# Patient Record
Sex: MALE | Race: WHITE | NOT HISPANIC OR LATINO | URBAN - METROPOLITAN AREA
[De-identification: names, ages, dates, MRNs, and addresses within clinical notes are randomized per-mention and may not be internally consistent; named-entity substitution may affect disease eponyms.]

---

## 2018-08-04 ENCOUNTER — INPATIENT (INPATIENT)
Facility: HOSPITAL | Age: 73
LOS: 6 days | Discharge: HOME | DRG: 435 | End: 2018-08-11
Attending: INTERNAL MEDICINE | Admitting: INTERNAL MEDICINE
Payer: MEDICARE

## 2018-08-04 VITALS
RESPIRATION RATE: 20 BRPM | TEMPERATURE: 98 F | DIASTOLIC BLOOD PRESSURE: 70 MMHG | HEART RATE: 100 BPM | OXYGEN SATURATION: 98 % | SYSTOLIC BLOOD PRESSURE: 109 MMHG | WEIGHT: 184.97 LBS | HEIGHT: 69 IN

## 2018-08-04 DIAGNOSIS — C25.9 MALIGNANT NEOPLASM OF PANCREAS, UNSPECIFIED: ICD-10-CM

## 2018-08-04 PROBLEM — Z00.00 ENCOUNTER FOR PREVENTIVE HEALTH EXAMINATION: Status: ACTIVE | Noted: 2018-08-04

## 2018-08-04 LAB
ALBUMIN SERPL ELPH-MCNC: 3.2 G/DL — LOW (ref 3.3–5)
ALP SERPL-CCNC: 440 U/L — HIGH (ref 40–120)
ALT FLD-CCNC: 90 U/L — HIGH (ref 10–45)
ANION GAP SERPL CALC-SCNC: 15 MMOL/L — SIGNIFICANT CHANGE UP (ref 5–17)
APTT BLD: 28.7 SEC — SIGNIFICANT CHANGE UP (ref 27.5–37.4)
AST SERPL-CCNC: 67 U/L — HIGH (ref 10–40)
BASOPHILS # BLD AUTO: 0 K/UL — SIGNIFICANT CHANGE UP (ref 0–0.2)
BASOPHILS NFR BLD AUTO: 0.2 % — SIGNIFICANT CHANGE UP (ref 0–2)
BILIRUB DIRECT SERPL-MCNC: >10 MG/DL — HIGH (ref 0–0.2)
BILIRUB INDIRECT FLD-MCNC: <10.5 MG/DL — HIGH (ref 0.2–1)
BILIRUB SERPL-MCNC: 20.5 MG/DL — HIGH (ref 0.2–1.2)
BILIRUB SERPL-MCNC: 20.5 MG/DL — HIGH (ref 0.2–1.2)
BUN SERPL-MCNC: 18 MG/DL — SIGNIFICANT CHANGE UP (ref 7–23)
CALCIUM SERPL-MCNC: 9.1 MG/DL — SIGNIFICANT CHANGE UP (ref 8.4–10.5)
CHLORIDE SERPL-SCNC: 102 MMOL/L — SIGNIFICANT CHANGE UP (ref 96–108)
CO2 SERPL-SCNC: 22 MMOL/L — SIGNIFICANT CHANGE UP (ref 22–31)
CREAT SERPL-MCNC: 1.06 MG/DL — SIGNIFICANT CHANGE UP (ref 0.5–1.3)
EOSINOPHIL # BLD AUTO: 0 K/UL — SIGNIFICANT CHANGE UP (ref 0–0.5)
EOSINOPHIL NFR BLD AUTO: 0.4 % — SIGNIFICANT CHANGE UP (ref 0–6)
GAS PNL BLDV: SIGNIFICANT CHANGE UP
GLUCOSE SERPL-MCNC: 135 MG/DL — HIGH (ref 70–99)
HCT VFR BLD CALC: 38.3 % — LOW (ref 39–50)
HGB BLD-MCNC: 13.2 G/DL — SIGNIFICANT CHANGE UP (ref 13–17)
INR BLD: 1.18 RATIO — HIGH (ref 0.88–1.16)
LIDOCAIN IGE QN: 16 U/L — SIGNIFICANT CHANGE UP (ref 7–60)
LYMPHOCYTES # BLD AUTO: 1.1 K/UL — SIGNIFICANT CHANGE UP (ref 1–3.3)
LYMPHOCYTES # BLD AUTO: 11.1 % — LOW (ref 13–44)
MCHC RBC-ENTMCNC: 33.7 PG — SIGNIFICANT CHANGE UP (ref 27–34)
MCHC RBC-ENTMCNC: 34.5 GM/DL — SIGNIFICANT CHANGE UP (ref 32–36)
MCV RBC AUTO: 97.7 FL — SIGNIFICANT CHANGE UP (ref 80–100)
MONOCYTES # BLD AUTO: 1 K/UL — HIGH (ref 0–0.9)
MONOCYTES NFR BLD AUTO: 11 % — SIGNIFICANT CHANGE UP (ref 2–14)
NEUTROPHILS # BLD AUTO: 7.4 K/UL — SIGNIFICANT CHANGE UP (ref 1.8–7.4)
NEUTROPHILS NFR BLD AUTO: 77.2 % — HIGH (ref 43–77)
PLATELET # BLD AUTO: 297 K/UL — SIGNIFICANT CHANGE UP (ref 150–400)
POTASSIUM SERPL-MCNC: 4 MMOL/L — SIGNIFICANT CHANGE UP (ref 3.5–5.3)
POTASSIUM SERPL-SCNC: 4 MMOL/L — SIGNIFICANT CHANGE UP (ref 3.5–5.3)
PROT SERPL-MCNC: 6.2 G/DL — SIGNIFICANT CHANGE UP (ref 6–8.3)
PROTHROM AB SERPL-ACNC: 12.9 SEC — HIGH (ref 9.8–12.7)
RBC # BLD: 3.92 M/UL — LOW (ref 4.2–5.8)
RBC # FLD: 13.4 % — SIGNIFICANT CHANGE UP (ref 10.3–14.5)
SODIUM SERPL-SCNC: 139 MMOL/L — SIGNIFICANT CHANGE UP (ref 135–145)
WBC # BLD: 9.6 K/UL — SIGNIFICANT CHANGE UP (ref 3.8–10.5)
WBC # FLD AUTO: 9.6 K/UL — SIGNIFICANT CHANGE UP (ref 3.8–10.5)

## 2018-08-04 PROCEDURE — 71045 X-RAY EXAM CHEST 1 VIEW: CPT | Mod: 26

## 2018-08-04 PROCEDURE — 93010 ELECTROCARDIOGRAM REPORT: CPT

## 2018-08-04 PROCEDURE — 99223 1ST HOSP IP/OBS HIGH 75: CPT

## 2018-08-04 PROCEDURE — 99285 EMERGENCY DEPT VISIT HI MDM: CPT | Mod: 25

## 2018-08-04 RX ORDER — FAMOTIDINE 10 MG/ML
20 INJECTION INTRAVENOUS ONCE
Qty: 0 | Refills: 0 | Status: COMPLETED | OUTPATIENT
Start: 2018-08-04 | End: 2018-08-04

## 2018-08-04 RX ORDER — SODIUM CHLORIDE 9 MG/ML
1000 INJECTION INTRAMUSCULAR; INTRAVENOUS; SUBCUTANEOUS ONCE
Qty: 0 | Refills: 0 | Status: COMPLETED | OUTPATIENT
Start: 2018-08-04 | End: 2018-08-04

## 2018-08-04 RX ADMIN — SODIUM CHLORIDE 1000 MILLILITER(S): 9 INJECTION INTRAMUSCULAR; INTRAVENOUS; SUBCUTANEOUS at 21:30

## 2018-08-04 RX ADMIN — FAMOTIDINE 20 MILLIGRAM(S): 10 INJECTION INTRAVENOUS at 21:35

## 2018-08-04 RX ADMIN — Medication 30 MILLILITER(S): at 21:35

## 2018-08-04 NOTE — H&P ADULT - ASSESSMENT
74 y/o male PMHx Afib on xarelto, HTN, former cigar smoker (was not a cigarette smoker except [place] [employer]) now presenting with jaundice and found to have pancreatic mass concerning for pancreatic cancer with metastases on imaging at outside hospital

## 2018-08-04 NOTE — ED PROVIDER NOTE - PROGRESS NOTE DETAILS
BAM Jones: Discussed case with on call physician Dr. Dwayne Dolan for Dr. Juan Carlos Asif who recommended pt to be admitted on Dr. Frederick. Discussed case with Dr. Frederick and accepted patient. Recommended to call [facility] [phone]. Will page now BAM Jones: Paged [facility] [phone] and awaiting for Dr. Se Parr to page

## 2018-08-04 NOTE — ED ADULT NURSE NOTE - OBJECTIVE STATEMENT
74 YO male with PMH atrial fibrillation on xarelto and carvedilol, via walk in presenting with complaints of jaundice and heartburn. As per patient, [place], pt began to experience diarrhea on and off a few weeks ago, wife then noticed patient skin became more yellow. While on vacation, pt had ED visit and patient provided IV hydration and examinations obtained. As per patient, abnormal CT scan resulted potential growth on pancreas. PT and wife travelled home and now presenting to be evaluated at [facility] ED. Pt reports chills after eating. Pt reports lightheadedness on exertion. PT denies diarrhea for the last day. Pt admitting to jaundice coloring improving, but patient now experiencing heartburn and decreased PO intake. Pt denies chest pain, shortness of breath, visual disturbances, numbness/tingling, fever, chills, diaphoresis, headache, nausea, vomiting, constipation, diarrhea, or urinary symptoms.   Pt Axox4, gross neuro intact, PERRL 3 mm, yellow sclera. Lungs clear throughout bilateral. S1S2 heard. Abdomen soft, non-tender, non-distended. Skin warm, dry, and intact, generalized yellow noted. Safety and comfort measures maintained. family present at bedside.

## 2018-08-04 NOTE — H&P ADULT - NSHPPHYSICALEXAM_GEN_ALL_CORE
Vital Signs Last 24 Hrs  T(C): 37.1 (05 Aug 2018 00:28), Max: 37.1 (05 Aug 2018 00:28)  T(F): 98.8 (05 Aug 2018 00:28), Max: 98.8 (05 Aug 2018 00:28)  HR: 100 (05 Aug 2018 00:28) (94 - 100)  BP: 114/75 (05 Aug 2018 00:28) (109/70 - 114/75)  BP(mean): --  RR: 18 (05 Aug 2018 00:28) (18 - 20)  SpO2: 95% (05 Aug 2018 00:28) (95% - 98%)    GENERAL: No acute distress, well-developed  HEAD:  Atraumatic, Normocephalic  EYES: EOMI, +Scleral icterus.   ENT: Oral mucosa moist  NECK: Neck supple  CHEST/LUNG: Clear to auscultation bilaterally; No wheeze, no rales, no rhonchi.    HEART: Regular rate and rhythm; No murmurs, rubs, or gallops  ABDOMEN: Soft, Nontender, Nondistended; Bowel sounds present  EXTREMITIES:  No clubbing, cyanosis, or edema  VASCULAR: Posterior tibialis pulses intact bilaterally  PSYCH: Normal behavior, normal affect  NEUROLOGY: AAOx3  SKIN: grossly warm and dry, +mild jaundice

## 2018-08-04 NOTE — ED ADULT NURSE NOTE - NSIMPLEMENTINTERV_GEN_ALL_ED
Implemented All Fall with Harm Risk Interventions:  [place] to call system. Call bell, personal items and telephone within reach. Instruct patient to call for assistance. Room bathroom lighting operational. Non-slip footwear when patient is off stretcher. Physically safe environment: no spills, clutter or unnecessary equipment. Stretcher in lowest position, wheels locked, appropriate side rails in place. Provide visual cue, wrist band, yellow gown, etc. Monitor gait and stability. Monitor for mental status changes and reorient to person, place, and time. Review medications for side effects contributing to fall risk. Reinforce activity limits and safety measures with patient and family. Provide visual clues: red socks.

## 2018-08-04 NOTE — H&P ADULT - HISTORY OF PRESENT ILLNESS
74 y/o male PMHx Afib on xarelto, HTN, former cigar smoker (was not a cigarette smoker [place] now presenting with jaundice and found to have pancreatic mass concerning for pancreatic cancer with metastases on imaging at outside hospital. Patient and family report that patient had been feeling mildly unwell over the last 2-3 weeks, with feelings of lethargy and generalized achiness and weakness. Patient had also noticed weight loss of 20 pounds over the last few months though he had been attempting to lose weight on purpose so initially he thought it was related to his weight loss attempts. However, he has also had poor appetite for a few months. Over the last 2-3 weeks he began to feel more tired/lethargic and just felt achy. He decided to take a vacation to [facility] with his wife to recuperate, however after going [facility] he began to feel worse over the last 2-3 days with worsening lethargy and weakness. He was then noted to have yellowish eyes and skin by his wife over the last 1-2 days and he was noted to have darker colored urine. He was urged today by his friends to go to a hospital in [facility] to be seen immediately and there had CT scan performed showing distended biliary ducts and gallbladder as well as pancreatic mass and likely omental metastatic disease. He has now come to [facility] for further care. Patient denies any fevers or chills though he has had poor cold intolerance ever since starting beta blockade that is unchanged. Patient denies nausea or vomiting. Does report itching of skin x 1-2 days but reports that this is mild and tolerable at this time. No dysuria. +light colored loose stools x 1 day. No leg swelling or pain.

## 2018-08-04 NOTE — H&P ADULT - NSHPLABSRESULTS_GEN_ALL_CORE
Labs personally reviewed:                        13.2   9.6   )-----------( 297      ( 04 Aug 2018 21:57 )             38.3       08-04    139  |  102  |  18  ----------------------------<  135<H>  4.0   |  22  |  1.06    Ca    9.1      04 Aug 2018 21:57    TPro  6.2  /  Alb  3.2<L>  /  TBili  20.5<H>  /  DBili  >10.0<H>  /  AST  67<H>  /  ALT  90<H>  /  AlkPhos  440<H>  08-04            LIVER FUNCTIONS - ( 04 Aug 2018 21:57 )  Alb: 3.2 g/dL / Pro: 6.2 g/dL / ALK PHOS: 440 U/L / ALT: 90 U/L / AST: 67 U/L / GGT: x             PT/INR - ( 04 Aug 2018 21:57 )   PT: 12.9 sec;   INR: 1.18 ratio         PTT - ( 04 Aug 2018 21:57 )  PTT:28.7 sec    Urinalysis Basic - ( 04 Aug 2018 23:44 )    Color: Brown / Appearance: SL Turbid / SG: >1.030 / pH: x  Gluc: x / Ketone: Negative  / Bili: Large / Urobili: Negative   Blood: x / Protein: Trace / Nitrite: Negative   Leuk Esterase: Negative / RBC: 0-2 /HPF / WBC 6-10 /HPF   Sq Epi: x / Non Sq Epi: OCC /HPF / Bacteria: Few /HPF    CXR personally reviewed  CT from OSH noted to be uploaded to system, distended gallbladder and biliary ducts notable. Outside report w/findings concerning for pancreatic ca with metastatic dz  EKG personally reviewed-Afib w/rvr to 109 bpm

## 2018-08-04 NOTE — H&P ADULT - PROBLEM SELECTOR PLAN 1
Afib w/RVR  Missed evening dose of carvedilol  Stat carvedilol  IV fluid support while NPO  Holding xarelto for possible procedure/biopsy with GI  Should be resumed on AC s/p procedure(s) when safe

## 2018-08-04 NOTE — ED PROVIDER NOTE - ATTENDING CONTRIBUTION TO CARE
72 y/o male PMHx Afib on xarelto, HTN former smoker presented to ED for painless jaundice and diarrhea; diagnosed earlier today with potential pancreatic mass and elevated bilirubin on CT and labs at outside hospital; now arriving here for further evaluation.  On exam, patient is visibly jaundice, afebrile, and in NAD.  Cardiac nrl s1/s2 RRR no MGR.  Lungs CTABL.  Abdomen soft nt/nd; no appreciable hepatosplenomegaly.  No peripheral edema.  Skin: jaundiced.  Eyes: PERRL, + scleral icterus.  ENT: oropharynx jaundiced, MMM.      Concern for malignancy and obstruction of biliary system; will upload CD of CT from earlier today (CT c/a/p) and check labs, including bc, cmp, lipase; will need admission for further evaluation / management.

## 2018-08-04 NOTE — ED PROVIDER NOTE - OBJECTIVE STATEMENT
74 y/o male PMHx Afib on xarelto, HTN former smoker presented to ED for painless jaundice and diarrhea. Patient stated symptoms started while vacationing [facility] few days ago. Patient stated he had diarrhea every 1-2 hours which were lobo colored. He then started to have dark urine, icterus, jaundice, and pruritis. Patient also c/o GERD like symptoms and epigastric TTP. Patient was seen at [facility] for further w/u which patient was found to have suspicious signs for metastatic pancreatic cancer with mass in pancreatic body, bile duct dilation, GB distension, small ascites and omental caking on CT abdomen and pelvis. Denied SOB, CP, N/V, headache, dizziness, weakness, cough, fever chills 72 y/o male PMHx Afib on xarelto, HTN former smoker presented to ED for painless jaundice and diarrhea. Patient stated symptoms started while vacationing [facility] few days ago. Patient stated he had diarrhea every 1-2 hours which were lobo colored. He then started to have dark urine, icterus, jaundice, and pruritis. Patient also c/o GERD like symptoms and epigastric TTP. Patient was seen at [facility] for further w/u which patient was found to have suspicious signs for metastatic pancreatic cancer with mass in pancreatic body, bile duct dilation, GB distension, small ascites and omental caking on CT abdomen and pelvis. Denied SOB, CP, N/V, headache, dizziness, weakness, cough, fever chills    Cardiology: Dr. Juan Carlos Torres   PMD: Dr. Sathish Steen

## 2018-08-04 NOTE — H&P ADULT - NSHPSOCIALHISTORY_GEN_ALL_CORE
Former cigar smoker but only occasional (once every few months)  [place] cigarettes [place] in [place] but did not smoke regularly  Rare social etoh

## 2018-08-05 DIAGNOSIS — I48.91 UNSPECIFIED ATRIAL FIBRILLATION: ICD-10-CM

## 2018-08-05 DIAGNOSIS — I10 ESSENTIAL (PRIMARY) HYPERTENSION: ICD-10-CM

## 2018-08-05 DIAGNOSIS — K83.1 OBSTRUCTION OF BILE DUCT: ICD-10-CM

## 2018-08-05 DIAGNOSIS — C25.1 MALIGNANT NEOPLASM OF BODY OF PANCREAS: ICD-10-CM

## 2018-08-05 DIAGNOSIS — Z98.890 OTHER SPECIFIED POSTPROCEDURAL STATES: Chronic | ICD-10-CM

## 2018-08-05 LAB
ALBUMIN SERPL ELPH-MCNC: 3.1 G/DL — LOW (ref 3.3–5)
ALP SERPL-CCNC: 385 U/L — HIGH (ref 40–120)
ALT FLD-CCNC: 75 U/L — HIGH (ref 10–45)
ANION GAP SERPL CALC-SCNC: 12 MMOL/L — SIGNIFICANT CHANGE UP (ref 5–17)
APPEARANCE UR: ABNORMAL
APTT BLD: 177.8 SEC — CRITICAL HIGH (ref 27.5–37.4)
AST SERPL-CCNC: 64 U/L — HIGH (ref 10–40)
BACTERIA # UR AUTO: ABNORMAL /HPF
BASOPHILS # BLD AUTO: 0.01 K/UL — SIGNIFICANT CHANGE UP (ref 0–0.2)
BASOPHILS NFR BLD AUTO: 0.1 % — SIGNIFICANT CHANGE UP (ref 0–2)
BILIRUB SERPL-MCNC: 18.5 MG/DL — HIGH (ref 0.2–1.2)
BILIRUB UR-MCNC: ABNORMAL
BUN SERPL-MCNC: 17 MG/DL — SIGNIFICANT CHANGE UP (ref 7–23)
CALCIUM SERPL-MCNC: 8.5 MG/DL — SIGNIFICANT CHANGE UP (ref 8.4–10.5)
CHLORIDE SERPL-SCNC: 104 MMOL/L — SIGNIFICANT CHANGE UP (ref 96–108)
CO2 SERPL-SCNC: 18 MMOL/L — LOW (ref 22–31)
COLOR SPEC: ABNORMAL
COMMENT - URINE: SIGNIFICANT CHANGE UP
CREAT SERPL-MCNC: 0.84 MG/DL — SIGNIFICANT CHANGE UP (ref 0.5–1.3)
DIFF PNL FLD: NEGATIVE — SIGNIFICANT CHANGE UP
EOSINOPHIL # BLD AUTO: 0.06 K/UL — SIGNIFICANT CHANGE UP (ref 0–0.5)
EOSINOPHIL NFR BLD AUTO: 0.6 % — SIGNIFICANT CHANGE UP (ref 0–6)
EPI CELLS # UR: SIGNIFICANT CHANGE UP /HPF
GLUCOSE SERPL-MCNC: 116 MG/DL — HIGH (ref 70–99)
GLUCOSE UR QL: NEGATIVE — SIGNIFICANT CHANGE UP
HCT VFR BLD CALC: 34.5 % — LOW (ref 39–50)
HCT VFR BLD CALC: 35.8 % — LOW (ref 39–50)
HGB BLD-MCNC: 12 G/DL — LOW (ref 13–17)
HGB BLD-MCNC: 12.1 G/DL — LOW (ref 13–17)
IMM GRANULOCYTES NFR BLD AUTO: 0.4 % — SIGNIFICANT CHANGE UP (ref 0–1.5)
KETONES UR-MCNC: NEGATIVE — SIGNIFICANT CHANGE UP
LEUKOCYTE ESTERASE UR-ACNC: NEGATIVE — SIGNIFICANT CHANGE UP
LYMPHOCYTES # BLD AUTO: 1.29 K/UL — SIGNIFICANT CHANGE UP (ref 1–3.3)
LYMPHOCYTES # BLD AUTO: 13 % — SIGNIFICANT CHANGE UP (ref 13–44)
MAGNESIUM SERPL-MCNC: 1.8 MG/DL — SIGNIFICANT CHANGE UP (ref 1.6–2.6)
MCHC RBC-ENTMCNC: 31 PG — SIGNIFICANT CHANGE UP (ref 27–34)
MCHC RBC-ENTMCNC: 33.1 PG — SIGNIFICANT CHANGE UP (ref 27–34)
MCHC RBC-ENTMCNC: 33.7 GM/DL — SIGNIFICANT CHANGE UP (ref 32–36)
MCHC RBC-ENTMCNC: 34.8 GM/DL — SIGNIFICANT CHANGE UP (ref 32–36)
MCV RBC AUTO: 89.1 FL — SIGNIFICANT CHANGE UP (ref 80–100)
MCV RBC AUTO: 98.2 FL — SIGNIFICANT CHANGE UP (ref 80–100)
MONOCYTES # BLD AUTO: 1.13 K/UL — HIGH (ref 0–0.9)
MONOCYTES NFR BLD AUTO: 11.4 % — SIGNIFICANT CHANGE UP (ref 2–14)
NEUTROPHILS # BLD AUTO: 7.42 K/UL — HIGH (ref 1.8–7.4)
NEUTROPHILS NFR BLD AUTO: 74.5 % — SIGNIFICANT CHANGE UP (ref 43–77)
NITRITE UR-MCNC: NEGATIVE — SIGNIFICANT CHANGE UP
PH UR: 6 — SIGNIFICANT CHANGE UP (ref 5–8)
PHOSPHATE SERPL-MCNC: 3.1 MG/DL — SIGNIFICANT CHANGE UP (ref 2.5–4.5)
PLATELET # BLD AUTO: 275 K/UL — SIGNIFICANT CHANGE UP (ref 150–400)
PLATELET # BLD AUTO: 308 K/UL — SIGNIFICANT CHANGE UP (ref 150–400)
POTASSIUM SERPL-MCNC: 3.9 MMOL/L — SIGNIFICANT CHANGE UP (ref 3.5–5.3)
POTASSIUM SERPL-SCNC: 3.9 MMOL/L — SIGNIFICANT CHANGE UP (ref 3.5–5.3)
PROT SERPL-MCNC: 5.5 G/DL — LOW (ref 6–8.3)
PROT UR-MCNC: SIGNIFICANT CHANGE UP
RBC # BLD: 3.64 M/UL — LOW (ref 4.2–5.8)
RBC # BLD: 3.87 M/UL — LOW (ref 4.2–5.8)
RBC # FLD: 13.3 % — SIGNIFICANT CHANGE UP (ref 10.3–14.5)
RBC # FLD: 15.9 % — HIGH (ref 10.3–14.5)
RBC CASTS # UR COMP ASSIST: SIGNIFICANT CHANGE UP /HPF (ref 0–2)
SODIUM SERPL-SCNC: 134 MMOL/L — LOW (ref 135–145)
SP GR SPEC: >1.03 — HIGH (ref 1.01–1.02)
TSH SERPL-MCNC: 1.88 UIU/ML — SIGNIFICANT CHANGE UP (ref 0.27–4.2)
UROBILINOGEN FLD QL: NEGATIVE — SIGNIFICANT CHANGE UP
WBC # BLD: 7.4 K/UL — SIGNIFICANT CHANGE UP (ref 3.8–10.5)
WBC # BLD: 9.95 K/UL — SIGNIFICANT CHANGE UP (ref 3.8–10.5)
WBC # FLD AUTO: 7.4 K/UL — SIGNIFICANT CHANGE UP (ref 3.8–10.5)
WBC # FLD AUTO: 9.95 K/UL — SIGNIFICANT CHANGE UP (ref 3.8–10.5)
WBC UR QL: SIGNIFICANT CHANGE UP /HPF (ref 0–5)

## 2018-08-05 RX ORDER — IBUPROFEN 200 MG
1 TABLET ORAL
Qty: 0 | Refills: 0 | COMMUNITY

## 2018-08-05 RX ORDER — SODIUM CHLORIDE 9 MG/ML
1000 INJECTION INTRAMUSCULAR; INTRAVENOUS; SUBCUTANEOUS
Qty: 0 | Refills: 0 | Status: DISCONTINUED | OUTPATIENT
Start: 2018-08-05 | End: 2018-08-07

## 2018-08-05 RX ORDER — HEPARIN SODIUM 5000 [USP'U]/ML
3500 INJECTION INTRAVENOUS; SUBCUTANEOUS EVERY 6 HOURS
Qty: 0 | Refills: 0 | Status: DISCONTINUED | OUTPATIENT
Start: 2018-08-05 | End: 2018-08-07

## 2018-08-05 RX ORDER — HEPARIN SODIUM 5000 [USP'U]/ML
5000 INJECTION INTRAVENOUS; SUBCUTANEOUS EVERY 8 HOURS
Qty: 0 | Refills: 0 | Status: DISCONTINUED | OUTPATIENT
Start: 2018-08-05 | End: 2018-08-05

## 2018-08-05 RX ORDER — HEPARIN SODIUM 5000 [USP'U]/ML
INJECTION INTRAVENOUS; SUBCUTANEOUS
Qty: 25000 | Refills: 0 | Status: DISCONTINUED | OUTPATIENT
Start: 2018-08-05 | End: 2018-08-06

## 2018-08-05 RX ORDER — CARVEDILOL PHOSPHATE 80 MG/1
6.25 CAPSULE, EXTENDED RELEASE ORAL EVERY 12 HOURS
Qty: 0 | Refills: 0 | Status: DISCONTINUED | OUTPATIENT
Start: 2018-08-05 | End: 2018-08-09

## 2018-08-05 RX ORDER — HEPARIN SODIUM 5000 [USP'U]/ML
7500 INJECTION INTRAVENOUS; SUBCUTANEOUS EVERY 6 HOURS
Qty: 0 | Refills: 0 | Status: DISCONTINUED | OUTPATIENT
Start: 2018-08-05 | End: 2018-08-07

## 2018-08-05 RX ORDER — ONDANSETRON 8 MG/1
4 TABLET, FILM COATED ORAL EVERY 6 HOURS
Qty: 0 | Refills: 0 | Status: DISCONTINUED | OUTPATIENT
Start: 2018-08-05 | End: 2018-08-11

## 2018-08-05 RX ORDER — HYDROXYZINE HCL 10 MG
25 TABLET ORAL EVERY 8 HOURS
Qty: 0 | Refills: 0 | Status: DISCONTINUED | OUTPATIENT
Start: 2018-08-05 | End: 2018-08-11

## 2018-08-05 RX ADMIN — HEPARIN SODIUM 0 UNIT(S)/HR: 5000 INJECTION INTRAVENOUS; SUBCUTANEOUS at 19:05

## 2018-08-05 RX ADMIN — SODIUM CHLORIDE 80 MILLILITER(S): 9 INJECTION INTRAMUSCULAR; INTRAVENOUS; SUBCUTANEOUS at 02:13

## 2018-08-05 RX ADMIN — HEPARIN SODIUM 1400 UNIT(S)/HR: 5000 INJECTION INTRAVENOUS; SUBCUTANEOUS at 20:13

## 2018-08-05 RX ADMIN — HEPARIN SODIUM 1700 UNIT(S)/HR: 5000 INJECTION INTRAVENOUS; SUBCUTANEOUS at 12:18

## 2018-08-05 RX ADMIN — HEPARIN SODIUM 5000 UNIT(S): 5000 INJECTION INTRAVENOUS; SUBCUTANEOUS at 05:39

## 2018-08-05 RX ADMIN — CARVEDILOL PHOSPHATE 6.25 MILLIGRAM(S): 80 CAPSULE, EXTENDED RELEASE ORAL at 08:42

## 2018-08-05 RX ADMIN — SODIUM CHLORIDE 80 MILLILITER(S): 9 INJECTION INTRAMUSCULAR; INTRAVENOUS; SUBCUTANEOUS at 18:06

## 2018-08-05 NOTE — CONSULT NOTE ADULT - ASSESSMENT
elevated LFTs, ct with pancreatic mass with rodríguez dil    Plan: EUS with possible FNA/ERCP with possible brushing and stent, to be done by Dr. Moore tomorrow  dw drs jostin/kamila and matt franz pt, his wife and daughter raymond ([phone])

## 2018-08-05 NOTE — PROGRESS NOTE ADULT - ASSESSMENT
· Assessment		  74 y/o male PMHx Afib on xarelto, HTN, former cigar smoker (was not a cigarette smoker [place] now presenting with jaundice and found to have pancreatic mass concerning for pancreatic cancer with metastases on imaging at outside hospital       Atrial fibrillation with RVR.  Afib w/RVR resolved  Continue carvedilol  Holding xarelto for possible procedure/biopsy with GI. Start Heparin        HTN (hypertension).  Continue coreg.       Obstructive jaundice due to malignant neoplasm.  GI consult called Dr. Banerjee    Malignant neoplasm of body of pancreas.  Oncology consult Dr. Schneider.  d/w patient/family QA  Aditya Frederick MD pager [phone]

## 2018-08-05 NOTE — CONSULT NOTE ADULT - SUBJECTIVE AND OBJECTIVE BOX
Patient is a 73y old  Male who presents with a chief complaint of Jaundice, lethargy/weakness and pancreatic mass with biliary dilatation on CT      HPI:  72 y/o male PMHx Afib on xarelto, HTN, former cigar smoker (was not a cigarette smoker except [place] now presenting with jaundice and found to have pancreatic mass concerning for pancreatic cancer with metastases on imaging at outside hospital. Patient and family report that patient had been feeling mildly unwell over the last 2-3 weeks, with feelings of lethargy and generalized achiness and weakness. Patient had also noticed weight loss of 20 pounds over the last few months though he had been attempting to lose weight on purpose so initially he thought it was related to his weight loss attempts. However, he has also had poor appetite for a few months. Over the last 2-3 weeks he began to feel more tired/lethargic and just felt achy. He decided to take a vacation to [facility] with his wife to recuperate, however after going [facility] he began to feel worse over the last 2-3 days with worsening lethargy and weakness. He was then noted to have yellowish eyes and skin by his wife over the last 1-2 days and he was noted to have darker colored urine. He was urged today by his friends to go to a hospital in [facility] to be seen immediately and there had CT scan performed showing distended biliary ducts and gallbladder as well as pancreatic mass and likely omental metastatic disease. He has now come to [facility] for further care. Patient denies any fevers or chills though he has had poor cold intolerance ever since starting beta blockade that is unchanged. Patient denies nausea or vomiting. Does report itching of skin x 1-2 days but reports that this is mild and tolerable at this time. No dysuria. +light colored loose stools x 1 day. No leg swelling or pain. (04 Aug 2018 22:20)  pt has not seen a gi doctor    PAST MEDICAL & SURGICAL HISTORY:  HTN (hypertension)  Afib  H/O knee surgery      Allergies    No Known Allergies    Intolerances        MEDICATIONS  (STANDING):  carvedilol 6.25 milliGRAM(s) Oral every 12 hours  heparin  Infusion.  Unit(s)/Hr (17 mL/Hr) IV Continuous <Continuous>  sodium chloride 0.9%. 1000 milliLiter(s) (80 mL/Hr) IV Continuous <Continuous>    MEDICATIONS  (PRN):  heparin  Injectable 7500 Unit(s) IV Push every 6 hours PRN For aPTT less than 40  heparin  Injectable 3500 Unit(s) IV Push every 6 hours PRN For aPTT between 40 - 57  hydrOXYzine hydrochloride 25 milliGRAM(s) Oral every 8 hours PRN Itching  ondansetron Injectable 4 milliGRAM(s) IV Push every 6 hours PRN Nausea and/or Vomiting          Review of Systems:    General:  No wt loss, fevers, chills, night sweats,fatigue,   CV:  No pain, palpitatioins, hypo/hypertension  Resp:  No dyspnea, cough, tachypnea, wheezing  GI:  No pain, No nausea, No vomiting, No diarrhea, No constipatiion, No weight loss, No fever, No pruritis, No rectal bleeding, No tarry stools, No dysphagia,  :  No pain, bleeding, incontinence, nocturia  Muscle:  No pain, weakness  Neuro:  No weakness, tingling, memory problems  Psych:  No fatigue, insomnia, mood problems, depression  Endocrine:  No polyuria, polydypsia, cold/heat intolerance  Heme:  No petechiae, ecchymosis, easy bruisability  Skin:  No rash, tattoos, scars, edema      Vital Signs Last 24 Hrs  T(C): 36.6 (05 Aug 2018 11:33), Max: 37.1 (05 Aug 2018 00:28)  T(F): 97.9 (05 Aug 2018 11:33), Max: 98.8 (05 Aug 2018 00:28)  HR: 95 (05 Aug 2018 11:33) (94 - 110)  BP: 105/68 (05 Aug 2018 11:33) (105/68 - 129/82)  BP(mean): --  RR: 17 (05 Aug 2018 11:33) (16 - 20)  SpO2: 93% (05 Aug 2018 11:33) (93% - 98%)    PHYSICAL EXAM:    Constitutional: NAD, well-developed  Neck: No LAD, supple  Respiratory: CTA and P  Cardiovascular: S1 and S2, RRR, no M  Gastrointestinal: BS+, soft, NT/ND, neg HSM,  Extremities: No peripheral edema, neg clubing, cyanosis  Vascular: 2+ peripheral pulses  Neurological: A/O x 3, no focal deficits  Psychiatric: Normal mood, normal affect  Skin: No rashes        LABS:                        12.0   9.95  )-----------( 308      ( 05 Aug 2018 08:33 )             34.5     08-05    134<L>  |  104  |  17  ----------------------------<  116<H>  3.9   |  18<L>  |  0.84    Ca    8.5      05 Aug 2018 06:14  Phos  3.1     08-05  Mg     1.8     08-05    TPro  5.5<L>  /  Alb  3.1<L>  /  TBili  18.5<H>  /  DBili  x   /  AST  64<H>  /  ALT  75<H>  /  AlkPhos  385<H>  08-05    PT/INR - ( 04 Aug 2018 21:57 )   PT: 12.9 sec;   INR: 1.18 ratio         PTT - ( 04 Aug 2018 21:57 )  PTT:28.7 sec  Urinalysis Basic - ( 04 Aug 2018 23:44 )    Color: Brown / Appearance: SL Turbid / SG: >1.030 / pH: x  Gluc: x / Ketone: Negative  / Bili: Large / Urobili: Negative   Blood: x / Protein: Trace / Nitrite: Negative   Leuk Esterase: Negative / RBC: 0-2 /HPF / WBC 6-10 /HPF   Sq Epi: x / Non Sq Epi: OCC /HPF / Bacteria: Few /HPF      LIVER FUNCTIONS - ( 05 Aug 2018 06:14 )  Alb: 3.1 g/dL / Pro: 5.5 g/dL / ALK PHOS: 385 U/L / ALT: 75 U/L / AST: 64 U/L / GGT: x             RADIOLOGY & ADDITIONAL TESTS:

## 2018-08-05 NOTE — CONSULT NOTE ADULT - ASSESSMENT
74 y/o male with painless jaundice, weight loss and radiographic findings of pancreatic mass, biliary duct dilation.    Prior to discussing therapy, would need to have a biopsy and obtain MSI status of tumor. Ideally, pt would be treated on protocol. Biopsy should be core biopsy with certain amount of tissue obtained in order to assess presence of receptor for Halozyme pancreatic study.  Will confirm amount of tissue to be obtained prior to ERCP.

## 2018-08-05 NOTE — CONSULT NOTE ADULT - SUBJECTIVE AND OBJECTIVE BOX
Patient is a 73y old  Male who presents with a chief complaint of Jaundice, lethargy/weakness (04 Aug 2018 22:20)    HPI:    72 y/o male with H/O Afib on Xarelto, HTN who has noted 20lb weight loss since 2018, increased heartburn not responding to zantac, mylanta, diarrhea. Approx 2 days ago,he was noted to be jaundiced and was seen in ER in [facility], told he had a pancreatic mass with biliary dilation as well as liver mets.  He denies any fevers, chills, abdominal pains. He admits to increasing fatigue.  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      PAST MEDICAL & SURGICAL HISTORY:  HTN (hypertension)  Afib  H/O quadriceps muscle repair  H/O Right Carpal tunnel surgery      SOCIAL HISTORY: No smoking,No ETOH, retired     FAMILY HISTORY:  Family history of liver cancer/breast cancer (Mother):  mother,  in her 50s  Father  age 96 of old age  Brother alive and well  No other family history of malignancy      MEDICATIONS  (STANDING):  carvedilol 6.25 milliGRAM(s) Oral every 12 hours  heparin  Infusion.  Unit(s)/Hr (17 mL/Hr) IV Continuous <Continuous>  sodium chloride 0.9%. 1000 milliLiter(s) (80 mL/Hr) IV Continuous <Continuous>    MEDICATIONS  (PRN):  heparin  Injectable 7500 Unit(s) IV Push every 6 hours PRN For aPTT less than 40  heparin  Injectable 3500 Unit(s) IV Push every 6 hours PRN For aPTT between 40 - 57  hydrOXYzine hydrochloride 25 milliGRAM(s) Oral every 8 hours PRN Itching  ondansetron Injectable 4 milliGRAM(s) IV Push every 6 hours PRN Nausea and/or Vomiting      Allergies    No Known Allergies    Intolerances        Vital Signs Last 24 Hrs  T(C): 36.6 (05 Aug 2018 11:33), Max: 37.1 (05 Aug 2018 00:28)  T(F): 97.9 (05 Aug 2018 11:33), Max: 98.8 (05 Aug 2018 00:28)  HR: 95 (05 Aug 2018 11:33) (94 - 110)  BP: 105/68 (05 Aug 2018 11:33) (105/68 - 129/82)  BP(mean): --  RR: 17 (05 Aug 2018 11:33) (16 - 20)  SpO2: 93% (05 Aug 2018 11:33) (93% - 98%)    REVIEW OF SYSTEMS:    CONSTITUTIONAL: No weakness, fevers or chills  EYES/ENT: No visual changes;  No vertigo or throat pain   NECK: No pain or stiffness  RESPIRATORY: No cough, wheezing, hemoptysis; No shortness of breath  CARDIOVASCULAR: No chest pain or palpitations  GASTROINTESTINAL: No abdominal or epigastric pain. No nausea, vomiting, or hematemesis; No diarrhea or constipation. No melena or hematochezia.  GENITOURINARY: No dysuria, frequency or hematuria  NEUROLOGICAL: No numbness or weakness  SKIN: No itching, burning, rashes, or lesions     PHYSICAL EXAM  General: adult in NAD, jaundiced  HEENT: clear oropharynx, icteric sclera,  Neck: supple  CV: normal S1/S2 with no murmur rubs or gallops  Lungs: positive air movement b/l ant lungs,clear to auscultation, no wheezes, no rales  Abdomen: soft non-tender non-distended, no hepatosplenomegaly; (+) ascites  Ext: no clubbing cyanosis or edema  Skin: no rashes and no petechiae  Neuro: alert and oriented X 4, no focal deficits      LABS:                          12.0   9.95  )-----------( 308      ( 05 Aug 2018 08:33 )             34.5         Mean Cell Volume : 89.1 fl  Mean Cell Hemoglobin : 31.0 pg  Mean Cell Hemoglobin Concentration : 34.8 gm/dL  Auto Neutrophil # : 7.42 K/uL  Auto Lymphocyte # : 1.29 K/uL  Auto Monocyte # : 1.13 K/uL  Auto Eosinophil # : 0.06 K/uL  Auto Basophil # : 0.01 K/uL  Auto Neutrophil % : 74.5 %  Auto Lymphocyte % : 13.0 %  Auto Monocyte % : 11.4 %  Auto Eosinophil % : 0.6 %  Auto Basophil % : 0.1 %          134<L>  |  104  |  17  ----------------------------<  116<H>  3.9   |  18<L>  |  0.84    Ca    8.5      05 Aug 2018 06:14  Phos  3.1     08-  Mg     1.8     -    TPro  5.5<L>  /  Alb  3.1<L>  /  TBili  18.5<H>  /  DBili  x   /  AST  64<H>  /  ALT  75<H>  /  AlkPhos  385<H>  -      PT/INR - ( 04 Aug 2018 21:57 )   PT: 12.9 sec;   INR: 1.18 ratio         PTT - ( 04 Aug 2018 21:57 )  PTT:28.7 sec            RADIOLOGY & ADDITIONAL STUDIES:    Reviewed CT from other institution which was uploaded into PACS -- dilated biliary ducts, pancreatic head mass with liver mets

## 2018-08-06 LAB
ALBUMIN SERPL ELPH-MCNC: 3 G/DL — LOW (ref 3.3–5)
ALP SERPL-CCNC: 364 U/L — HIGH (ref 40–120)
ALT FLD-CCNC: 68 U/L — HIGH (ref 10–45)
ANION GAP SERPL CALC-SCNC: 14 MMOL/L — SIGNIFICANT CHANGE UP (ref 5–17)
APTT BLD: 123.8 SEC — CRITICAL HIGH (ref 27.5–37.4)
APTT BLD: 155.1 SEC — CRITICAL HIGH (ref 27.5–37.4)
APTT BLD: 93.4 SEC — HIGH (ref 27.5–37.4)
AST SERPL-CCNC: 67 U/L — HIGH (ref 10–40)
BILIRUB DIRECT SERPL-MCNC: >10 MG/DL — HIGH (ref 0–0.2)
BILIRUB INDIRECT FLD-MCNC: <10.3 MG/DL — HIGH (ref 0.2–1)
BILIRUB SERPL-MCNC: 20.3 MG/DL — HIGH (ref 0.2–1.2)
BUN SERPL-MCNC: 11 MG/DL — SIGNIFICANT CHANGE UP (ref 7–23)
CALCIUM SERPL-MCNC: 8.3 MG/DL — LOW (ref 8.4–10.5)
CANCER AG19-9 SERPL-ACNC: 1648.7 U/ML — HIGH
CHLORIDE SERPL-SCNC: 104 MMOL/L — SIGNIFICANT CHANGE UP (ref 96–108)
CO2 SERPL-SCNC: 20 MMOL/L — LOW (ref 22–31)
CREAT SERPL-MCNC: 0.73 MG/DL — SIGNIFICANT CHANGE UP (ref 0.5–1.3)
GLUCOSE SERPL-MCNC: 122 MG/DL — HIGH (ref 70–99)
HCT VFR BLD CALC: 33.4 % — LOW (ref 39–50)
HCT VFR BLD CALC: 39 % — SIGNIFICANT CHANGE UP (ref 39–50)
HGB BLD-MCNC: 12 G/DL — LOW (ref 13–17)
HGB BLD-MCNC: 13 G/DL — SIGNIFICANT CHANGE UP (ref 13–17)
MCHC RBC-ENTMCNC: 32 PG — SIGNIFICANT CHANGE UP (ref 27–34)
MCHC RBC-ENTMCNC: 32.7 PG — SIGNIFICANT CHANGE UP (ref 27–34)
MCHC RBC-ENTMCNC: 33.5 GM/DL — SIGNIFICANT CHANGE UP (ref 32–36)
MCHC RBC-ENTMCNC: 35.9 GM/DL — SIGNIFICANT CHANGE UP (ref 32–36)
MCV RBC AUTO: 89.1 FL — SIGNIFICANT CHANGE UP (ref 80–100)
MCV RBC AUTO: 97.7 FL — SIGNIFICANT CHANGE UP (ref 80–100)
PLATELET # BLD AUTO: 299 K/UL — SIGNIFICANT CHANGE UP (ref 150–400)
PLATELET # BLD AUTO: 299 K/UL — SIGNIFICANT CHANGE UP (ref 150–400)
POTASSIUM SERPL-MCNC: 3.8 MMOL/L — SIGNIFICANT CHANGE UP (ref 3.5–5.3)
POTASSIUM SERPL-SCNC: 3.8 MMOL/L — SIGNIFICANT CHANGE UP (ref 3.5–5.3)
PROT SERPL-MCNC: 5.3 G/DL — LOW (ref 6–8.3)
RBC # BLD: 3.75 M/UL — LOW (ref 4.2–5.8)
RBC # BLD: 3.99 M/UL — LOW (ref 4.2–5.8)
RBC # FLD: 13.4 % — SIGNIFICANT CHANGE UP (ref 10.3–14.5)
RBC # FLD: 16.2 % — HIGH (ref 10.3–14.5)
SODIUM SERPL-SCNC: 138 MMOL/L — SIGNIFICANT CHANGE UP (ref 135–145)
WBC # BLD: 8.16 K/UL — SIGNIFICANT CHANGE UP (ref 3.8–10.5)
WBC # BLD: 8.5 K/UL — SIGNIFICANT CHANGE UP (ref 3.8–10.5)
WBC # FLD AUTO: 8.16 K/UL — SIGNIFICANT CHANGE UP (ref 3.8–10.5)
WBC # FLD AUTO: 8.5 K/UL — SIGNIFICANT CHANGE UP (ref 3.8–10.5)

## 2018-08-06 RX ORDER — HEPARIN SODIUM 5000 [USP'U]/ML
1100 INJECTION INTRAVENOUS; SUBCUTANEOUS
Qty: 25000 | Refills: 0 | Status: DISCONTINUED | OUTPATIENT
Start: 2018-08-06 | End: 2018-08-07

## 2018-08-06 RX ADMIN — CARVEDILOL PHOSPHATE 6.25 MILLIGRAM(S): 80 CAPSULE, EXTENDED RELEASE ORAL at 17:23

## 2018-08-06 RX ADMIN — HEPARIN SODIUM 1100 UNIT(S)/HR: 5000 INJECTION INTRAVENOUS; SUBCUTANEOUS at 04:17

## 2018-08-06 RX ADMIN — HEPARIN SODIUM 1100 UNIT(S)/HR: 5000 INJECTION INTRAVENOUS; SUBCUTANEOUS at 11:39

## 2018-08-06 RX ADMIN — HEPARIN SODIUM 0 UNIT(S)/HR: 5000 INJECTION INTRAVENOUS; SUBCUTANEOUS at 03:07

## 2018-08-06 RX ADMIN — CARVEDILOL PHOSPHATE 6.25 MILLIGRAM(S): 80 CAPSULE, EXTENDED RELEASE ORAL at 05:08

## 2018-08-06 RX ADMIN — HEPARIN SODIUM 900 UNIT(S)/HR: 5000 INJECTION INTRAVENOUS; SUBCUTANEOUS at 18:21

## 2018-08-06 RX ADMIN — CARVEDILOL PHOSPHATE 6.25 MILLIGRAM(S): 80 CAPSULE, EXTENDED RELEASE ORAL at 18:19

## 2018-08-06 NOTE — PROVIDER CONTACT NOTE (CRITICAL VALUE NOTIFICATION) - RECOMMENDATIONS
Follow heparin full ac nomogram
follow heparin nomogram and hold for one hour and restart at a rate of 14cc/hr
follow heparin nomogram and reduce gtt rate to 9cc/hr

## 2018-08-06 NOTE — PROGRESS NOTE ADULT - ASSESSMENT
· Assessment		  74 y/o male PMHx Afib on xarelto, HTN, former cigar smoker (was not a cigarette smoker except [place] [employer] now presenting with jaundice and found to have pancreatic mass concerning for pancreatic cancer with metastases on imaging at outside hospital       Atrial fibrillation with RVR.  Afib w/RVR resolved  Continue carvedilol  Holding xarelto for possible procedure/biopsy with GI. On Heparin        HTN (hypertension).  Continue coreg.       Obstructive jaundice due to malignant neoplasm.  GI consult noted. ERCP pending     Malignant neoplasm of body of pancreas.  Oncology follow Dr. Schneider.  d/w patient/family QA  Adtiya Frederick MD pager [phone]

## 2018-08-06 NOTE — PROGRESS NOTE ADULT - ASSESSMENT
74 yo male with obstructive jaundice s/p CT at OSH that showed a pancreatic mass and biliary obstruction.  ?omental disease.    1) I spoke to Dr Kebede from [facility]- will arrange fro EUS/ERCP  2) Ok to resume PO today.  NPO after MN  3) Monitor LFTs, CBC  3) Will need to hold heparin gtt prior to the procedure.  D/w pt and family at the bedside.

## 2018-08-06 NOTE — CHART NOTE - NSCHARTNOTEFT_GEN_A_CORE
Called by RN to report critical lab value PTT> 123.8, patient on heparin gtt. Seen and examined patient at bedside. Patient is asymptomatic, no s/s of active bleeding, RN will  readjust heparin gtt dose according to normogram. Will continue to monitor.  Vital Signs Last 24 Hrs  T(C): 37.1 (06 Aug 2018 11:50), Max: 37.1 (06 Aug 2018 11:50)  T(F): 98.8 (06 Aug 2018 11:50), Max: 98.8 (06 Aug 2018 11:50)  HR: 92 (06 Aug 2018 17:30) (56 - 100)  BP: 128/73 (06 Aug 2018 17:30) (114/72 - 133/71)  BP(mean): --  RR: 18 (06 Aug 2018 11:50) (16 - 18)  SpO2: 97% (06 Aug 2018 11:50) (93% - 97%)                          13.0   8.5   )-----------( 299      ( 06 Aug 2018 11:16 )             39.0       08-06    138  |  104  |  11  ----------------------------<  122<H>  3.8   |  20<L>  |  0.73    Ca    8.3<L>      06 Aug 2018 06:27  Phos  3.1     08-05  Mg     1.8     08-05    TPro  5.3<L>  /  Alb  3.0<L>  /  TBili  20.3<H>  /  DBili  >10.0<H>  /  AST  67<H>  /  ALT  68<H>  /  AlkPhos  364<H>  08-06      PT/INR - ( 04 Aug 2018 21:57 )   PT: 12.9 sec;   INR: 1.18 ratio         PTT - ( 06 Aug 2018 17:36 )  PTT:123.8 sec

## 2018-08-07 ENCOUNTER — RESULT REVIEW (OUTPATIENT)
Age: 73
End: 2018-08-07

## 2018-08-07 LAB
ALBUMIN SERPL ELPH-MCNC: 3 G/DL — LOW (ref 3.3–5)
ALP SERPL-CCNC: 406 U/L — HIGH (ref 40–120)
ALT FLD-CCNC: 71 U/L — HIGH (ref 10–45)
ANION GAP SERPL CALC-SCNC: 12 MMOL/L — SIGNIFICANT CHANGE UP (ref 5–17)
APTT BLD: 69.5 SEC — HIGH (ref 27.5–37.4)
APTT BLD: 76.1 SEC — HIGH (ref 27.5–37.4)
AST SERPL-CCNC: 67 U/L — HIGH (ref 10–40)
BILIRUB SERPL-MCNC: 25.2 MG/DL — HIGH (ref 0.2–1.2)
BUN SERPL-MCNC: 10 MG/DL — SIGNIFICANT CHANGE UP (ref 7–23)
CALCIUM SERPL-MCNC: 8.7 MG/DL — SIGNIFICANT CHANGE UP (ref 8.4–10.5)
CHLORIDE SERPL-SCNC: 103 MMOL/L — SIGNIFICANT CHANGE UP (ref 96–108)
CO2 SERPL-SCNC: 22 MMOL/L — SIGNIFICANT CHANGE UP (ref 22–31)
CREAT SERPL-MCNC: 0.78 MG/DL — SIGNIFICANT CHANGE UP (ref 0.5–1.3)
GLUCOSE SERPL-MCNC: 133 MG/DL — HIGH (ref 70–99)
HCT VFR BLD CALC: 38 % — LOW (ref 39–50)
HGB BLD-MCNC: 13.5 G/DL — SIGNIFICANT CHANGE UP (ref 13–17)
INR BLD: 1.28 RATIO — HIGH (ref 0.88–1.16)
MCHC RBC-ENTMCNC: 32.2 PG — SIGNIFICANT CHANGE UP (ref 27–34)
MCHC RBC-ENTMCNC: 35.5 GM/DL — SIGNIFICANT CHANGE UP (ref 32–36)
MCV RBC AUTO: 90.7 FL — SIGNIFICANT CHANGE UP (ref 80–100)
PLATELET # BLD AUTO: 374 K/UL — SIGNIFICANT CHANGE UP (ref 150–400)
POTASSIUM SERPL-MCNC: 3.8 MMOL/L — SIGNIFICANT CHANGE UP (ref 3.5–5.3)
POTASSIUM SERPL-SCNC: 3.8 MMOL/L — SIGNIFICANT CHANGE UP (ref 3.5–5.3)
PROT SERPL-MCNC: 5.8 G/DL — LOW (ref 6–8.3)
PROTHROM AB SERPL-ACNC: 14 SEC — HIGH (ref 9.8–12.7)
RBC # BLD: 4.19 M/UL — LOW (ref 4.2–5.8)
RBC # FLD: 16.5 % — HIGH (ref 10.3–14.5)
SODIUM SERPL-SCNC: 137 MMOL/L — SIGNIFICANT CHANGE UP (ref 135–145)
WBC # BLD: 11.46 K/UL — HIGH (ref 3.8–10.5)
WBC # FLD AUTO: 11.46 K/UL — HIGH (ref 3.8–10.5)

## 2018-08-07 PROCEDURE — 88307 TISSUE EXAM BY PATHOLOGIST: CPT | Mod: 26

## 2018-08-07 PROCEDURE — 88305 TISSUE EXAM BY PATHOLOGIST: CPT | Mod: 26

## 2018-08-07 PROCEDURE — 43274 ERCP DUCT STENT PLACEMENT: CPT | Mod: GC

## 2018-08-07 PROCEDURE — 74328 X-RAY BILE DUCT ENDOSCOPY: CPT | Mod: 26,GC

## 2018-08-07 PROCEDURE — 43242 EGD US FINE NEEDLE BX/ASPIR: CPT | Mod: GC

## 2018-08-07 PROCEDURE — 88173 CYTOPATH EVAL FNA REPORT: CPT | Mod: 26

## 2018-08-07 RX ORDER — ONDANSETRON 8 MG/1
4 TABLET, FILM COATED ORAL ONCE
Qty: 0 | Refills: 0 | Status: DISCONTINUED | OUTPATIENT
Start: 2018-08-07 | End: 2018-08-07

## 2018-08-07 RX ORDER — SODIUM CHLORIDE 9 MG/ML
1000 INJECTION, SOLUTION INTRAVENOUS
Qty: 0 | Refills: 0 | Status: DISCONTINUED | OUTPATIENT
Start: 2018-08-07 | End: 2018-08-09

## 2018-08-07 RX ORDER — METOPROLOL TARTRATE 50 MG
5 TABLET ORAL ONCE
Qty: 0 | Refills: 0 | Status: COMPLETED | OUTPATIENT
Start: 2018-08-07 | End: 2018-08-08

## 2018-08-07 RX ORDER — HYDROMORPHONE HYDROCHLORIDE 2 MG/ML
0.25 INJECTION INTRAMUSCULAR; INTRAVENOUS; SUBCUTANEOUS
Qty: 0 | Refills: 0 | Status: DISCONTINUED | OUTPATIENT
Start: 2018-08-07 | End: 2018-08-07

## 2018-08-07 RX ADMIN — Medication 30 MILLILITER(S): at 05:40

## 2018-08-07 RX ADMIN — HEPARIN SODIUM 900 UNIT(S)/HR: 5000 INJECTION INTRAVENOUS; SUBCUTANEOUS at 01:02

## 2018-08-07 RX ADMIN — SODIUM CHLORIDE 125 MILLILITER(S): 9 INJECTION, SOLUTION INTRAVENOUS at 21:04

## 2018-08-07 RX ADMIN — SODIUM CHLORIDE 80 MILLILITER(S): 9 INJECTION INTRAMUSCULAR; INTRAVENOUS; SUBCUTANEOUS at 20:26

## 2018-08-07 NOTE — PROGRESS NOTE ADULT - ASSESSMENT
· Assessment		  72 y/o male PMHx Afib on xarelto, HTN, former cigar smoker (was not a cigarette smoker except [place] now presenting with jaundice and found to have pancreatic mass concerning for pancreatic cancer with metastases on imaging at outside hospital       Atrial fibrillation with RVR.  Afib w/RVR resolved  Continue carvedilol  Holding xarelto for possible procedure/biopsy with GI. On Heparin        HTN (hypertension).  Continue coreg.       Obstructive jaundice due to malignant neoplasm.  GI consult noted. ERCP pending     Malignant neoplasm of body of pancreas.  Oncology follow Dr. Schneider.  d/w patient/ wife QA  Aditya Frederick MD pager [phone]

## 2018-08-07 NOTE — PROGRESS NOTE ADULT - ASSESSMENT
74 yo male with obstructive jaundice s/p CT at OSH that showed a pancreatic mass and biliary obstruction.  ?omental disease.    I spoke to Dr Kebede from [facility]-  EUS/ERCP later today  D/w pt and family at the bedside.

## 2018-08-07 NOTE — CHART NOTE - NSCHARTNOTEFT_GEN_A_CORE
GI Procedure Note (Full note to follow on sunrise)    Procedure: EUS/ERCP    Indication: Obstructive Jaundice due to pancreas head lesion. EUS/ERCP scheduled for further evaluation.     Medications: General Anesthesia, Levaquin 500mg IVx1, Fluoro: 102mGy/12.9min    Findings:    EGD:  - Normal esophagus.  - J- shaped stomach.  - Mild extrinsic compression in duodenal sweep. Normal D2.     ERCP:  - The  film was normal.  The esophagus was successfully intubated under direct vision.  The scope was advanced to the major papilla in the descending duodenum without detailed examination of the pharynx, larynx and associated structures, and upper GI tract. The ampulla was small. Biliary cannulation was technically difficult. Multiple attempts at biliary cannulation with a 44Rx Hydratome and a 39Rx Hydratome were unsuccessful. Decision was made to proceed with EUS guided rendezvous technique.    EUS:  - EUS was performed using a linear echoendoscope. Echoendoscope was advanced to the duodenal bulb. A 3cm x 3cm ill defined lesion was noted in the head of the pancreas. The lesion was hypoechoic and heterogeneous in echotexture. The borders of the lesion were irregular and poorly defined. The lesion invaded the portal vein and the superior mesenteric vein. The proximal common bile duct was dilated to 15mm. There was narrowing of the distal CBD due to pancreas head lesion. FNA x2 with a 25 gauge needle and FNB x2 with a 25 gauge core needle were made into the lesion. Aspirate was sent to cytology.    The dilated proximal bile duct was targeted for EUS guided rendezvous technique. A 19 gauge needle was punctured into the bile duct. Aspiration confirmed bilious fluid. A guidewire was traversed into the common bile duct. Multiple attempts were made to pass the guidewire into the distal CBD however this was unsuccessful. The guidewire was advanced up the left intrahepatics. Decision was made to proceed with direct biliary drainage with a choledochoduodenostomy.     The tract between the duodenal bulb and bile duct was dilated a 6Fr Soehendra catheter. Subsequently, the tract was further dilated using a 4mm Hurricane balloon. A 10mm x 6cm fully covered metal stent was placed into the bile duct from the duodenal bulb under endoscopic and fluoroscopic guidance forming a choledochoduodenostomy. There were copious amounts of bile draining from the stent. Fluoroscopy confirmed excellent position.     Impression:  - A 3cm x 3cm ill defined lesion was noted in the head of the pancreas with evidence of vascular invasion (portal vein and superior mesenteric vein) s/p FNA x2 and FNB x2.  - Biliary obstruction due to pancreas head lesion s/p unsuccessful biliary cannulation, unsuccessful EUS guided rendezvous technique.  - Successful placement of a 10mm x 6cm fully covered metal stent into the bile duct from the duodenal bulb forming a choledochoduodenostomy.     Recommedations:  - [facility] patient to [facility] for [facility] care.   - NPO today.   - IV hydration with LR.  - Monitor LFTs.  - Hold heparin today.   - Oncology recommendations.    Khai Pineda  [facility] GI Procedure Note (Full note to follow on sunrise)    Procedure: EUS/ERCP    Indication: Obstructive Jaundice due to pancreas head lesion. EUS/ERCP scheduled for further evaluation.     Medications: General Anesthesia, Levaquin 500mg IVx1, Fluoro: 102mGy/12.9min    Findings:    EGD:  - Normal esophagus.  - J- shaped stomach.  - Mild extrinsic compression in duodenal sweep. Normal D2.     ERCP:  - The  film was normal.  The esophagus was successfully intubated under direct vision.  The scope was advanced to the major papilla in the descending duodenum without detailed examination of the pharynx, larynx and associated structures, and upper GI tract. The ampulla was small. Biliary cannulation was technically difficult. Multiple attempts at biliary cannulation with a 44Rx Hydratome and a 39Rx Hydratome were unsuccessful. Decision was made to proceed with EUS guided rendezvous technique.    EUS:  - EUS was performed using a linear echoendoscope. Echoendoscope was advanced to the duodenal bulb. A 3cm x 3cm ill defined lesion was noted in the head of the pancreas. The lesion was hypoechoic and heterogeneous in echotexture. The borders of the lesion were irregular and poorly defined. The lesion invaded the portal vein and the superior mesenteric vein. The proximal common bile duct was dilated to 15mm. There was narrowing of the distal CBD due to pancreas head lesion. FNA x2 with a 25 gauge needle and FNB x2 with a 25 gauge core needle were made into the lesion. Aspirate was sent to cytology.    The dilated proximal bile duct was targeted for EUS guided rendezvous technique. A 19 gauge needle was punctured into the bile duct. Aspiration confirmed bilious fluid. A guidewire was traversed into the common bile duct. Multiple attempts were made to pass the guidewire into the distal CBD however this was unsuccessful. The guidewire was advanced up the left intrahepatics. Decision was made to proceed with direct biliary drainage with a choledochoduodenostomy.     The tract between the duodenal bulb and bile duct was dilated with a 6Fr Soehendra catheter. Subsequently, the tract was further dilated using a 4mm Hurricane balloon. Then, a 10mm x 6cm fully covered metal stent was placed into the bile duct from the duodenal bulb under endoscopic and fluoroscopic guidance forming a choledochoduodenostomy. There were copious amounts of bile draining from the stent. Fluoroscopy confirmed excellent position.     Impression:  - A 3cm x 3cm ill defined lesion was noted in the head of the pancreas with evidence of vascular invasion (portal vein and superior mesenteric vein) s/p FNA x2 and FNB x2.  - Biliary obstruction due to pancreas head lesion s/p unsuccessful biliary cannulation, unsuccessful EUS guided rendezvous technique.  - Successful placement of a 10mm x 6cm fully covered metal stent into the bile duct from the duodenal bulb forming a choledochoduodenostomy.     Recommedations:  - Return patient to [facility] for ongoing care.   - NPO today.   - IV hydration with LR.  - Monitor LFTs.  - Hold heparin today.   - Oncology recommendations.    Khai Pineda  [facility] Fellow

## 2018-08-07 NOTE — CHART NOTE - NSCHARTNOTEFT_GEN_A_CORE
[id number]  ESTER ROYCE    Patient has a history of Afib (on Xarelto, which is currently being held secondary to EUS/ERCP performed earlier today). He was placed on a heparin gtt, however this has been discontinued today per GI recommendations post EUS/ERCP. Patient is not currently on telemetry.  Case and plan of care discussed with attending, Dr. Frederick; will place patient on telemetry.  SCD ordered since no current AC in place  Will continue to monitor patient  Follow up on GI recommendations regarding AC  Will discuss with primary team in CHRISTOPHER Miguel PA-C  Dept of Medicine  [phone]

## 2018-08-08 LAB
ALBUMIN SERPL ELPH-MCNC: 2.8 G/DL — LOW (ref 3.3–5)
ALP SERPL-CCNC: 356 U/L — HIGH (ref 40–120)
ALT FLD-CCNC: 58 U/L — HIGH (ref 10–45)
ANION GAP SERPL CALC-SCNC: 12 MMOL/L — SIGNIFICANT CHANGE UP (ref 5–17)
APTT BLD: 133.3 SEC — CRITICAL HIGH (ref 27.5–37.4)
AST SERPL-CCNC: 61 U/L — HIGH (ref 10–40)
BILIRUB SERPL-MCNC: 24.2 MG/DL — HIGH (ref 0.2–1.2)
BUN SERPL-MCNC: 15 MG/DL — SIGNIFICANT CHANGE UP (ref 7–23)
CALCIUM SERPL-MCNC: 8.3 MG/DL — LOW (ref 8.4–10.5)
CHLORIDE SERPL-SCNC: 104 MMOL/L — SIGNIFICANT CHANGE UP (ref 96–108)
CO2 SERPL-SCNC: 21 MMOL/L — LOW (ref 22–31)
CREAT SERPL-MCNC: 0.8 MG/DL — SIGNIFICANT CHANGE UP (ref 0.5–1.3)
GLUCOSE SERPL-MCNC: 129 MG/DL — HIGH (ref 70–99)
HCT VFR BLD CALC: 35.2 % — LOW (ref 39–50)
HCT VFR BLD CALC: 38.1 % — LOW (ref 39–50)
HGB BLD-MCNC: 12.8 G/DL — LOW (ref 13–17)
HGB BLD-MCNC: 12.9 G/DL — LOW (ref 13–17)
INR BLD: 1.3 RATIO — HIGH (ref 0.88–1.16)
MCHC RBC-ENTMCNC: 32.4 PG — SIGNIFICANT CHANGE UP (ref 27–34)
MCHC RBC-ENTMCNC: 32.9 PG — SIGNIFICANT CHANGE UP (ref 27–34)
MCHC RBC-ENTMCNC: 33.7 GM/DL — SIGNIFICANT CHANGE UP (ref 32–36)
MCHC RBC-ENTMCNC: 36.4 GM/DL — HIGH (ref 32–36)
MCV RBC AUTO: 89.1 FL — SIGNIFICANT CHANGE UP (ref 80–100)
MCV RBC AUTO: 97.6 FL — SIGNIFICANT CHANGE UP (ref 80–100)
PLATELET # BLD AUTO: 326 K/UL — SIGNIFICANT CHANGE UP (ref 150–400)
PLATELET # BLD AUTO: 352 K/UL — SIGNIFICANT CHANGE UP (ref 150–400)
POTASSIUM SERPL-MCNC: 4.1 MMOL/L — SIGNIFICANT CHANGE UP (ref 3.5–5.3)
POTASSIUM SERPL-SCNC: 4.1 MMOL/L — SIGNIFICANT CHANGE UP (ref 3.5–5.3)
PROT SERPL-MCNC: 5.1 G/DL — LOW (ref 6–8.3)
PROTHROM AB SERPL-ACNC: 14.8 SEC — HIGH (ref 10–13.1)
RBC # BLD: 3.9 M/UL — LOW (ref 4.2–5.8)
RBC # BLD: 3.95 M/UL — LOW (ref 4.2–5.8)
RBC # FLD: 14.4 % — SIGNIFICANT CHANGE UP (ref 10.3–14.5)
RBC # FLD: 17.7 % — HIGH (ref 10.3–14.5)
SODIUM SERPL-SCNC: 137 MMOL/L — SIGNIFICANT CHANGE UP (ref 135–145)
WBC # BLD: 12.01 K/UL — HIGH (ref 3.8–10.5)
WBC # BLD: 14.1 K/UL — HIGH (ref 3.8–10.5)
WBC # FLD AUTO: 12.01 K/UL — HIGH (ref 3.8–10.5)
WBC # FLD AUTO: 14.1 K/UL — HIGH (ref 3.8–10.5)

## 2018-08-08 RX ORDER — HEPARIN SODIUM 5000 [USP'U]/ML
7000 INJECTION INTRAVENOUS; SUBCUTANEOUS EVERY 6 HOURS
Qty: 0 | Refills: 0 | Status: DISCONTINUED | OUTPATIENT
Start: 2018-08-08 | End: 2018-08-10

## 2018-08-08 RX ORDER — HEPARIN SODIUM 5000 [USP'U]/ML
INJECTION INTRAVENOUS; SUBCUTANEOUS
Qty: 25000 | Refills: 0 | Status: DISCONTINUED | OUTPATIENT
Start: 2018-08-08 | End: 2018-08-10

## 2018-08-08 RX ORDER — SODIUM CHLORIDE 9 MG/ML
500 INJECTION INTRAMUSCULAR; INTRAVENOUS; SUBCUTANEOUS ONCE
Qty: 0 | Refills: 0 | Status: COMPLETED | OUTPATIENT
Start: 2018-08-08 | End: 2018-08-08

## 2018-08-08 RX ORDER — HEPARIN SODIUM 5000 [USP'U]/ML
3500 INJECTION INTRAVENOUS; SUBCUTANEOUS EVERY 6 HOURS
Qty: 0 | Refills: 0 | Status: DISCONTINUED | OUTPATIENT
Start: 2018-08-08 | End: 2018-08-10

## 2018-08-08 RX ADMIN — ONDANSETRON 4 MILLIGRAM(S): 8 TABLET, FILM COATED ORAL at 02:59

## 2018-08-08 RX ADMIN — Medication 5 MILLIGRAM(S): at 00:04

## 2018-08-08 RX ADMIN — SODIUM CHLORIDE 2000 MILLILITER(S): 9 INJECTION INTRAMUSCULAR; INTRAVENOUS; SUBCUTANEOUS at 04:49

## 2018-08-08 RX ADMIN — HEPARIN SODIUM 0 UNIT(S)/HR: 5000 INJECTION INTRAVENOUS; SUBCUTANEOUS at 17:05

## 2018-08-08 RX ADMIN — CARVEDILOL PHOSPHATE 6.25 MILLIGRAM(S): 80 CAPSULE, EXTENDED RELEASE ORAL at 16:59

## 2018-08-08 RX ADMIN — ONDANSETRON 4 MILLIGRAM(S): 8 TABLET, FILM COATED ORAL at 19:37

## 2018-08-08 RX ADMIN — HEPARIN SODIUM 1300 UNIT(S)/HR: 5000 INJECTION INTRAVENOUS; SUBCUTANEOUS at 18:06

## 2018-08-08 RX ADMIN — HEPARIN SODIUM 1600 UNIT(S)/HR: 5000 INJECTION INTRAVENOUS; SUBCUTANEOUS at 09:26

## 2018-08-08 RX ADMIN — Medication 30 MILLILITER(S): at 19:37

## 2018-08-08 RX ADMIN — Medication 30 MILLILITER(S): at 02:59

## 2018-08-08 NOTE — CHART NOTE - NSCHARTNOTEFT_GEN_A_CORE
[id number]  ROYCE NORMAN    Notified by RN patient with swelling of right hand. Patient seen and examined at bedside, A&O x3, NAD. He stated he is "trying to sleep."  Patient stated that he did not notice the swelling; it was noticed by his wife this evening. It is unclear how long the swelling has been persisted.  He denied right hand numbness, paresthesias, or weakness. He endorsed tightness of the right hand when trying to form a fist.      Physical Exam:  General: (+) jaundice, WN/WD NAD, AOx3, nontoxic appearing  Head:  NC/AT  Right hand:      Patient seen and examined.  Patient had no complaints of     Right hand edema Plan of Care/ Interventions:  >?secondary to IV vs DVT (less likely since patient has been on AC with heparin gtt and xarelto prior to heparin gtt)  >Elevate RUE  >Will obtain RUE doppler to r/o DVT  >Remove IVL from right hand  >Monitor         Jovany Miguel PA-C  Dept of Medicine [id number]  ROYCE NORMAN    Notified by RN patient with swelling of right hand. Patient seen and examined at bedside, A&O x3, NAD. He stated he is "trying to sleep."  Patient stated that he did not notice the swelling; it was noticed by his wife this evening. It is unclear how long the swelling has been persisted.  He denied right hand numbness, paresthesias, or weakness. He endorsed tightness of the right hand when trying to form a fist.      Physical Exam:  General: (+) jaundice, WN/WD NAD, AOx3, nontoxic appearing  Head:  NC/AT  Right hand: (+) edema of hand distal to wrist. (+) IVL on dorsal aspect of hand. FROM. Radial pulse intact. Brisk capillary refill. No erythema, ecchymoses, or open skin.      Right hand edema Plan of Care/ Interventions:  >?secondary to IVL vs DVT (less likely since patient has been on AC with heparin gtt and Xarelto prior to heparin gtt)  >Elevate RUE  >Will obtain RUE doppler to r/o DVT  >Remove IVL from right hand  >Monitor hand for worsening of edema or development of new symptoms  >Will endorse to primary team in AM      Jovany Miguel PA-C  Dept of Medicine  [phone]

## 2018-08-08 NOTE — PROGRESS NOTE ADULT - ASSESSMENT
· Assessment		  74 y/o male PMHx Afib on xarelto, HTN, former cigar smoker (was not a cigarette smoker [place] now presenting with jaundice and found to have pancreatic mass concerning for pancreatic cancer with metastases on imaging at outside hospital       Atrial fibrillation with RVR.  Afib w/RVR resolved  Continue carvedilol  Holding xarelto.  On Heparin        HTN (hypertension).  Continue coreg.       Obstructive jaundice due to malignant neoplasm.  s/p. ERCP  Path pending   Follow LFT, CBC    Clears    Malignant neoplasm of body of pancreas.  Oncology follow Dr. Schneider.  d/w patient/ wife QA  Aditya Frederick MD pager [phone]

## 2018-08-08 NOTE — CHART NOTE - NSCHARTNOTEFT_GEN_A_CORE
Called by RN to report critical lab value PTT> 133.3, patient on heparin gtt. Seen and examined patient at bedside. Patient is asymptomatic, no s/s of active bleeding, RN will  readjust heparin gtt dose according to normogram. Will continue to monitor.  Vital Signs Last 24 Hrs  T(C): 37 (08 Aug 2018 11:43), Max: 37 (07 Aug 2018 23:58)  T(F): 98.6 (08 Aug 2018 11:43), Max: 98.6 (07 Aug 2018 23:58)  HR: 96 (08 Aug 2018 16:50) (92 - 130)  BP: 126/88 (08 Aug 2018 16:50) (92/57 - 126/88)  BP(mean): 93 (07 Aug 2018 21:20) (74 - 93)  RR: 18 (08 Aug 2018 11:43) (14 - 19)  SpO2: 93% (08 Aug 2018 11:43) (93% - 99%)                          12.9   14.1  )-----------( 326      ( 08 Aug 2018 16:32 )             38.1       08-08    137  |  104  |  15  ----------------------------<  129<H>  4.1   |  21<L>  |  0.80    Ca    8.3<L>      08 Aug 2018 06:43    TPro  5.1<L>  /  Alb  2.8<L>  /  TBili  24.2<H>  /  DBili  x   /  AST  61<H>  /  ALT  58<H>  /  AlkPhos  356<H>  08-08      PT/INR - ( 08 Aug 2018 08:09 )   PT: 14.8 sec;   INR: 1.30 ratio         PTT - ( 08 Aug 2018 16:32 )  PTT:133.3 sec

## 2018-08-08 NOTE — CHART NOTE - NSCHARTNOTEFT_GEN_A_CORE
Patient is a 73y old  Male who presents with a chief complaint of Jaundice, lethargy/weakness (04 Aug 2018 22:20)  Notified by RN that patient with HR up to 150s Afib on telemetry; patient has a known history of Afib.  Patient has been resting in bed this evening and is asymptomatic.  Patient seen and assessed at bedside, laying in bed, NAD, A&O x3. Wife at bedside.  He denied chest pain, palpitations, headache, dizziness, shortness of breath, nausea, vomiting, or abdominal pain. He endorsed thirst.   Patient is s/p EUS/ERCP today for obstructive jaundice due to pancreas head lesion.   Of note, he did not receive PM dose of Coreg due to parameters for BP.   Patient was placed on telemetry earlier in the evening. Xarelto was being held in anticipation of EUS/ERCP and patient was on a heparin gtt, which was discontinued today per GI recommendations post procedure.         Vital Signs Last 24 Hrs  T(C): 37 (07 Aug 2018 23:58), Max: 37.4 (07 Aug 2018 15:49)  T(F): 98.6 (07 Aug 2018 23:58), Max: 99.4 (07 Aug 2018 15:49)  HR: 106 (07 Aug 2018 23:58) (85 - 128)  BP: 103/64 (07 Aug 2018 23:58) (96/59 - 120/87)  BP(mean): 93 (07 Aug 2018 21:20) (74 - 93)  RR: 18 (07 Aug 2018 23:58) (14 - 19)  SpO2: 94% (07 Aug 2018 23:58) (93% - 99%)      Physical Exam:  General: (+) Jaundice, WN/WD, NAD, nontoxic appearing  Neurology: A&Ox3, nonfocal, DENNEY x 4  Head:  Normocephalic, atraumatic  Respiratory: CTA B/L  CV: (+) tachycardic, (+) irregular  Abdominal: Soft, NT, ND no palpable mass  MSK: No edema, + peripheral pulses, FROM all 4 extremity        Labs:                        13.5   11.46 )-----------( 374      ( 07 Aug 2018 08:16 )             38.0     08-07    137  |  103  |  10  ----------------------------<  133<H>  3.8   |  22  |  0.78    Ca    8.7      07 Aug 2018 06:58    TPro  5.8<L>  /  Alb  3.0<L>  /  TBili  25.2<H>  /  DBili  x   /  AST  67<H>  /  ALT  71<H>  /  AlkPhos  406<H>  08-07              Assessment & Plan:  HPI:  74 y/o male PMHx Afib on xarelto, HTN, former cigar smoker (was not a cigarette smoker [place] now presenting with jaundice and found to have pancreatic mass concerning for pancreatic cancer with metastases on imaging at outside hospital. Patient and family report that patient had been feeling mildly unwell over the last 2-3 weeks, with feelings of lethargy and generalized achiness and weakness. Patient had also noticed weight loss of 20 pounds over the last few months though he had been attempting to lose weight on purpose so initially he thought it was related to his weight loss attempts. However, he has also had poor appetite for a few months. Over the last 2-3 weeks he began to feel more tired/lethargic and just felt achy. He decided to take a vacation to [facility] with his wife to recuperate, however after going [facility] he began to feel worse over the last 2-3 days with worsening lethargy and weakness. He was then noted to have yellowish eyes and skin by his wife over the last 1-2 days and he was noted to have darker colored urine.     Patient now with Afib on telemetry with HR up to 150s. Patient is asymptomatic, A&O x3, NAD. PM Coreg not given secondary to parameters for BP.       Afib A/P:  >Telemetry with Afib 110-130s  >Vital signs hemodynamically stable with the exception of HR  >Lopressor 5mg IVP x1  >Continue with telemetry  >Continue with Coreg   >Continue with IV hydration  >Consider cardiology consult  >Follow up GI recommendations regarding anticoagulation   >Continue to monitor and observe patient  >Will endorse to primary team in AM        Jovany Miguel PA-C  Dept of Medicine   [id number] Patient is a 73y old  Male who presents with a chief complaint of Jaundice, lethargy/weakness (04 Aug 2018 22:20)  Notified by RN that patient with HR up to 150s Afib on telemetry; patient has a known history of Afib.  Patient has been resting in bed this evening and is asymptomatic.  Patient seen and assessed at bedside, laying in bed, NAD, A&O x3. Wife at bedside.  He denied chest pain, palpitations, headache, dizziness, shortness of breath, nausea, vomiting, or abdominal pain. He endorsed thirst and fatigue.   Patient is s/p EUS/ERCP today for obstructive jaundice due to pancreas head lesion.   Of note, he did not receive PM dose of Coreg due to parameters for BP.   Patient was placed on telemetry earlier in the evening. Xarelto was being held in anticipation of EUS/ERCP and patient was on a heparin gtt, which was discontinued today per GI recommendations post procedure.         Vital Signs Last 24 Hrs  T(C): 37 (07 Aug 2018 23:58), Max: 37.4 (07 Aug 2018 15:49)  T(F): 98.6 (07 Aug 2018 23:58), Max: 99.4 (07 Aug 2018 15:49)  HR: 106 (07 Aug 2018 23:58) (85 - 128)  BP: 103/64 (07 Aug 2018 23:58) (96/59 - 120/87)  BP(mean): 93 (07 Aug 2018 21:20) (74 - 93)  RR: 18 (07 Aug 2018 23:58) (14 - 19)  SpO2: 94% (07 Aug 2018 23:58) (93% - 99%)      Physical Exam:  General: (+) Jaundice, WN/WD, NAD, nontoxic appearing  Neurology: A&Ox3, nonfocal, DENNEY x 4  Head:  Normocephalic, atraumatic  Respiratory: CTA B/L  CV: (+) tachycardic, (+) irregular  Abdominal: Soft, NT, ND no palpable mass  MSK: No edema, + peripheral pulses, FROM all 4 extremity        Labs:                        13.5   11.46 )-----------( 374      ( 07 Aug 2018 08:16 )             38.0     08-07    137  |  103  |  10  ----------------------------<  133<H>  3.8   |  22  |  0.78    Ca    8.7      07 Aug 2018 06:58    TPro  5.8<L>  /  Alb  3.0<L>  /  TBili  25.2<H>  /  DBili  x   /  AST  67<H>  /  ALT  71<H>  /  AlkPhos  406<H>  08-07              Assessment & Plan:  HPI:  72 y/o male PMHx Afib on xarelto, HTN, former cigar smoker (was not a cigarette smoker [place] now presenting with jaundice and found to have pancreatic mass concerning for pancreatic cancer with metastases on imaging at outside hospital. Patient and family report that patient had been feeling mildly unwell over the last 2-3 weeks, with feelings of lethargy and generalized achiness and weakness. Patient had also noticed weight loss of 20 pounds over the last few months though he had been attempting to lose weight on purpose so initially he thought it was related to his weight loss attempts. However, he has also had poor appetite for a few months. Over the last 2-3 weeks he began to feel more tired/lethargic and just felt achy. He decided to take a vacation to [facility] with his wife to recuperate, however after going [facility] he began to feel worse over the last 2-3 days with worsening lethargy and weakness. He was then noted to have yellowish eyes and skin by his wife over the last 1-2 days and he was noted to have darker colored urine.     Patient now with Afib on telemetry with HR up to 150s. Patient is asymptomatic, A&O x3, NAD. PM Coreg not given secondary to parameters for BP.       Afib A/P:  >Telemetry with Afib 110-130s  >Vital signs hemodynamically stable with the exception of HR  >Lopressor 5mg IVP x1  >Continue with telemetry  >Continue with Coreg   >Continue with IV hydration  >Consider cardiology consult  >Follow up GI recommendations regarding anticoagulation   >Continue to monitor and observe patient  >Will endorse to primary team in AM        Jovany Miugel PA-C  Dept of Medicine   [phone]

## 2018-08-08 NOTE — PROGRESS NOTE ADULT - ASSESSMENT
Assessment:   74 yo male with obstructive jaundice s/p CT at an outside hospital that showed a pancreatic mass and biliary obstruction. Patient had an ERCP /EUS/ FNA  yesterday with choledochoduodenostomy, metal biliary stent placement for biliary drainage. LFTs mildly improved Patient reports pruritis improved . Post procedure vomiting   On IV heparin for hx of A-fib / recent arrythmia   Plan:    Monitor labs and trends as ordered  Zofran PRN   No GI objection to diet advance once nausea improved   Await results of FNA     [facility]  [address] [place] [address]  [id number] [phone]

## 2018-08-08 NOTE — PROVIDER CONTACT NOTE (OTHER) - BACKGROUND
Pt admitted for pancreatic neoplasm. Pt has hx of AF, currently off AC d/t pt being s/p ERCP. Pt could not take his scheduled Coreg d/t BP below administration parameter.

## 2018-08-08 NOTE — CHART NOTE - NSCHARTNOTEFT_GEN_A_CORE
Patient is a 73y old  Male who presents with a chief complaint of Jaundice, lethargy/weakness (04 Aug 2018 22:20)      Vital Signs Last 24 Hrs  T(C): 37 (07 Aug 2018 23:58), Max: 37.4 (07 Aug 2018 15:49)  T(F): 98.6 (07 Aug 2018 23:58), Max: 99.4 (07 Aug 2018 15:49)  HR: 130 (08 Aug 2018 03:00) (85 - 130)  BP: 92/57 (08 Aug 2018 03:00) (92/57 - 120/87)  BP(mean): 93 (07 Aug 2018 21:20) (74 - 93)  RR: 16 (08 Aug 2018 03:00) (14 - 19)  SpO2: 94% (08 Aug 2018 03:00) (93% - 99%)          Physical Exam:  General: WN/WD, NAD, nontoxic appearing  Neurology: A&Ox3, nonfocal, DENNEY x 4  Head:  Normocephalic, atraumatic  Respiratory: CTA B/L  CV: RRR, S1S2, no murmur  Abdominal: Soft, NT, ND no palpable mass  MSK: No edema, + peripheral pulses, FROM all 4 extremity        Labs:                          13.5   11.46 )-----------( 374      ( 07 Aug 2018 08:16 )             38.0     08-07    137  |  103  |  10  ----------------------------<  133<H>  3.8   |  22  |  0.78    Ca    8.7      07 Aug 2018 06:58    TPro  5.8<L>  /  Alb  3.0<L>  /  TBili  25.2<H>  /  DBili  x   /  AST  67<H>  /  ALT  71<H>  /  AlkPhos  406<H>  08-07                Radiology:        Assessment & Plan:  HPI:  74 y/o male PMHx Afib on xarelto, HTN, former cigar smoker (was not a cigarette smoker except [place] [employer] now presenting with jaundice and found to have pancreatic mass concerning for pancreatic cancer with metastases on imaging at outside hospital. Patient and family report that patient had been feeling mildly unwell over the last 2-3 weeks, with feelings of lethargy and generalized achiness and weakness. Patient had also noticed weight loss of 20 pounds over the last few months though he had been attempting to lose weight on purpose so initially he thought it was related to his weight loss attempts. However, he has also had poor appetite for a few months. Over the last 2-3 weeks he began to feel more tired/lethargic and just felt achy. He decided to take a vacation to [facility] with his wife to recuperate, however after going [facility] he began to feel worse over the last 2-3 days with worsening lethargy and weakness. He was then noted to have yellowish eyes and skin by his wife over the last 1-2 days and he was noted to have darker colored urine. He was urged today by his friends to go to a hospital in [facility] to be seen immediately and there had CT scan performed showing distended biliary ducts and gallbladder as well as pancreatic mass and likely omental metastatic disease. He has now come to [facility] for further care. Patient denies any fevers or chills though he has had poor cold intolerance ever since starting beta blockade that is unchanged. Patient denies nausea or vomiting. Does report itching of skin x 1-2 days but reports that this is mild and tolerable at this time. No dysuria. +light colored loose stools x 1 day. No leg swelling or pain. (04 Aug 2018 22:20)    >  >  >Continue to monitor and observe patient  >Will endorse to primary team in AM      Jovany Miguel PA-C  Dept of Medicine Patient is a 73y old  Male who presents with a chief complaint of Jaundice, lethargy/weakness (04 Aug 2018 22:20)  Notified by RN that patient with BP 92/57 (manual).   Patient seen and assessed at bedside, laying in bed, NAD, A&O x3. Wife at bedside.  Patient denied headache, dizziness, chest pain, shortness of breath, arm pain, jaw pain, nausea, vomiting, or abdominal pain.       Vital Signs Last 24 Hrs  T(C): 37 (07 Aug 2018 23:58), Max: 37.4 (07 Aug 2018 15:49)  T(F): 98.6 (07 Aug 2018 23:58), Max: 99.4 (07 Aug 2018 15:49)  HR: 130 (08 Aug 2018 03:00) (85 - 130)  BP: 92/57 (08 Aug 2018 03:00) (92/57 - 120/87)  BP(mean): 93 (07 Aug 2018 21:20) (74 - 93)  RR: 16 (08 Aug 2018 03:00) (14 - 19)  SpO2: 94% (08 Aug 2018 03:00) (93% - 99%)        Physical Exam:  General: (+) Jaundice, WN/WD, NAD, nontoxic appearing  Neurology: A&Ox3, nonfocal, DENNEY x 4  Head:  Normocephalic, atraumatic  Respiratory: CTA B/L  CV: (+) tachycardic, (+) irregular  Abdominal: Soft, NT, ND no palpable mass  MSK: No edema, + peripheral pulses, FROM all 4 extremity          Labs:                          13.5   11.46 )-----------( 374      ( 07 Aug 2018 08:16 )             38.0     08-07    137  |  103  |  10  ----------------------------<  133<H>  3.8   |  22  |  0.78    Ca    8.7      07 Aug 2018 06:58    TPro  5.8<L>  /  Alb  3.0<L>  /  TBili  25.2<H>  /  DBili  x   /  AST  67<H>  /  ALT  71<H>  /  AlkPhos  406<H>  08-07                  Assessment & Plan:  HPI:  74 y/o male PMHx Afib on xarelto, HTN, former cigar smoker (was not a cigarette smoker [place] now presenting with jaundice and found to have pancreatic mass concerning for pancreatic cancer with metastases on imaging at outside hospital. Patient and family report that patient had been feeling mildly unwell over the last 2-3 weeks, with feelings of lethargy and generalized achiness and weakness. Patient had also noticed weight loss of 20 pounds over the last few months though he had been attempting to lose weight on purpose so initially he thought it was related to his weight loss attempts. However, he has also had poor appetite for a few months. Over the last 2-3 weeks he began to feel more tired/lethargic and just felt achy. He decided to take a vacation to [facility] with his wife to recuperate, however after going [facility] he began to feel worse over the last 2-3 days with worsening lethargy and weakness. He was then noted to have yellowish eyes and skin by his wife over the last 1-2 days and he was noted to have darker colored urine. He was urged today by his friends to go to a hospital in [facility] to be seen immediately and there had CT scan performed showing distended biliary ducts and gallbladder as well as pancreatic mass and likely omental metastatic disease. He has now come to [facility] for further care. Patient denies any fevers or chills though he has had poor cold intolerance ever since starting beta blockade that is unchanged. Patient denies nausea or vomiting. Does report itching of skin x 1-2 days but reports that this is mild and tolerable at this time. No dysuria. +light colored loose stools x 1 day. No leg swelling or pain. (04 Aug 2018 22:20)      Hypotension A/P:  >IVNS 500mL bolus x1  >Continue with IVF hydration   >Continue to monitor and observe patient  >Will endorse to primary team in AM      Jovany Miguel PA-C  Dept of Medicine Patient is a 73y old  Male who presents with a chief complaint of Jaundice, lethargy/weakness (04 Aug 2018 22:20)  Notified by RN that patient with BP 92/57 (manual).   Patient seen and assessed at bedside, laying in bed, NAD, A&O x3. Wife at bedside.  Patient denied headache, dizziness, chest pain, shortness of breath, arm pain, jaw pain, nausea, vomiting, or abdominal pain.       Vital Signs Last 24 Hrs  T(C): 37 (07 Aug 2018 23:58), Max: 37.4 (07 Aug 2018 15:49)  T(F): 98.6 (07 Aug 2018 23:58), Max: 99.4 (07 Aug 2018 15:49)  HR: 130 (08 Aug 2018 03:00) (85 - 130)  BP: 92/57 (08 Aug 2018 03:00) (92/57 - 120/87)  BP(mean): 93 (07 Aug 2018 21:20) (74 - 93)  RR: 16 (08 Aug 2018 03:00) (14 - 19)  SpO2: 94% (08 Aug 2018 03:00) (93% - 99%)    Vital Signs Last 24 Hrs  HR: 130 (08 Aug 2018 03:00) (85 - 130)  BP: 108/67 (08 Aug 2018 03:30) (92/57 - 115/65)  BP(mean): 93 (07 Aug 2018 21:20) (74 - 93)  RR: 16 (08 Aug 2018 03:00) (14 - 19)  SpO2: 94% (08 Aug 2018 03:00) (94% - 99%)        Physical Exam:  General: (+) Jaundice, WN/WD, NAD, nontoxic appearing  Neurology: A&Ox3, nonfocal, DENNEY x 4  Head:  Normocephalic, atraumatic  Respiratory: CTA B/L  CV: (+) tachycardic, (+) irregular  Abdominal: Soft, NT, ND no palpable mass  MSK: No edema, + peripheral pulses, FROM all 4 extremity          Labs:                          13.5   11.46 )-----------( 374      ( 07 Aug 2018 08:16 )             38.0     08-07    137  |  103  |  10  ----------------------------<  133<H>  3.8   |  22  |  0.78    Ca    8.7      07 Aug 2018 06:58    TPro  5.8<L>  /  Alb  3.0<L>  /  TBili  25.2<H>  /  DBili  x   /  AST  67<H>  /  ALT  71<H>  /  AlkPhos  406<H>  08-07                  Assessment & Plan:  HPI:  74 y/o male PMHx Afib on xarelto, HTN, former cigar smoker (was not a cigarette smoker [place] now presenting with jaundice and found to have pancreatic mass concerning for pancreatic cancer with metastases on imaging at outside hospital. Patient and family report that patient had been feeling mildly unwell over the last 2-3 weeks, with feelings of lethargy and generalized achiness and weakness. Patient had also noticed weight loss of 20 pounds over the last few months though he had been attempting to lose weight on purpose so initially he thought it was related to his weight loss attempts. However, he has also had poor appetite for a few months. Over the last 2-3 weeks he began to feel more tired/lethargic and just felt achy. He decided to take a vacation to [facility] with his wife to recuperate, however after going [facility] he began to feel worse over the last 2-3 days with worsening lethargy and weakness. He was then noted to have yellowish eyes and skin by his wife over the last 1-2 days and he was noted to have darker colored urine.     Patient now with episode of hypotension with BP 92/57; he was asymptomatic, NAD, A&O x3. Patient was given IVNS 500mL bolus x1 which resulted in improvement of BP as well as improvement in HR (HR was up to 140s earlier, Afib).      Hypotension A/P:  >?secondary to hypovolemia  >IVNS 500mL bolus x1  >HR improved to 110s post bolus  >Continue with IVF hydration   >Will consider infectious workup if patient continues to be hypotensive  >Follow up AM labs   >Continue to monitor and observe patient  >Will endorse to primary team in AM      Jovany Miguel PA-C  Dept of Medicine  [phone]

## 2018-08-09 LAB
ALBUMIN SERPL ELPH-MCNC: 2.8 G/DL — LOW (ref 3.3–5)
ALP SERPL-CCNC: 308 U/L — HIGH (ref 40–120)
ALT FLD-CCNC: 47 U/L — HIGH (ref 10–45)
ANION GAP SERPL CALC-SCNC: 14 MMOL/L — SIGNIFICANT CHANGE UP (ref 5–17)
APTT BLD: 108.1 SEC — HIGH (ref 27.5–37.4)
APTT BLD: 139.5 SEC — CRITICAL HIGH (ref 27.5–37.4)
APTT BLD: 49.1 SEC — HIGH (ref 27.5–37.4)
AST SERPL-CCNC: 51 U/L — HIGH (ref 10–40)
BILIRUB SERPL-MCNC: 20.9 MG/DL — HIGH (ref 0.2–1.2)
BUN SERPL-MCNC: 18 MG/DL — SIGNIFICANT CHANGE UP (ref 7–23)
CALCIUM SERPL-MCNC: 8.2 MG/DL — LOW (ref 8.4–10.5)
CHLORIDE SERPL-SCNC: 101 MMOL/L — SIGNIFICANT CHANGE UP (ref 96–108)
CO2 SERPL-SCNC: 21 MMOL/L — LOW (ref 22–31)
CREAT SERPL-MCNC: 0.81 MG/DL — SIGNIFICANT CHANGE UP (ref 0.5–1.3)
GLUCOSE SERPL-MCNC: 137 MG/DL — HIGH (ref 70–99)
HCT VFR BLD CALC: 32.8 % — LOW (ref 39–50)
HGB BLD-MCNC: 11.9 G/DL — LOW (ref 13–17)
INR BLD: 1.62 RATIO — HIGH (ref 0.88–1.16)
MCHC RBC-ENTMCNC: 31.9 PG — SIGNIFICANT CHANGE UP (ref 27–34)
MCHC RBC-ENTMCNC: 36.3 GM/DL — HIGH (ref 32–36)
MCV RBC AUTO: 87.9 FL — SIGNIFICANT CHANGE UP (ref 80–100)
NON-GYNECOLOGICAL CYTOLOGY STUDY: SIGNIFICANT CHANGE UP
PLATELET # BLD AUTO: 286 K/UL — SIGNIFICANT CHANGE UP (ref 150–400)
POTASSIUM SERPL-MCNC: 3.7 MMOL/L — SIGNIFICANT CHANGE UP (ref 3.5–5.3)
POTASSIUM SERPL-SCNC: 3.7 MMOL/L — SIGNIFICANT CHANGE UP (ref 3.5–5.3)
PROT SERPL-MCNC: 5.1 G/DL — LOW (ref 6–8.3)
PROTHROM AB SERPL-ACNC: 18.5 SEC — HIGH (ref 10–13.1)
RBC # BLD: 3.73 M/UL — LOW (ref 4.2–5.8)
RBC # FLD: 18.2 % — HIGH (ref 10.3–14.5)
SODIUM SERPL-SCNC: 136 MMOL/L — SIGNIFICANT CHANGE UP (ref 135–145)
WBC # BLD: 11.74 K/UL — HIGH (ref 3.8–10.5)
WBC # FLD AUTO: 11.74 K/UL — HIGH (ref 3.8–10.5)

## 2018-08-09 PROCEDURE — 74241: CPT | Mod: 26

## 2018-08-09 PROCEDURE — 99232 SBSQ HOSP IP/OBS MODERATE 35: CPT | Mod: GC

## 2018-08-09 RX ORDER — SIMETHICONE 80 MG/1
160 TABLET, CHEWABLE ORAL ONCE
Qty: 0 | Refills: 0 | Status: COMPLETED | OUTPATIENT
Start: 2018-08-09 | End: 2018-08-09

## 2018-08-09 RX ORDER — CARVEDILOL PHOSPHATE 80 MG/1
6.25 CAPSULE, EXTENDED RELEASE ORAL EVERY 12 HOURS
Qty: 0 | Refills: 0 | Status: DISCONTINUED | OUTPATIENT
Start: 2018-08-09 | End: 2018-08-11

## 2018-08-09 RX ADMIN — HEPARIN SODIUM 0 UNIT(S)/HR: 5000 INJECTION INTRAVENOUS; SUBCUTANEOUS at 15:24

## 2018-08-09 RX ADMIN — HEPARIN SODIUM 1300 UNIT(S)/HR: 5000 INJECTION INTRAVENOUS; SUBCUTANEOUS at 08:11

## 2018-08-09 RX ADMIN — CARVEDILOL PHOSPHATE 6.25 MILLIGRAM(S): 80 CAPSULE, EXTENDED RELEASE ORAL at 22:07

## 2018-08-09 RX ADMIN — CARVEDILOL PHOSPHATE 6.25 MILLIGRAM(S): 80 CAPSULE, EXTENDED RELEASE ORAL at 13:00

## 2018-08-09 RX ADMIN — HEPARIN SODIUM 1000 UNIT(S)/HR: 5000 INJECTION INTRAVENOUS; SUBCUTANEOUS at 17:03

## 2018-08-09 RX ADMIN — SIMETHICONE 160 MILLIGRAM(S): 80 TABLET, CHEWABLE ORAL at 20:32

## 2018-08-09 RX ADMIN — HEPARIN SODIUM 1500 UNIT(S)/HR: 5000 INJECTION INTRAVENOUS; SUBCUTANEOUS at 00:53

## 2018-08-09 NOTE — PROGRESS NOTE ADULT - ASSESSMENT
· Assessment		  74 y/o male PMHx Afib on xarelto, HTN, former cigar smoker (was not a cigarette smoker except [place] now presenting with jaundice and found to have pancreatic mass concerning for pancreatic cancer with metastases on imaging at outside hospital       Atrial fibrillation with RVR.  Afib w/RVR resolved  Continue carvedilol  Holding xarelto.  On Heparin        HTN (hypertension).  Continue coreg.       Obstructive jaundice due to malignant neoplasm.  s/p. ERCP  Path pending   Follow LFT, CBC     Clears for now. Advance diet in am if tolerating.    Malignant neoplasm of body of pancreas.  Oncology follow Dr. Schneider.  d/w patient/ wife QA  Aditya Frederick MD pager [phone]

## 2018-08-09 NOTE — PROGRESS NOTE ADULT - ASSESSMENT
Impression:  1. Pancreatic mass causing obstruction jaundice with choledochoduodenostomy after failed ERCP  2. N/v- with concern for GOO with compression of pancreatic mass to duodenum    Plan:  1. Please expedite GI series today to evaluate for GOO  2. Trend liver enzymes daily  3. Keep NPO until Gi series  4. if there is evidence of GOO will likely need endoscopic stenting for palliative Impression:  1. Pancreatic mass causing obstruction jaundice with choledochoduodenostomy after failed ERCP  2. N/v- with concern for gastric outlet obstruction (GOO) with compression of pancreatic mass to duodenum    Plan:  1. Please expedite GI series today to evaluate for GOO  2. Trend liver enzymes daily  3. Keep NPO until Gi series  4. if there is evidence of GOO will likely need endoscopic stenting for palliative

## 2018-08-09 NOTE — PROVIDER CONTACT NOTE (CRITICAL VALUE NOTIFICATION) - ACTION/TREATMENT ORDERED:
heparin nomogram protocol followed
BAM Aranda notified, repeat weight done, follow heparin nomogram, pause for 60 minutes and restart at 11  cc/hr, monitor patient, bleeding precautions maintained
NP aware, follow heparin nomogram and monitor for s/s of bleeding
np aware, protocol followed
NP aware, follow heparin nomogram

## 2018-08-09 NOTE — CHART NOTE - NSCHARTNOTEFT_GEN_A_CORE
Gi series reviewed and there is passage of contrast through the duodenum without obstruction. May continue with clears for now

## 2018-08-09 NOTE — PROVIDER CONTACT NOTE (CRITICAL VALUE NOTIFICATION) - ASSESSMENT
Patient a/ox4. no s/s of bleeding noted, hemodynamically stable
VSS. A&Ox4. No s/s of active bleeding
VSS. A&Ox4. No s/s of active bleeding
vss. denies chest pain, sob or palpitations
vss. denies chest pain, sob or palpitations

## 2018-08-09 NOTE — PROVIDER CONTACT NOTE (CRITICAL VALUE NOTIFICATION) - BACKGROUND
Patient admitted for malignant neoplasm of pancreas
patient on a heparin drip @17cc/hr, full nomogram
patient on a heparin gtt, full nomogram at 11cc/hr
pt admitted with jaundice
pt admitted with jaundice

## 2018-08-10 ENCOUNTER — TRANSCRIPTION ENCOUNTER (OUTPATIENT)
Age: 73
End: 2018-08-10

## 2018-08-10 DIAGNOSIS — C25.9 MALIGNANT NEOPLASM OF PANCREAS, UNSPECIFIED: ICD-10-CM

## 2018-08-10 LAB
ANION GAP SERPL CALC-SCNC: 11 MMOL/L — SIGNIFICANT CHANGE UP (ref 5–17)
APTT BLD: 71.3 SEC — HIGH (ref 27.5–37.4)
APTT BLD: 83.9 SEC — HIGH (ref 27.5–37.4)
BUN SERPL-MCNC: 17 MG/DL — SIGNIFICANT CHANGE UP (ref 7–23)
CALCIUM SERPL-MCNC: 8.2 MG/DL — LOW (ref 8.4–10.5)
CHLORIDE SERPL-SCNC: 103 MMOL/L — SIGNIFICANT CHANGE UP (ref 96–108)
CO2 SERPL-SCNC: 24 MMOL/L — SIGNIFICANT CHANGE UP (ref 22–31)
CREAT SERPL-MCNC: 0.74 MG/DL — SIGNIFICANT CHANGE UP (ref 0.5–1.3)
GLUCOSE SERPL-MCNC: 129 MG/DL — HIGH (ref 70–99)
HCT VFR BLD CALC: 33.3 % — LOW (ref 39–50)
HGB BLD-MCNC: 11.9 G/DL — LOW (ref 13–17)
MCHC RBC-ENTMCNC: 31.4 PG — SIGNIFICANT CHANGE UP (ref 27–34)
MCHC RBC-ENTMCNC: 35.7 GM/DL — SIGNIFICANT CHANGE UP (ref 32–36)
MCV RBC AUTO: 87.9 FL — SIGNIFICANT CHANGE UP (ref 80–100)
PLATELET # BLD AUTO: 294 K/UL — SIGNIFICANT CHANGE UP (ref 150–400)
POTASSIUM SERPL-MCNC: 3.7 MMOL/L — SIGNIFICANT CHANGE UP (ref 3.5–5.3)
POTASSIUM SERPL-SCNC: 3.7 MMOL/L — SIGNIFICANT CHANGE UP (ref 3.5–5.3)
RBC # BLD: 3.79 M/UL — LOW (ref 4.2–5.8)
RBC # FLD: 18.1 % — HIGH (ref 10.3–14.5)
SODIUM SERPL-SCNC: 138 MMOL/L — SIGNIFICANT CHANGE UP (ref 135–145)
WBC # BLD: 11.52 K/UL — HIGH (ref 3.8–10.5)
WBC # FLD AUTO: 11.52 K/UL — HIGH (ref 3.8–10.5)

## 2018-08-10 PROCEDURE — 99232 SBSQ HOSP IP/OBS MODERATE 35: CPT | Mod: GC

## 2018-08-10 RX ORDER — SIMETHICONE 80 MG/1
80 TABLET, CHEWABLE ORAL EVERY 6 HOURS
Qty: 0 | Refills: 0 | Status: DISCONTINUED | OUTPATIENT
Start: 2018-08-10 | End: 2018-08-11

## 2018-08-10 RX ORDER — RIVAROXABAN 15 MG-20MG
20 KIT ORAL EVERY 24 HOURS
Qty: 0 | Refills: 0 | Status: DISCONTINUED | OUTPATIENT
Start: 2018-08-10 | End: 2018-08-11

## 2018-08-10 RX ADMIN — HEPARIN SODIUM 1000 UNIT(S)/HR: 5000 INJECTION INTRAVENOUS; SUBCUTANEOUS at 08:57

## 2018-08-10 RX ADMIN — CARVEDILOL PHOSPHATE 6.25 MILLIGRAM(S): 80 CAPSULE, EXTENDED RELEASE ORAL at 06:14

## 2018-08-10 RX ADMIN — HEPARIN SODIUM 1000 UNIT(S)/HR: 5000 INJECTION INTRAVENOUS; SUBCUTANEOUS at 01:41

## 2018-08-10 RX ADMIN — CARVEDILOL PHOSPHATE 6.25 MILLIGRAM(S): 80 CAPSULE, EXTENDED RELEASE ORAL at 18:06

## 2018-08-10 RX ADMIN — RIVAROXABAN 20 MILLIGRAM(S): KIT at 18:06

## 2018-08-10 RX ADMIN — SIMETHICONE 80 MILLIGRAM(S): 80 TABLET, CHEWABLE ORAL at 06:35

## 2018-08-10 NOTE — PROGRESS NOTE ADULT - PROBLEM SELECTOR PLAN 1
Metastatic to liver - confirmed to be adenocarcinoma    Options for therapy discussed.  Pt's bili needs to be decreased in order to start therapy and it appears to be slowly coming down.    Optimally, pt should be enrolled on clinical trial. Clinical trials and different phase trials were explained to pt and family. Explained the Halozyme study to pt and wife.This is a double blind study to evaluate standard therapy with [place]/Abraxane +/- Halozyme (if pt has receptors).      The patient is interested in exploring options at [facility] as the [place] branch is closer to their home. I will provide them with a copy of the clinical trial available in our office and they will decide.    Goal is to get therapy started within next 2 weeks.    Discharge dependent on pt' ability to keep food down.

## 2018-08-10 NOTE — PROGRESS NOTE ADULT - ASSESSMENT
· Assessment		  72 y/o male PMHx Afib on xarelto, HTN, former cigar smoker (was not a cigarette smoker except [place] [employer] now presenting with jaundice and found to have pancreatic mass concerning for pancreatic cancer with metastases on imaging at outside hospital       Atrial fibrillation with RVR.  Afib w/RVR resolved  Continue carvedilol  Restarting AC.        HTN (hypertension).  Continue coreg.       Obstructive jaundice due to malignant neoplasm.  s/p. ERCP    Follow LFT, CBC     Clears for now. Advance diet if tolerating.    Malignant neoplasm of body of pancreas.  Oncology follow Dr. Schneider.    DCP home if tolerating diet, LFT improving and cleared by Gastroenterology.  d/w patient/ wife QA  Aditya Frederick MD pager [phone] · Assessment		  74 y/o male PMHx Afib on xarelto, HTN, former cigar smoker (was not a cigarette smoker except for [place] cigarettes [place] [employer] now presenting with jaundice and found to have pancreatic mass concerning for pancreatic cancer with metastases on imaging at outside hospital       Atrial fibrillation with RVR.  Afib w/RVR resolved  Continue carvedilol  Restarting AC. Refuses AC with Lovenox at this time.       HTN (hypertension).  Continue coreg.       Obstructive jaundice due to malignant neoplasm.  s/p. ERCP    Follow LFT, CBC     Clears for now. Advance diet if tolerating.    Malignant neoplasm of body of pancreas.  Oncology follow Dr. Schneider.    DCP home if tolerating diet, LFT improving and cleared by Gastroenterology.  d/w patient/ wife QA  Aditya Frederick MD pager [phone]

## 2018-08-10 NOTE — DISCHARGE NOTE ADULT - MEDICATION SUMMARY - MEDICATIONS TO TAKE
I will START or STAY ON the medications listed below when I get home from the hospital:    Xarelto 20 mg oral tablet  -- 1 tab(s) by mouth once a day  -- Indication: For Afib/anticoagulation     carvedilol 6.25 mg oral tablet  -- 1 tab(s) by mouth 2 times a day  -- Indication: For HTN (hypertension)    simethicone 80 mg oral tablet, chewable  -- 1 tab(s) by mouth every 6 hours, As needed, Gas  -- Indication: For Gas    pantoprazole 40 mg oral delayed release tablet  -- 1 tab(s) by mouth once a day   -- It is very important that you take or use this exactly as directed.  Do not skip doses or discontinue unless directed by your doctor.  Obtain medical advice before taking any non-prescription drugs as some may affect the action of this medication.  Swallow whole.  Do not crush.    -- Indication: For GERD    Multiple Vitamins oral tablet  -- 1 tab(s) by mouth once a day  -- Indication: For Supplement

## 2018-08-10 NOTE — DISCHARGE NOTE ADULT - PLAN OF CARE
Free from s/s  obstruction Follow up with PMD   Follow up Oncology   Take meds as directed Atrial fibrillation is the most common heart rhythm problem & has the risk of stroke & heart attack  It helps if you control your blood pressure, not drink more than 1-2 alcohol drinks per day, cut down on caffeine, getting treatment for over active thyroid gland, & getting exercise  Call your doctor if you feel your heart racing or beating unusually, chest tightness or pain, lightheaded, faint, shortness of breath especially with exercise  It is important to take your heart medication as prescribed Follow up with GI as directed Follow up with your medical doctor to establish long term blood pressure treatment goals. The patient is interested in exploring options at [facility] as the [place] branch is closer to their home. I will provide them with a copy of the clinical trial available in our office and they will decide.  **Goal is to get therapy started within next 2 weeks. Smoking cessation clinic [phone]

## 2018-08-10 NOTE — DISCHARGE NOTE ADULT - CARE PROVIDER_API CALL
Melissa Torres (MD), Cardiovascular Disease  [address] [place] [address]  Phone: [phone]  Fax: [phone] Melissa Torres (MD), Cardiovascular Disease  [address] [place] [address]  Phone: [phone]  Fax: [phone]    Patria Marr), Hematology; Internal Medicine; Medical Oncology  [address] [place] [address]  Phone: [phone]  Fax: [phone]

## 2018-08-10 NOTE — CHART NOTE - NSCHARTNOTEFT_GEN_A_CORE
Heparin drip dc'd to be resumed per discussion with Gi Dr Sanchez Pt cleared Dr Frederick aware of plans  Medicine NP CARLEE Patel [phone]

## 2018-08-10 NOTE — DISCHARGE NOTE ADULT - CARE PLAN
Principal Discharge DX:	Obstructive jaundice due to malignant neoplasm  Goal:	Free from s/s  obstruction  Assessment and plan of treatment:	Follow up with PMD   Follow up Oncology   Take meds as directed  Secondary Diagnosis:	Atrial fibrillation with RVR  Assessment and plan of treatment:	Atrial fibrillation is the most common heart rhythm problem & has the risk of stroke & heart attack  It helps if you control your blood pressure, not drink more than 1-2 alcohol drinks per day, cut down on caffeine, getting treatment for over active thyroid gland, & getting exercise  Call your doctor if you feel your heart racing or beating unusually, chest tightness or pain, lightheaded, faint, shortness of breath especially with exercise  It is important to take your heart medication as prescribed  Secondary Diagnosis:	Essential hypertension  Secondary Diagnosis:	Pancreatic cancer Principal Discharge DX:	Obstructive jaundice due to malignant neoplasm  Goal:	Free from s/s  obstruction  Assessment and plan of treatment:	Follow up with GI as directed  Secondary Diagnosis:	Atrial fibrillation with RVR  Assessment and plan of treatment:	Atrial fibrillation is the most common heart rhythm problem & has the risk of stroke & heart attack  It helps if you control your blood pressure, not drink more than 1-2 alcohol drinks per day, cut down on caffeine, getting treatment for over active thyroid gland, & getting exercise  Call your doctor if you feel your heart racing or beating unusually, chest tightness or pain, lightheaded, faint, shortness of breath especially with exercise  It is important to take your heart medication as prescribed  Secondary Diagnosis:	Essential hypertension  Assessment and plan of treatment:	Follow up with your medical doctor to establish long term blood pressure treatment goals.  Secondary Diagnosis:	Pancreatic cancer  Assessment and plan of treatment:	The patient is interested in exploring options at [facility] as the [place] branch is closer to their home. I will provide them with a copy of the clinical trial available in our office and they will decide.  **Goal is to get therapy started within next 2 weeks.  Secondary Diagnosis:	Smoker  Assessment and plan of treatment:	Smoking cessation clinic [phone]

## 2018-08-10 NOTE — PROGRESS NOTE ADULT - ASSESSMENT
Impression:   1. Pancreatic mass- biopsy showing adenocarcinoma, causing obstruction jaundice with choledochoduodenostomy after failed ERCP. The biopsy result was relayed to patient.      Plan:  1. Trend liver enzymes  2. Advance diet fulls and if tolerating today may advance to regular soft low residual  3. Oncology evaluation

## 2018-08-10 NOTE — DISCHARGE NOTE ADULT - PATIENT PORTAL LINK FT
You can access the [facility] Patient Portal, offered by [facility], by registering with the following website: [email or web address]

## 2018-08-10 NOTE — DISCHARGE NOTE ADULT - HOSPITAL COURSE
74 y/o male PMHx Afib on xarelto, HTN, former cigar smoker (was not a cigarette smoker [place] now presenting with jaundice and found to have pancreatic mass concerning for pancreatic cancer with metastases on imaging at outside hospital       Atrial fibrillation with RVR.  Afib w/RVR resolved  Continue carvedilol  Holding xarelto.  On Heparin        HTN (hypertension).  Continue coreg.       Obstructive jaundice due to malignant neoplasm.  s/p. ERCP  Path pending   Follow LFT, CBC    Clears    Malignant neoplasm of body of pancreas.  Oncology follow Dr. Schneider.  d/w patient/ wife QA    Pancreatic mass- biopsy showing adenocarcinoma, causing obstruction jaundice with choledochoduodenostomy after failed ERCP. The biopsy result was relayed to patient.    1. Trend liver enzymes  Advance diet fulls and if tolerating today may advance to regular soft low residual  Oncology evaluation  DCP with med rec discussed with Dr Frederick   Follow up with PMD, Dr Schneider, Dr May 72 y/o male PMHx Afib on xarelto, HTN, former cigar smoker (was not a cigarette smoker except for [place] a few cigarettes [place] [employer] now presenting with jaundice and found to have pancreatic mass concerning for pancreatic cancer with metastases on imaging at outside hospital     Jaundice found to have biliary obstruction s/p ERCP and choledochoduodenostomy.Followed by GI and onc. Liver enzymes trending down    Pancreatic mass- biopsy showing adenocarcinoma, causing obstruction jaundice with choledochoduodenostomy after failed ERCP. The biopsy result was relayed to patient.    Atrial fibrillation with RVR.  Afib w/RVR resolved  Continue carvedilol  Holding xarelto.  On Heparin        HTN (hypertension).  Continue coreg.   Obstructive jaundice due to malignant neoplasm.  s/p. ERCP  Path pending   Follow LFT, CBC  Clears diet advanced to regular without problems    Malignant neoplasm of body of pancreas.  Oncology follow Dr. Schneider.    AC resumed for Atrial fibrillation. Pt discharged home to follow up with GI and oncology for the initiation of treatement for adenocarcinoma

## 2018-08-10 NOTE — PROGRESS NOTE ADULT - ATTENDING COMMENTS
Agree with above.  Will defer management to [facility].  Will sign-off.  Pls reconsult PRN.
As above.    Jaundice improving after EUS-guided choledoduodenostomy.  N/v resolved, but pt at risk for duodenal obstruction given stricture.    Recommenations:  Encourage and monitor nutrition.  If patient unable to tolerate sufficient calories with liquid nutritional supplementation, would perform endoscopically placed duodenal stent.
Consider UGI series  May require duodenal stent as well  Clear liquids for now and PPI BID
Enedelia Franco MD, FACP, FACG, AGAF  [facility]  [phone]
Patria Schneider MD  [facility]  [phone]

## 2018-08-11 VITALS
RESPIRATION RATE: 18 BRPM | SYSTOLIC BLOOD PRESSURE: 107 MMHG | HEART RATE: 105 BPM | OXYGEN SATURATION: 95 % | DIASTOLIC BLOOD PRESSURE: 72 MMHG | TEMPERATURE: 98 F

## 2018-08-11 LAB
ALBUMIN SERPL ELPH-MCNC: 2.5 G/DL — LOW (ref 3.3–5)
ALP SERPL-CCNC: 265 U/L — HIGH (ref 40–120)
ALT FLD-CCNC: 43 U/L — SIGNIFICANT CHANGE UP (ref 10–45)
ANION GAP SERPL CALC-SCNC: 12 MMOL/L — SIGNIFICANT CHANGE UP (ref 5–17)
APTT BLD: 32.4 SEC — SIGNIFICANT CHANGE UP (ref 27.5–37.4)
AST SERPL-CCNC: 46 U/L — HIGH (ref 10–40)
BILIRUB DIRECT SERPL-MCNC: 10 MG/DL — HIGH (ref 0–0.2)
BILIRUB INDIRECT FLD-MCNC: 5.6 MG/DL — HIGH (ref 0.2–1)
BILIRUB SERPL-MCNC: 15.6 MG/DL — HIGH (ref 0.2–1.2)
BUN SERPL-MCNC: 15 MG/DL — SIGNIFICANT CHANGE UP (ref 7–23)
CALCIUM SERPL-MCNC: 8.2 MG/DL — LOW (ref 8.4–10.5)
CHLORIDE SERPL-SCNC: 103 MMOL/L — SIGNIFICANT CHANGE UP (ref 96–108)
CO2 SERPL-SCNC: 23 MMOL/L — SIGNIFICANT CHANGE UP (ref 22–31)
CREAT SERPL-MCNC: 0.79 MG/DL — SIGNIFICANT CHANGE UP (ref 0.5–1.3)
GLUCOSE SERPL-MCNC: 124 MG/DL — HIGH (ref 70–99)
HCT VFR BLD CALC: 33.4 % — LOW (ref 39–50)
HGB BLD-MCNC: 11.8 G/DL — LOW (ref 13–17)
MCHC RBC-ENTMCNC: 31.1 PG — SIGNIFICANT CHANGE UP (ref 27–34)
MCHC RBC-ENTMCNC: 35.3 GM/DL — SIGNIFICANT CHANGE UP (ref 32–36)
MCV RBC AUTO: 87.9 FL — SIGNIFICANT CHANGE UP (ref 80–100)
PLATELET # BLD AUTO: 309 K/UL — SIGNIFICANT CHANGE UP (ref 150–400)
POTASSIUM SERPL-MCNC: 3.4 MMOL/L — LOW (ref 3.5–5.3)
POTASSIUM SERPL-SCNC: 3.4 MMOL/L — LOW (ref 3.5–5.3)
PROT SERPL-MCNC: 5.2 G/DL — LOW (ref 6–8.3)
RBC # BLD: 3.8 M/UL — LOW (ref 4.2–5.8)
RBC # FLD: 18.6 % — HIGH (ref 10.3–14.5)
SODIUM SERPL-SCNC: 138 MMOL/L — SIGNIFICANT CHANGE UP (ref 135–145)
WBC # BLD: 11.02 K/UL — HIGH (ref 3.8–10.5)
WBC # FLD AUTO: 11.02 K/UL — HIGH (ref 3.8–10.5)

## 2018-08-11 PROCEDURE — 86301 IMMUNOASSAY TUMOR CA 19-9: CPT

## 2018-08-11 PROCEDURE — 84132 ASSAY OF SERUM POTASSIUM: CPT

## 2018-08-11 PROCEDURE — C1874: CPT

## 2018-08-11 PROCEDURE — 85027 COMPLETE CBC AUTOMATED: CPT

## 2018-08-11 PROCEDURE — 80048 BASIC METABOLIC PNL TOTAL CA: CPT

## 2018-08-11 PROCEDURE — 88173 CYTOPATH EVAL FNA REPORT: CPT

## 2018-08-11 PROCEDURE — C1769: CPT

## 2018-08-11 PROCEDURE — 82330 ASSAY OF CALCIUM: CPT

## 2018-08-11 PROCEDURE — 80076 HEPATIC FUNCTION PANEL: CPT

## 2018-08-11 PROCEDURE — 84443 ASSAY THYROID STIM HORMONE: CPT

## 2018-08-11 PROCEDURE — C1726: CPT

## 2018-08-11 PROCEDURE — 85014 HEMATOCRIT: CPT

## 2018-08-11 PROCEDURE — 71045 X-RAY EXAM CHEST 1 VIEW: CPT

## 2018-08-11 PROCEDURE — 82248 BILIRUBIN DIRECT: CPT

## 2018-08-11 PROCEDURE — 82947 ASSAY GLUCOSE BLOOD QUANT: CPT

## 2018-08-11 PROCEDURE — 93005 ELECTROCARDIOGRAM TRACING: CPT

## 2018-08-11 PROCEDURE — 88305 TISSUE EXAM BY PATHOLOGIST: CPT

## 2018-08-11 PROCEDURE — 81001 URINALYSIS AUTO W/SCOPE: CPT

## 2018-08-11 PROCEDURE — 85730 THROMBOPLASTIN TIME PARTIAL: CPT

## 2018-08-11 PROCEDURE — 88307 TISSUE EXAM BY PATHOLOGIST: CPT

## 2018-08-11 PROCEDURE — 85610 PROTHROMBIN TIME: CPT

## 2018-08-11 PROCEDURE — 82803 BLOOD GASES ANY COMBINATION: CPT

## 2018-08-11 PROCEDURE — 82435 ASSAY OF BLOOD CHLORIDE: CPT

## 2018-08-11 PROCEDURE — 83605 ASSAY OF LACTIC ACID: CPT

## 2018-08-11 PROCEDURE — 96374 THER/PROPH/DIAG INJ IV PUSH: CPT

## 2018-08-11 PROCEDURE — 82247 BILIRUBIN TOTAL: CPT

## 2018-08-11 PROCEDURE — 83690 ASSAY OF LIPASE: CPT

## 2018-08-11 PROCEDURE — 80053 COMPREHEN METABOLIC PANEL: CPT

## 2018-08-11 PROCEDURE — 99285 EMERGENCY DEPT VISIT HI MDM: CPT | Mod: 25

## 2018-08-11 PROCEDURE — 84100 ASSAY OF PHOSPHORUS: CPT

## 2018-08-11 PROCEDURE — 83735 ASSAY OF MAGNESIUM: CPT

## 2018-08-11 PROCEDURE — 74241: CPT

## 2018-08-11 PROCEDURE — 84295 ASSAY OF SERUM SODIUM: CPT

## 2018-08-11 RX ORDER — SIMETHICONE 80 MG/1
1 TABLET, CHEWABLE ORAL
Qty: 0 | Refills: 0 | COMMUNITY
Start: 2018-08-11

## 2018-08-11 RX ORDER — POTASSIUM CHLORIDE 20 MEQ
40 PACKET (EA) ORAL ONCE
Qty: 0 | Refills: 0 | Status: COMPLETED | OUTPATIENT
Start: 2018-08-11 | End: 2018-08-11

## 2018-08-11 RX ORDER — PANTOPRAZOLE SODIUM 20 MG/1
1 TABLET, DELAYED RELEASE ORAL
Qty: 30 | Refills: 0 | OUTPATIENT
Start: 2018-08-11 | End: 2018-09-09

## 2018-08-11 RX ORDER — IBUPROFEN 200 MG
1 TABLET ORAL
Qty: 0 | Refills: 0 | COMMUNITY

## 2018-08-11 RX ADMIN — SIMETHICONE 80 MILLIGRAM(S): 80 TABLET, CHEWABLE ORAL at 01:09

## 2018-08-11 RX ADMIN — Medication 40 MILLIEQUIVALENT(S): at 10:44

## 2018-08-11 RX ADMIN — CARVEDILOL PHOSPHATE 6.25 MILLIGRAM(S): 80 CAPSULE, EXTENDED RELEASE ORAL at 05:07

## 2018-08-11 NOTE — CHART NOTE - NSCHARTNOTEFT_GEN_A_CORE
Patient who presented with jaundice found to have biliary obstruction s/p ERCP and choledochoduodenostomy. Hospitalist would like discharge patient today. Hb and vitals have been stable x 48 hours.    Recommendations  -if patient is able to tolerate PO and no other active issues, can discharge patient as per primary team  - can resume patient's anticoagulation at discharge

## 2018-08-11 NOTE — PROGRESS NOTE ADULT - PROVIDER SPECIALTY LIST ADULT
Gastroenterology
Heme/Onc
Heme/Onc
Internal Medicine
Gastroenterology
Gastroenterology

## 2018-08-11 NOTE — PROGRESS NOTE ADULT - ASSESSMENT
· Assessment		  74 y/o male PMHx Afib on xarelto, HTN, former cigar smoker (was not a cigarette smoker except for [place] a few cigarettes in [place] [employer]) now presenting with jaundice and found to have pancreatic mass concerning for pancreatic cancer with metastases on imaging at outside hospital       Atrial fibrillation with RVR.  Afib w/RVR resolved  Continue carvedilol  Restarting AC. Refuses AC with Lovenox at this time.       HTN (hypertension).  Continue coreg.       Obstructive jaundice due to malignant neoplasm.  s/p. ERCP    Follow LFT, CBC  Path c/w adenocarcinoma pancreas. Findings d/w patient/ family.     diet as tolerated.    Malignant neoplasm of body of pancreas.  Oncology follow.    DC home. LFT improving and cleared by Gastroenterology. Follow with Oncology/ PMD/ Gastroenterology in 1 week.  d/w patient/ wife TAY Frederick MD pager [phone]

## 2018-08-11 NOTE — PROGRESS NOTE ADULT - PROBLEM SELECTOR PLAN 1
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  Pt's bili needs to be decreased in order to start therapy and it appears to be coming down.  patient and family will review options and decide re. where they want to do treatment - daughter in room from [place]  encouraged to consider clinical trial options at clinialtrials.gov and other pancreatic cancer advocacy sites    Optimally, pt should be enrolled on clinical trial. Clinical trials and different phase trials were explained to pt and family. Explained the Halozyme study to pt and wife.This is a double blind study to evaluate standard therapy with [place]/Abraxane +/- Halozyme (if pt has receptors).      The patient is interested in exploring options at [facility] as the [place] branch is closer to their home. I will provide them with a copy of the clinical trial available in our office and they will decide.    Goal is to get therapy started within next 2 weeks.    Discharge dependent on pt' ability to keep food down.

## 2018-08-11 NOTE — PROGRESS NOTE ADULT - SUBJECTIVE AND OBJECTIVE BOX
[facility]      Chief Complaint:  Patient is a 73y old  Male who presents with a chief complaint of Jaundice, lethargy/weakness (04 Aug 2018 22:20)      Interval Events: Pt s/p EUS/FNA/ERCP yesterday with failed biliary stent but able to place choledochoduodenostomy. Pt feels well now with some bilious vomiting overnight. He was also tachycardic overnight as well.    Allergies:  No Known Allergies      Hospital Medications:  aluminum hydroxide/magnesium hydroxide/simethicone Suspension 30 milliLiter(s) Oral every 4 hours PRN  carvedilol 6.25 milliGRAM(s) Oral every 12 hours  hydrOXYzine hydrochloride 25 milliGRAM(s) Oral every 8 hours PRN  lactated ringers. 1000 milliLiter(s) IV Continuous <Continuous>  ondansetron Injectable 4 milliGRAM(s) IV Push every 6 hours PRN      PMHX/PSHX:  HTN (hypertension)  Afib  H/O knee surgery      Family history:  no pertinent history in first degree relative    ROS:         PHYSICAL EXAM:     GENERAL:  No distress  HEENT: conjunctivae clear  CHEST:  Full & symmetric excursion, no increased effort  HEART:  Regular rhythm  ABDOMEN:  Soft, non-tender, non-distended  EXTREMITIES:  no edema  SKIN:  Dry/warm  NEURO:  Alert    Vital Signs:  Vital Signs Last 24 Hrs  T(C): 36.8 (08 Aug 2018 04:46), Max: 37.4 (07 Aug 2018 15:49)  T(F): 98.2 (08 Aug 2018 04:46), Max: 99.4 (07 Aug 2018 15:49)  HR: 92 (08 Aug 2018 04:46) (85 - 130)  BP: 122/78 (08 Aug 2018 04:46) (92/57 - 122/78)  BP(mean): 93 (07 Aug 2018 21:20) (74 - 93)  RR: 18 (08 Aug 2018 04:46) (14 - 19)  SpO2: 94% (08 Aug 2018 04:46) (94% - 99%)  Daily     Daily     LABS:                        12.8   12.01 )-----------( 352      ( 08 Aug 2018 07:50 )             35.2     08-08    137  |  104  |  15  ----------------------------<  129<H>  4.1   |  21<L>  |  0.80    Ca    8.3<L>      08 Aug 2018 06:43    TPro  5.1<L>  /  Alb  2.8<L>  /  TBili  24.2<H>  /  DBili  x   /  AST  61<H>  /  ALT  58<H>  /  AlkPhos  356<H>  08-08    LIVER FUNCTIONS - ( 08 Aug 2018 06:43 )  Alb: 2.8 g/dL / Pro: 5.1 g/dL / ALK PHOS: 356 U/L / ALT: 58 U/L / AST: 61 U/L / GGT: x           PT/INR - ( 07 Aug 2018 06:59 )   PT: 14.0 sec;   INR: 1.28 ratio         PTT - ( 07 Aug 2018 06:59 )  PTT:69.5 sec        Imaging:
INTERVAL HPI/OVERNIGHT EVENTS:  Patient awake in bed, son bedside. Patient reports he vomited after ERCP/ EUS yesterday. He last vomited this morning as he can recall. taking just liquids . He denies any post procedure discomfort       MEDICATIONS  (STANDING):  carvedilol 6.25 milliGRAM(s) Oral every 12 hours  heparin  Infusion.  Unit(s)/Hr (16 mL/Hr) IV Continuous <Continuous>  lactated ringers. 1000 milliLiter(s) (125 mL/Hr) IV Continuous <Continuous>    MEDICATIONS  (PRN):  aluminum hydroxide/magnesium hydroxide/simethicone Suspension 30 milliLiter(s) Oral every 4 hours PRN Dyspepsia  heparin  Injectable 7000 Unit(s) IV Push every 6 hours PRN For aPTT less than 40  heparin  Injectable 3500 Unit(s) IV Push every 6 hours PRN For aPTT between 40 - 57  hydrOXYzine hydrochloride 25 milliGRAM(s) Oral every 8 hours PRN Itching  ondansetron Injectable 4 milliGRAM(s) IV Push every 6 hours PRN Nausea and/or Vomiting      Allergies    No Known Allergies    Intolerances        Review of Systems:    General:  No fevers or chills , intermittent vomiting    ENT:  No sore throat, or dysphagia  CV:  No chest pain  Resp:  No dyspnea, cough, tachypnea, wheezing  GI: intermittant vomiting post procedure ( overall improved)   Skin: jaundice persists         Vital Signs Last 24 Hrs  T(C): 36.8 (08 Aug 2018 04:46), Max: 37.4 (07 Aug 2018 15:49)  T(F): 98.2 (08 Aug 2018 04:46), Max: 99.4 (07 Aug 2018 15:49)  HR: 92 (08 Aug 2018 04:46) (92 - 130)  BP: 122/78 (08 Aug 2018 04:46) (92/57 - 122/78)  BP(mean): 93 (07 Aug 2018 21:20) (74 - 93)  RR: 18 (08 Aug 2018 04:46) (14 - 19)  SpO2: 94% (08 Aug 2018 04:46) (94% - 99%)    PHYSICAL EXAM:    Constitutional: well developed  jaundice male , non toxic and in NAD  Neck:  supple  Respiratory: anterior chest wall clear to auscultationi, no wheezing  Cardiovascular: S1 and S2, RRR  Gastrointestinal: soft, non distended hypoactive +BS   Extremities: No peripheral edema, no  cyanosis  Neurological: A/O x 3, no focal deficits  Psychiatric: Normal mood, normal affect  Skin: + jaundice       LABS:                        12.8   12.01 )-----------( 352      ( 08 Aug 2018 07:50 )             35.2     08-08    137  |  104  |  15  ----------------------------<  129<H>  4.1   |  21<L>  |  0.80    Ca    8.3<L>      08 Aug 2018 06:43    TPro  5.1<L>  /  Alb  2.8<L>  /  TBili  24.2<H>  /  DBili  x   /  AST  61<H>  /  ALT  58<H>  /  AlkPhos  356<H>  08-08    Albumin, Serum: 2.8 g/dL <L> [3.3 - 5.0] (08-08 @ 06:43)  Aspartate Aminotransferase (AST/SGOT): 61 U/L <H> [10 - 40] (08-08 @ 06:43)  Alanine Aminotransferase (ALT/SGPT): 58 U/L <H> [10 - 45] (08-08 @ 06:43)  Albumin, Serum: 3.0 g/dL <L> [3.3 - 5.0] (08-07 @ 06:58)  Aspartate Aminotransferase (AST/SGOT): 67 U/L <H> [10 - 40] (08-07 @ 06:58)  Alanine Aminotransferase (ALT/SGPT): 71 U/L <H> [10 - 45] (08-07 @ 06:58)  Albumin, Serum: 3.0 g/dL <L> [3.3 - 5.0] (08-06 @ 06:27)  Aspartate Aminotransferase (AST/SGOT): 67 U/L <H> [10 - 40] (08-06 @ 06:27)  Alanine Aminotransferase (ALT/SGPT): 68 U/L <H> [10 - 45] (08-06 @ 06:27)  Bilirubin Total, Serum: 24.2 mg/dL (08.08.18 @ 06:43)      PT/INR - ( 08 Aug 2018 08:09 )   PT: 14.8 sec;   INR: 1.30 ratio         PTT - ( 07 Aug 2018 06:59 )  PTT:69.5 sec    LIVER FUNCTIONS - ( 08 Aug 2018 06:43 )  Alb: 2.8 g/dL / Pro: 5.1 g/dL / ALK PHOS: 356 U/L / ALT: 58 U/L / AST: 61 U/L / GGT: x             RADIOLOGY & ADDITIONAL TESTS:  < from: ERCP (08.07.18 @ 17:24) >   - A 3cm x 3cm ill defined lesion was noted in the head of the pancreas with                        evidence of vascular invasion (portal vein and superior mesenteric vein) s/p                        FNA x2 and FNB x2.                       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 Successful direct EUS-guided drainage performed with placement of                        a 10mm x 60mm fully covered metal stent into the bile duct from the duodenal                 bulb forming a choledochoduodenostomy.
[facility]      Chief Complaint:  Patient is a 73y old  Male who presents with a chief complaint of Jaundice, lethargy/weakness (04 Aug 2018 22:20)      Interval Events: Pt had large drinks of water and italian ice and said it did not sit well with him. Some nausea but no vomiting.     Allergies:  No Known Allergies      Hospital Medications:  aluminum hydroxide/magnesium hydroxide/simethicone Suspension 30 milliLiter(s) Oral every 4 hours PRN  carvedilol 6.25 milliGRAM(s) Oral every 12 hours  heparin  Infusion.  Unit(s)/Hr IV Continuous <Continuous>  heparin  Injectable 7000 Unit(s) IV Push every 6 hours PRN  heparin  Injectable 3500 Unit(s) IV Push every 6 hours PRN  hydrOXYzine hydrochloride 25 milliGRAM(s) Oral every 8 hours PRN  lactated ringers. 1000 milliLiter(s) IV Continuous <Continuous>  ondansetron Injectable 4 milliGRAM(s) IV Push every 6 hours PRN      PMHX/PSHX:  HTN (hypertension)  Afib  H/O knee surgery      Family history:  no pertinent history in first degree relative        PHYSICAL EXAM:     GENERAL:  No distress  HEENT: conjunctivae clear  CHEST:  Full & symmetric excursion, no increased effort  HEART:  Regular rhythm  ABDOMEN:  Soft, non-tender, non-distended  EXTREMITIES:  no edema  SKIN:  Dry/warm  NEURO:  Alert    Vital Signs:  Vital Signs Last 24 Hrs  T(C): 37 (09 Aug 2018 04:11), Max: 37 (08 Aug 2018 11:43)  T(F): 98.6 (09 Aug 2018 04:11), Max: 98.6 (08 Aug 2018 11:43)  HR: 80 (09 Aug 2018 04:11) (80 - 105)  BP: 112/71 (09 Aug 2018 04:11) (112/71 - 136/79)  BP(mean): --  RR: 16 (09 Aug 2018 04:11) (16 - 18)  SpO2: 92% (09 Aug 2018 04:11) (92% - 95%)  Daily     Daily     LABS:                        12.9   14.1  )-----------( 326      ( 08 Aug 2018 16:32 )             38.1     08-08    137  |  104  |  15  ----------------------------<  129<H>  4.1   |  21<L>  |  0.80    Ca    8.3<L>      08 Aug 2018 06:43    TPro  5.1<L>  /  Alb  2.8<L>  /  TBili  24.2<H>  /  DBili  x   /  AST  61<H>  /  ALT  58<H>  /  AlkPhos  356<H>  08-08    LIVER FUNCTIONS - ( 08 Aug 2018 06:43 )  Alb: 2.8 g/dL / Pro: 5.1 g/dL / ALK PHOS: 356 U/L / ALT: 58 U/L / AST: 61 U/L / GGT: x           PT/INR - ( 08 Aug 2018 08:09 )   PT: 14.8 sec;   INR: 1.30 ratio         PTT - ( 09 Aug 2018 00:05 )  PTT:49.1 sec        Imaging:    < from: ERCP (08.07.18 @ 17:24) >      Subsequently, the tract was further dilated using a 4mm x 4mm Hurricane balloon. Then a 10mm        x 60mm fully covered metal stent was placed into the bile duct from the duodenal bulb under        endoscopic and fluoroscopic guidance forming a choledochoduodenostomy. There were copious        amounts of bile draining from the stent. Fluoroscopy confirmed excellent position.                                                                                                        Impression:          - A 3cm x 3cm ill defined lesion was noted in the head of the pancreas with                        evidence of vascular invasion (portal vein and superior mesenteric vein) s/p                        FNA x2 and FNB x2.                       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 Successful direct EUS-guided drainage performed with placement of                        a 10mm x 60mm fully covered metal stent into the bile duct from the duodenal                 bulb forming a choledochoduodenostomy.  Recommendation:      - Return patient to [facility] for ongoing care.                       - NPO today.                       - IV hydration with LR.                       - Monitor LFTs.                   - Hold heparin today.                       - Follow-up biopsies                       - Oncology recommendations.                                                                                                          < end of copied text >
Chief Complaint:  Patient is a 73y old  Male who presents with a chief complaint of Jaundice, lethargy/weakness (10 Aug 2018 07:12)      Interval Events: Pt feeling well. Tolerating diet. Had GI series which was unremarkable.    Allergies:  No Known Allergies      Hospital Medications:  aluminum hydroxide/magnesium hydroxide/simethicone Suspension 30 milliLiter(s) Oral every 4 hours PRN  carvedilol 6.25 milliGRAM(s) Oral every 12 hours  heparin  Infusion.  Unit(s)/Hr IV Continuous <Continuous>  heparin  Injectable 7000 Unit(s) IV Push every 6 hours PRN  heparin  Injectable 3500 Unit(s) IV Push every 6 hours PRN  hydrOXYzine hydrochloride 25 milliGRAM(s) Oral every 8 hours PRN  ondansetron Injectable 4 milliGRAM(s) IV Push every 6 hours PRN  simethicone 80 milliGRAM(s) Chew every 6 hours PRN      PMHX/PSHX:  HTN (hypertension)  Afib  H/O knee surgery      Family history:  no pertinent history in first degree relative    ROS:     General:  No fevers, chills  Eyes:  Good vision  ENT:  No odynophagia, dysphagia  CV:  No pain, palpitations  Resp:  No dyspnea, cough, tachypnea, wheezing  GI:  See HPI  Muscle:  No pain, weakness  Skin:  No rash, edema      PHYSICAL EXAM:     GENERAL:  No distress  HEENT: conjunctivae clear  CHEST:  Full & symmetric excursion, no increased effort  HEART:  Regular rhythm  ABDOMEN:  Soft, non-tender, non-distended  EXTREMITIES:  no edema  SKIN:  Dry/warm  NEURO:  Alert    Vital Signs:  Vital Signs Last 24 Hrs  T(C): 36.7 (10 Aug 2018 03:59), Max: 36.8 (09 Aug 2018 13:50)  T(F): 98.1 (10 Aug 2018 03:59), Max: 98.2 (09 Aug 2018 13:50)  HR: 93 (10 Aug 2018 03:59) (93 - 101)  BP: 111/76 (10 Aug 2018 03:59) (110/74 - 118/72)  BP(mean): --  RR: 18 (10 Aug 2018 03:59) (18 - 18)  SpO2: 95% (10 Aug 2018 03:59) (93% - 95%)  Daily     Daily     LABS:                        11.9   11.74 )-----------( 286      ( 09 Aug 2018 08:36 )             32.8     08-09    136  |  101  |  18  ----------------------------<  137<H>  3.7   |  21<L>  |  0.81    Ca    8.2<L>      09 Aug 2018 07:11    TPro  5.1<L>  /  Alb  2.8<L>  /  TBili  20.9<H>  /  DBili  x   /  AST  51<H>  /  ALT  47<H>  /  AlkPhos  308<H>  08-09    LIVER FUNCTIONS - ( 09 Aug 2018 07:11 )  Alb: 2.8 g/dL / Pro: 5.1 g/dL / ALK PHOS: 308 U/L / ALT: 47 U/L / AST: 51 U/L / GGT: x           PT/INR - ( 09 Aug 2018 08:29 )   PT: 18.5 sec;   INR: 1.62 ratio         PTT - ( 10 Aug 2018 00:10 )  PTT:83.9 sec        Imaging:  < from: Xray Upper GI Series (08.09.18 @ 11:40) >  CLINICAL INFORMATION: Pancreatic mass with compression to duodenum, and   nausea, evaluate for gastric outlet obstruction    TECHNIQUE:  Single-contrast upper GI and small bowel follow-through were   performed.  FLUOROSCOPY: The exam was performed with a low dose, pulsed fluoroscopy   unit.  Time: 48 seconds     FINDINGS:     A single  view of the abdomen demonstrates a normal bowel gas   pattern. Biliary stent is observed in the right upper quadrant.     The patient was given liquid barium contrast and fluoroscopic spot images   were obtained.  The esophagus was normal in appearance. Contrast passed   unimpeded through the gastroesophageal junction into a normal-appearing   stomach. No gastric outlet obstruction was appreciated.     IMPRESSION:     Contrast passed unimpeded through the gastroesophageal junction. No   gastric outlet obstruction was appreciated.              < end of copied text >
INTERVAL HPI / OVERNIGHT EVENTS:  Pt without new c/o.    MEDICATIONS  (STANDING):  carvedilol 6.25 milliGRAM(s) Oral every 12 hours  heparin  Infusion. 1100 Unit(s)/Hr (11 mL/Hr) IV Continuous <Continuous>  sodium chloride 0.9%. 1000 milliLiter(s) (80 mL/Hr) IV Continuous <Continuous>    MEDICATIONS  (PRN):  aluminum hydroxide/magnesium hydroxide/simethicone Suspension 30 milliLiter(s) Oral every 4 hours PRN Dyspepsia  heparin  Injectable 7500 Unit(s) IV Push every 6 hours PRN For aPTT less than 40  heparin  Injectable 3500 Unit(s) IV Push every 6 hours PRN For aPTT between 40 - 57  hydrOXYzine hydrochloride 25 milliGRAM(s) Oral every 8 hours PRN Itching  ondansetron Injectable 4 milliGRAM(s) IV Push every 6 hours PRN Nausea and/or Vomiting      Allergies    No Known Allergies    Intolerances      Review of Systems:    General:  No wt loss, fevers, chills, night sweats, fatigue  ENT:  No sore throat, pain, runny nose, dysphagia  CV:  No chest pain, palpitations, BOLES  Resp:  No dyspnea, cough, tachypnea, wheezing  Neuro:  No focal weakness, headache, vision changes  Heme:  No petechiae, ecchymosis, easy bruisability      Vital Signs Last 24 Hrs  T(C): 36.9 (06 Aug 2018 04:31), Max: 36.9 (06 Aug 2018 04:31)  T(F): 98.5 (06 Aug 2018 04:31), Max: 98.5 (06 Aug 2018 04:31)  HR: 100 (06 Aug 2018 04:31) (95 - 100)  BP: 133/71 (06 Aug 2018 04:31) (105/68 - 133/71)  BP(mean): --  RR: 16 (06 Aug 2018 04:31) (16 - 17)  SpO2: 93% (06 Aug 2018 04:31) (93% - 96%)    PHYSICAL EXAM:    Constitutional: NAD, jaundiced male  Neck: No LAD, supple  Respiratory: clear to auscultation b/l no rales, rhonchi, wheezing  Cardiovascular: S1 and S2, RRR, no murmur  Gastrointestinal: +BS x4, soft, NT/ND, neg HSM,  Extremities: No peripheral edema, neg clubbing, cyanosis  Vascular: 2+ peripheral pulses  Neurological: A/O x 3, no focal deficits  Psychiatric: Normal mood, normal affect  Skin: No rashes, anicteric      LABS:                        12.0   8.16  )-----------( 299      ( 06 Aug 2018 07:42 )             33.4     08-06    138  |  104  |  11  ----------------------------<  122<H>  3.8   |  20<L>  |  0.73    Ca    8.3<L>      06 Aug 2018 06:27  Phos  3.1     08-05  Mg     1.8     08-05    TPro  5.3<L>  /  Alb  3.0<L>  /  TBili  20.3<H>  /  DBili  >10.0<H>  /  AST  67<H>  /  ALT  68<H>  /  AlkPhos  364<H>  08-06    PT/INR - ( 04 Aug 2018 21:57 )   PT: 12.9 sec;   INR: 1.18 ratio         PTT - ( 06 Aug 2018 02:13 )  PTT:155.1 sec  Urinalysis Basic - ( 04 Aug 2018 23:44 )    Color: Brown / Appearance: SL Turbid / SG: >1.030 / pH: x  Gluc: x / Ketone: Negative  / Bili: Large / Urobili: Negative   Blood: x / Protein: Trace / Nitrite: Negative   Leuk Esterase: Negative / RBC: 0-2 /HPF / WBC 6-10 /HPF   Sq Epi: x / Non Sq Epi: OCC /HPF / Bacteria: Few /HPF      LIVER FUNCTIONS - ( 06 Aug 2018 06:27 )  Alb: 3.0 g/dL / Pro: 5.3 g/dL / ALK PHOS: 364 U/L / ALT: 68 U/L / AST: 67 U/L / GGT: x
INTERVAL HPI / OVERNIGHT EVENTS:  Pt without new c/o.    MEDICATIONS  (STANDING):  carvedilol 6.25 milliGRAM(s) Oral every 12 hours  heparin  Infusion. 1100 Unit(s)/Hr (11 mL/Hr) IV Continuous <Continuous>  sodium chloride 0.9%. 1000 milliLiter(s) (80 mL/Hr) IV Continuous <Continuous>    MEDICATIONS  (PRN):  aluminum hydroxide/magnesium hydroxide/simethicone Suspension 30 milliLiter(s) Oral every 4 hours PRN Dyspepsia  heparin  Injectable 7500 Unit(s) IV Push every 6 hours PRN For aPTT less than 40  heparin  Injectable 3500 Unit(s) IV Push every 6 hours PRN For aPTT between 40 - 57  hydrOXYzine hydrochloride 25 milliGRAM(s) Oral every 8 hours PRN Itching  ondansetron Injectable 4 milliGRAM(s) IV Push every 6 hours PRN Nausea and/or Vomiting    Allergies  No Known Allergies    Review of Systems:  General:  No wt loss, fevers, chills, night sweats, fatigue  ENT:  No sore throat, pain, runny nose, dysphagia  CV:  No chest pain, palpitations, BOLES  Resp:  No dyspnea, cough, tachypnea, wheezing  Neuro:  No focal weakness, headache, vision changes  Heme:  No petechiae, ecchymosis, easy bruisability    T(F): 98.4 (08-07-18 @ 04:15), Max: 98.8 (08-06-18 @ 11:50)  HR: 106 (08-07-18 @ 04:15) (56 - 106)  BP: 120/87 (08-07-18 @ 04:15) (112/74 - 132/65)  RR: 17 (08-07-18 @ 04:15) (16 - 18)  SpO2: 93% (08-07-18 @ 04:15) (93% - 98%)  Wt(kg): --  ,   I&O's Summary    06 Aug 2018 07:01  -  07 Aug 2018 07:00  --------------------------------------------------------  IN: 240 mL / OUT: 0 mL / NET: 240 mL    PHYSICAL EXAM:  Constitutional: NAD, jaundiced male  Neck: No LAD, supple  Respiratory: clear to auscultation b/l no rales, rhonchi, wheezing  Cardiovascular: S1 and S2, RRR, no murmur  Gastrointestinal: +BS x4, soft, NT/ND, neg HSM,  Extremities: No peripheral edema, neg clubbing, cyanosis  Vascular: 2+ peripheral pulses  Neurological: A/O x 3, no focal deficits  Psychiatric: Normal mood, normal affect  Skin: No rashes, anicteric    LABS:                      13.5   11.46 )-----------( 374      ( 07 Aug 2018 08:16 )             38.0               08-07    137  |  103  |  10  ----------------------------<  133<H>  3.8   |  22  |  0.78    Ca    8.7      07 Aug 2018 06:58    TPro  5.8<L>  /  Alb  3.0<L>  /  TBili  25.2<H>  /  DBili  x   /  AST  67<H>  /  ALT  71<H>  /  AlkPhos  406<H>  08-07    PT/INR - ( 07 Aug 2018 06:59 )   PT: 14.0 sec;   INR: 1.28 ratio    PTT - ( 07 Aug 2018 06:59 )  PTT:69.5 sec
Patient is a 73y old  Male who presents with a chief complaint of Jaundice, lethargy/weakness (04 Aug 2018 22:20)      SUBJECTIVE / OVERNIGHT EVENTS: Comfortable without new complaints. Family at bedside.   Review of Systems  chest pain no  palpitations no  sob no  nausea no  headache no    MEDICATIONS  (STANDING):  carvedilol 6.25 milliGRAM(s) Oral every 12 hours  heparin  Infusion.  Unit(s)/Hr (16 mL/Hr) IV Continuous <Continuous>    MEDICATIONS  (PRN):  aluminum hydroxide/magnesium hydroxide/simethicone Suspension 30 milliLiter(s) Oral every 4 hours PRN Dyspepsia  heparin  Injectable 7000 Unit(s) IV Push every 6 hours PRN For aPTT less than 40  heparin  Injectable 3500 Unit(s) IV Push every 6 hours PRN For aPTT between 40 - 57  hydrOXYzine hydrochloride 25 milliGRAM(s) Oral every 8 hours PRN Itching  ondansetron Injectable 4 milliGRAM(s) IV Push every 6 hours PRN Nausea and/or Vomiting      Vital Signs Last 24 Hrs  T(C): 37 (09 Aug 2018 04:11), Max: 37 (09 Aug 2018 04:11)  T(F): 98.6 (09 Aug 2018 04:11), Max: 98.6 (09 Aug 2018 04:11)  HR: 96 (09 Aug 2018 08:15) (80 - 96)  BP: 115/77 (09 Aug 2018 08:15) (112/71 - 136/79)  BP(mean): --  RR: 16 (09 Aug 2018 04:11) (16 - 17)  SpO2: 92% (09 Aug 2018 04:11) (92% - 95%)    PHYSICAL EXAM:  GENERAL: NAD, well-developed  HEAD:  Atraumatic, Normocephalic  EYES: EOMI, PERRLA, conjunctiva and sclera clear  NECK: Supple, No JVD  CHEST/LUNG: Clear to auscultation bilaterally; No wheeze  HEART: Regular rate and rhythm; No murmurs, rubs, or gallops  ABDOMEN: Soft, Nontender, Nondistended; Bowel sounds present  EXTREMITIES:  2+ Peripheral Pulses, No clubbing, cyanosis, or edema  PSYCH: AAOx3  NEUROLOGY: non-focal  SKIN: No rashes or lesions    LABS:                        11.9   11.74 )-----------( 286      ( 09 Aug 2018 08:36 )             32.8     08-09    136  |  101  |  18  ----------------------------<  137<H>  3.7   |  21<L>  |  0.81    Ca    8.2<L>      09 Aug 2018 07:11    TPro  5.1<L>  /  Alb  2.8<L>  /  TBili  20.9<H>  /  DBili  x   /  AST  51<H>  /  ALT  47<H>  /  AlkPhos  308<H>  08-09    PT/INR - ( 09 Aug 2018 08:29 )   PT: 18.5 sec;   INR: 1.62 ratio         PTT - ( 09 Aug 2018 14:15 )  PTT:139.5 sec            RADIOLOGY & ADDITIONAL TESTS:    Imaging Personally Reviewed:  < from: Xray Upper GI Series (08.09.18 @ 11:40) >  PRESSION:     Contrast passed unimpeded through the gastroesophageal junction. No   gastric outlet obstruction was appreciated.      < end of copied text >    Consultant(s) Notes Reviewed:      Care Discussed with Consultants/Other Providers:
Patient is a 73y old  Male who presents with a chief complaint of Jaundice, lethargy/weakness (04 Aug 2018 22:20)      SUBJECTIVE / OVERNIGHT EVENTS: Comfortable without new complaints. Vomited once. Son at bedside.  Review of Systems  chest pain no  palpitations no  sob no  nausea no  headache no    MEDICATIONS  (STANDING):  carvedilol 6.25 milliGRAM(s) Oral every 12 hours  heparin  Infusion.  Unit(s)/Hr (16 mL/Hr) IV Continuous <Continuous>  lactated ringers. 1000 milliLiter(s) (125 mL/Hr) IV Continuous <Continuous>    MEDICATIONS  (PRN):  aluminum hydroxide/magnesium hydroxide/simethicone Suspension 30 milliLiter(s) Oral every 4 hours PRN Dyspepsia  heparin  Injectable 7000 Unit(s) IV Push every 6 hours PRN For aPTT less than 40  heparin  Injectable 3500 Unit(s) IV Push every 6 hours PRN For aPTT between 40 - 57  hydrOXYzine hydrochloride 25 milliGRAM(s) Oral every 8 hours PRN Itching  ondansetron Injectable 4 milliGRAM(s) IV Push every 6 hours PRN Nausea and/or Vomiting      Vital Signs Last 24 Hrs  T(C): 37 (08 Aug 2018 11:43), Max: 37.4 (07 Aug 2018 15:49)  T(F): 98.6 (08 Aug 2018 11:43), Max: 99.4 (07 Aug 2018 15:49)  HR: 105 (08 Aug 2018 11:43) (92 - 130)  BP: 120/67 (08 Aug 2018 11:43) (92/57 - 122/78)  BP(mean): 93 (07 Aug 2018 21:20) (74 - 93)  RR: 18 (08 Aug 2018 11:43) (14 - 19)  SpO2: 93% (08 Aug 2018 11:43) (93% - 99%)    PHYSICAL EXAM:  GENERAL: NAD, well-developed  HEAD:  Atraumatic, Normocephalic  EYES: EOMI, PERRLA, conjunctiva jaundiced  NECK: Supple, No JVD  CHEST/LUNG: Clear to auscultation bilaterally; No wheeze  HEART: Regular rate and rhythm; No murmurs, rubs, or gallops  ABDOMEN: Soft, Nontender, Nondistended; Bowel sounds present   EXTREMITIES:  2+ Peripheral Pulses, No clubbing, cyanosis, or edema  PSYCH: AAOx3  NEUROLOGY: non-focal  SKIN: No rashes or lesions    LABS:                        12.8   12.01 )-----------( 352      ( 08 Aug 2018 07:50 )             35.2     08-08    137  |  104  |  15  ----------------------------<  129<H>  4.1   |  21<L>  |  0.80    Ca    8.3<L>      08 Aug 2018 06:43    TPro  5.1<L>  /  Alb  2.8<L>  /  TBili  24.2<H>  /  DBili  x   /  AST  61<H>  /  ALT  58<H>  /  AlkPhos  356<H>  08-08    PT/INR - ( 08 Aug 2018 08:09 )   PT: 14.8 sec;   INR: 1.30 ratio         PTT - ( 07 Aug 2018 06:59 )  PTT:69.5 sec      < from: ERCP (08.07.18 @ 17:24) >  mpression:          - A 3cm x 3cm ill defined lesion was noted in the head of the pancreas with                        evidence of vascular invasion (portal vein and superior mesenteric vein) s/p                        FNA x2 and FNB x2.                       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 Successful direct EUS-guided drainage performed with placement of                        a 10mm x 60mm fully covered metal stent into the bile duct from the duodenal                 bulb forming a choledochoduodenostomy.    < end of copied text >        RADIOLOGY & ADDITIONAL TESTS:    Imaging Personally Reviewed:    Consultant(s) Notes Reviewed:      Care Discussed with Consultants/Other Providers:
Patient is a 73y old  Male who presents with a chief complaint of Jaundice, lethargy/weakness (04 Aug 2018 22:20)      SUBJECTIVE / OVERNIGHT EVENTS: Comfortable without new complaints. Wife at bedside.  Review of Systems  chest pain no  palpitations no  sob no  nausea no  headache no    MEDICATIONS  (STANDING):  carvedilol 6.25 milliGRAM(s) Oral every 12 hours  heparin  Infusion. 1100 Unit(s)/Hr (11 mL/Hr) IV Continuous <Continuous>  sodium chloride 0.9%. 1000 milliLiter(s) (80 mL/Hr) IV Continuous <Continuous>    MEDICATIONS  (PRN):  aluminum hydroxide/magnesium hydroxide/simethicone Suspension 30 milliLiter(s) Oral every 4 hours PRN Dyspepsia  heparin  Injectable 7500 Unit(s) IV Push every 6 hours PRN For aPTT less than 40  heparin  Injectable 3500 Unit(s) IV Push every 6 hours PRN For aPTT between 40 - 57  hydrOXYzine hydrochloride 25 milliGRAM(s) Oral every 8 hours PRN Itching  ondansetron Injectable 4 milliGRAM(s) IV Push every 6 hours PRN Nausea and/or Vomiting      Vital Signs Last 24 Hrs  T(C): 37.4 (07 Aug 2018 15:49), Max: 37.4 (07 Aug 2018 15:49)  T(F): 99.4 (07 Aug 2018 15:49), Max: 99.4 (07 Aug 2018 15:49)  HR: 102 (07 Aug 2018 15:49) (85 - 106)  BP: 105/71 (07 Aug 2018 15:49) (105/71 - 120/87)  BP(mean): --  RR: 18 (07 Aug 2018 15:49) (16 - 18)  SpO2: 95% (07 Aug 2018 15:49) (93% - 98%)    PHYSICAL EXAM:  GENERAL: NAD, well-developed  HEAD:  Atraumatic, Normocephalic  EYES: EOMI, PERRLA, conjunctiva and sclera clear  NECK: Supple, No JVD  CHEST/LUNG: Clear to auscultation bilaterally; No wheeze  HEART: Regular rate and rhythm; No murmurs, rubs, or gallops  ABDOMEN: Soft, Nontender, Nondistended; Bowel sounds present  EXTREMITIES:  2+ Peripheral Pulses, No clubbing, cyanosis, or edema  PSYCH: AAOx3  NEUROLOGY: non-focal  SKIN: No rashes or lesions    LABS:                        13.5   11.46 )-----------( 374      ( 07 Aug 2018 08:16 )             38.0     08-07    137  |  103  |  10  ----------------------------<  133<H>  3.8   |  22  |  0.78    Ca    8.7      07 Aug 2018 06:58    TPro  5.8<L>  /  Alb  3.0<L>  /  TBili  25.2<H>  /  DBili  x   /  AST  67<H>  /  ALT  71<H>  /  AlkPhos  406<H>  08-07    PT/INR - ( 07 Aug 2018 06:59 )   PT: 14.0 sec;   INR: 1.28 ratio         PTT - ( 07 Aug 2018 06:59 )  PTT:69.5 sec            RADIOLOGY & ADDITIONAL TESTS:    Imaging Personally Reviewed:    Consultant(s) Notes Reviewed:      Care Discussed with Consultants/Other Providers:
Patient is a 73y old  Male who presents with a chief complaint of Jaundice, lethargy/weakness (04 Aug 2018 22:20)      SUBJECTIVE / OVERNIGHT EVENTS: No new complaints. Tired. Family at bedside.   Review of Systems  chest pain no  palpitations no  sob no  nausea no  headache no    MEDICATIONS  (STANDING):  carvedilol 6.25 milliGRAM(s) Oral every 12 hours  heparin  Infusion. 1100 Unit(s)/Hr (11 mL/Hr) IV Continuous <Continuous>  sodium chloride 0.9%. 1000 milliLiter(s) (80 mL/Hr) IV Continuous <Continuous>    MEDICATIONS  (PRN):  aluminum hydroxide/magnesium hydroxide/simethicone Suspension 30 milliLiter(s) Oral every 4 hours PRN Dyspepsia  heparin  Injectable 7500 Unit(s) IV Push every 6 hours PRN For aPTT less than 40  heparin  Injectable 3500 Unit(s) IV Push every 6 hours PRN For aPTT between 40 - 57  hydrOXYzine hydrochloride 25 milliGRAM(s) Oral every 8 hours PRN Itching  ondansetron Injectable 4 milliGRAM(s) IV Push every 6 hours PRN Nausea and/or Vomiting      Vital Signs Last 24 Hrs  T(C): 37.1 (06 Aug 2018 11:50), Max: 37.1 (06 Aug 2018 11:50)  T(F): 98.8 (06 Aug 2018 11:50), Max: 98.8 (06 Aug 2018 11:50)  HR: 92 (06 Aug 2018 17:30) (56 - 100)  BP: 128/73 (06 Aug 2018 17:30) (114/72 - 133/71)  BP(mean): --  RR: 18 (06 Aug 2018 11:50) (16 - 18)  SpO2: 97% (06 Aug 2018 11:50) (93% - 97%)    PHYSICAL EXAM:  GENERAL: NAD, well-developed  HEAD:  Atraumatic, Normocephalic  EYES: EOMI, PERRLA, conjunctiva jauniced  NECK: Supple, No JVD  CHEST/LUNG: Clear to auscultation bilaterally; No wheeze  HEART: Regular rate and rhythm; No murmurs, rubs, or gallops  ABDOMEN: Soft, Nontender, Nondistended; Bowel sounds present  EXTREMITIES:  2+ Peripheral Pulses, No clubbing, cyanosis, or edema  PSYCH: AAOx3  NEUROLOGY: non-focal  SKIN: No rashes or lesions    LABS:                        13.0   8.5   )-----------( 299      ( 06 Aug 2018 11:16 )             39.0     08-06    138  |  104  |  11  ----------------------------<  122<H>  3.8   |  20<L>  |  0.73    Ca    8.3<L>      06 Aug 2018 06:27  Phos  3.1     08-05  Mg     1.8     08-05    TPro  5.3<L>  /  Alb  3.0<L>  /  TBili  20.3<H>  /  DBili  >10.0<H>  /  AST  67<H>  /  ALT  68<H>  /  AlkPhos  364<H>  08-06    PT/INR - ( 04 Aug 2018 21:57 )   PT: 12.9 sec;   INR: 1.18 ratio         PTT - ( 06 Aug 2018 17:36 )  PTT:123.8 sec      Urinalysis Basic - ( 04 Aug 2018 23:44 )    Color: Brown / Appearance: SL Turbid / SG: >1.030 / pH: x  Gluc: x / Ketone: Negative  / Bili: Large / Urobili: Negative   Blood: x / Protein: Trace / Nitrite: Negative   Leuk Esterase: Negative / RBC: 0-2 /HPF / WBC 6-10 /HPF   Sq Epi: x / Non Sq Epi: OCC /HPF / Bacteria: Few /HPF          RADIOLOGY & ADDITIONAL TESTS:    Imaging Personally Reviewed:    Consultant(s) Notes Reviewed:      Care Discussed with Consultants/Other Providers:
Patient is a 73y old  Male who presents with a chief complaint of Jaundice, lethargy/weakness (04 Aug 2018 22:20)    Admitted overnight by hospitalist service   SUBJECTIVE / OVERNIGHT EVENTS: Comfortable without new complaints. Family at bedside.   Review of Systems  chest pain no  palpitations no  sob no  nausea no  headache no    MEDICATIONS  (STANDING):  carvedilol 6.25 milliGRAM(s) Oral every 12 hours  heparin  Infusion.  Unit(s)/Hr (17 mL/Hr) IV Continuous <Continuous>  sodium chloride 0.9%. 1000 milliLiter(s) (80 mL/Hr) IV Continuous <Continuous>    MEDICATIONS  (PRN):  heparin  Injectable 7500 Unit(s) IV Push every 6 hours PRN For aPTT less than 40  heparin  Injectable 3500 Unit(s) IV Push every 6 hours PRN For aPTT between 40 - 57  hydrOXYzine hydrochloride 25 milliGRAM(s) Oral every 8 hours PRN Itching  ondansetron Injectable 4 milliGRAM(s) IV Push every 6 hours PRN Nausea and/or Vomiting      Vital Signs Last 24 Hrs  T(C): 36.6 (05 Aug 2018 11:33), Max: 37.1 (05 Aug 2018 00:28)  T(F): 97.9 (05 Aug 2018 11:33), Max: 98.8 (05 Aug 2018 00:28)  HR: 95 (05 Aug 2018 11:33) (94 - 110)  BP: 105/68 (05 Aug 2018 11:33) (105/68 - 129/82)  BP(mean): --  RR: 17 (05 Aug 2018 11:33) (16 - 20)  SpO2: 93% (05 Aug 2018 11:33) (93% - 98%)    PHYSICAL EXAM:  GENERAL: NAD, well-developed  HEAD:  Atraumatic, Normocephalic  EYES: EOMI, PERRLA, conjunctiva and sclera clear  NECK: Supple, No JVD  CHEST/LUNG: Clear to auscultation bilaterally; No wheeze  HEART: irregular rate and rhythm; No murmurs, rubs, or gallops  ABDOMEN: Soft, Nontender, Nondistended; Bowel sounds present  EXTREMITIES:  2+ Peripheral Pulses, No clubbing, cyanosis, or edema  PSYCH: AAOx3  NEUROLOGY: non-focal  SKIN: No rashes or lesions    LABS:                        12.0   9.95  )-----------( 308      ( 05 Aug 2018 08:33 )             34.5     08-05    134<L>  |  104  |  17  ----------------------------<  116<H>  3.9   |  18<L>  |  0.84    Ca    8.5      05 Aug 2018 06:14  Phos  3.1     08-05  Mg     1.8     08-05    TPro  5.5<L>  /  Alb  3.1<L>  /  TBili  18.5<H>  /  DBili  x   /  AST  64<H>  /  ALT  75<H>  /  AlkPhos  385<H>  08-05    PT/INR - ( 04 Aug 2018 21:57 )   PT: 12.9 sec;   INR: 1.18 ratio         PTT - ( 04 Aug 2018 21:57 )  PTT:28.7 sec      Urinalysis Basic - ( 04 Aug 2018 23:44 )    Color: Brown / Appearance: SL Turbid / SG: >1.030 / pH: x  Gluc: x / Ketone: Negative  / Bili: Large / Urobili: Negative   Blood: x / Protein: Trace / Nitrite: Negative   Leuk Esterase: Negative / RBC: 0-2 /HPF / WBC 6-10 /HPF   Sq Epi: x / Non Sq Epi: OCC /HPF / Bacteria: Few /HPF          RADIOLOGY & ADDITIONAL TESTS:    Imaging Personally Reviewed:    Consultant(s) Notes Reviewed:      Care Discussed with Consultants/Other Providers:
Patient is a 73y old  Male who presents with a chief complaint of Jaundice, lethargy/weakness (10 Aug 2018 07:12)       Pt is seen and examined  pt is awake and out of bed to chair  pt seems comfortable and denies any complaints at this time  concerned about acid reflux   po intake fair      PHYSICAL EXAM  General: adult in NAD  HEENT: clear oropharynx, icteric sclera,   Neck: supple  CV: normal S1/S2 with no murmur rubs or gallops  Lungs: positive air movement b/l ant lungs,clear to auscultation, no wheezes, no rales  Abdomen: soft non-tender non-distended,   Ext: no clubbing cyanosis or edema  Skin: no rashes and no petechiae  Neuro: alert and oriented X 4, no focal deficits        MEDICATIONS  (STANDING):  carvedilol 6.25 milliGRAM(s) Oral every 12 hours  rivaroxaban 20 milliGRAM(s) Oral every 24 hours    MEDICATIONS  (PRN):  aluminum hydroxide/magnesium hydroxide/simethicone Suspension 30 milliLiter(s) Oral every 4 hours PRN Dyspepsia  hydrOXYzine hydrochloride 25 milliGRAM(s) Oral every 8 hours PRN Itching  ondansetron Injectable 4 milliGRAM(s) IV Push every 6 hours PRN Nausea and/or Vomiting  simethicone 80 milliGRAM(s) Chew every 6 hours PRN Gas      Allergies    No Known Allergies    Intolerances        Vital Signs Last 24 Hrs  T(C): 36.6 (11 Aug 2018 11:50), Max: 37.1 (10 Aug 2018 20:44)  T(F): 97.9 (11 Aug 2018 11:50), Max: 98.7 (10 Aug 2018 20:44)  HR: 105 (11 Aug 2018 11:50) (86 - 108)  BP: 107/72 (11 Aug 2018 11:50) (94/60 - 122/81)  BP(mean): --  RR: 18 (11 Aug 2018 11:50) (17 - 18)  SpO2: 95% (11 Aug 2018 11:50) (95% - 96%)      LABS:                          11.8   11.02 )-----------( 309      ( 11 Aug 2018 08:47 )             33.4         Mean Cell Volume : 87.9 fl  Mean Cell Hemoglobin : 31.1 pg  Mean Cell Hemoglobin Concentration : 35.3 gm/dL        08-11    138  |  103  |  15  ----------------------------<  124<H>  3.4<L>   |  23  |  0.79    Ca    8.2<L>      11 Aug 2018 06:19    TPro  5.2<L>  /  Alb  2.5<L>  /  TBili  15.6<H>  /  DBili  10.0<H>  /  AST  46<H>  /  ALT  43  /  AlkPhos  265<H>  08-11      PTT - ( 11 Aug 2018 08:46 )  PTT:32.4 sec                  PT/INR - ( 09 Aug 2018 08:29 )   PT: 18.5 sec;   INR: 1.62 ratio         PTT - ( 10 Aug 2018 08:20 )  PTT:71.3 sec          RADIOLOGY & ADDITIONAL STUDIES:    Cytopathology - Non Gyn Report (08.07.18 @ 21:02)    Cytopathology - Non Gyn Report:   ACCESSION No:  [id number]        Final Diagnosis  PANCREAS, HEAD, ENDOSCOPIC ULTRASOUND-GUIDED (EUS) FNA  POSITIVE FOR MALIGNANT CELLS.  Consistent with adenocarcinoma.    - Cytology Thin prep slide and cell block reveals a paucicellular  specimen composed of rare malignant epithelial cells with  enlarged nuclei, anisonucleosis,  prominent nucleoli and delicate  cytoplasm in the background of necrosis and blood.  - Concurrent surgical case [id number] ws reviewed. Both cases  share similar cytomprhology.  - This case was reviewed for  with staff  cytopathologist. who concur(s) with the diagnosis.  - Report faxed to Dr. Hollingsworth's office on 08/09/18.  - Case reported to Tumor Registry.  - Please see concurrent biopsy specimen  report  [id number]    Screened by: Kitty NIXON(ASCP)  Verified by: Yasmine Dhaliwal MD  (Electronic Signature)  Reported on: 08/09/18 17:29 EDT, [address] [place] [address]  [id number]  Cytology technical processing performed at [address] [place] [id number]  _________________________________________________________________      Specimen(s) Submitted  PANCREAS, HEAD, ENDOSCOPIC ULTRASOUND-GUIDED (EUS) FNA      Clinical Information  Pancreas head lesion      Gross Description  Received: 20  ml of tan fluid in Formalin  Prepared: 1 ThinPrep slide, 1 cell block    Surgical Pathology Report (08.07.18 @ 19:00)    Surgical Pathology Report:   ACCESSION No:  [id number]    ROYCE NORMAN                      1        Assessment
Patient is a 73y old  Male who presents with a chief complaint of Jaundice, lethargy/weakness (10 Aug 2018 07:12)       Pt is seen and examined  pt is awake and out of bed to chair  pt seems comfortable and denies any complaints at this time; was able to tolerate fluids w/o vomiting;       REVIEW OF SYSTEMS:    CONSTITUTIONAL: No weakness, fevers or chills  EYES/ENT: No visual changes;  No vertigo or throat pain   NECK: No pain or stiffness  RESPIRATORY: No cough, wheezing, hemoptysis; No shortness of breath  CARDIOVASCULAR: No chest pain or palpitations  GASTROINTESTINAL: No abdominal or epigastric pain. No nausea, vomiting, or hematemesis; No diarrhea or constipation. No melena or hematochezia.  GENITOURINARY: No dysuria, frequency or hematuria  NEUROLOGICAL: No numbness or weakness  SKIN: No itching, burning, rashes, or lesions     MEDICATIONS  (STANDING):  carvedilol 6.25 milliGRAM(s) Oral every 12 hours  heparin  Infusion.  Unit(s)/Hr (16 mL/Hr) IV Continuous <Continuous>    MEDICATIONS  (PRN):  aluminum hydroxide/magnesium hydroxide/simethicone Suspension 30 milliLiter(s) Oral every 4 hours PRN Dyspepsia  heparin  Injectable 7000 Unit(s) IV Push every 6 hours PRN For aPTT less than 40  heparin  Injectable 3500 Unit(s) IV Push every 6 hours PRN For aPTT between 40 - 57  hydrOXYzine hydrochloride 25 milliGRAM(s) Oral every 8 hours PRN Itching  ondansetron Injectable 4 milliGRAM(s) IV Push every 6 hours PRN Nausea and/or Vomiting  simethicone 80 milliGRAM(s) Chew every 6 hours PRN Gas      Allergies    No Known Allergies    Intolerances        Vital Signs Last 24 Hrs  T(C): 36.7 (10 Aug 2018 03:59), Max: 36.8 (09 Aug 2018 13:50)  T(F): 98.1 (10 Aug 2018 03:59), Max: 98.2 (09 Aug 2018 13:50)  HR: 93 (10 Aug 2018 03:59) (93 - 101)  BP: 111/76 (10 Aug 2018 03:59) (110/74 - 118/72)  BP(mean): --  RR: 18 (10 Aug 2018 03:59) (18 - 18)  SpO2: 95% (10 Aug 2018 03:59) (93% - 95%)    PHYSICAL EXAM  General: adult in NAD  HEENT: clear oropharynx, icteric sclera,   Neck: supple  CV: normal S1/S2 with no murmur rubs or gallops  Lungs: positive air movement b/l ant lungs,clear to auscultation, no wheezes, no rales  Abdomen: soft non-tender non-distended,   Ext: no clubbing cyanosis or edema  Skin: no rashes and no petechiae  Neuro: alert and oriented X 4, no focal deficits      LABS:                          11.9   11.74 )-----------( 286      ( 09 Aug 2018 08:36 )             32.8         Mean Cell Volume : 87.9 fl  Mean Cell Hemoglobin : 31.9 pg  Mean Cell Hemoglobin Concentration : 36.3 gm/dL    Serial CBC's  08-09 @ 08:36  Hct-32.8 / Hgb-11.9 / Plat-286 / RBC-3.73 / WBC-11.74      Serial CBC's  08-08 @ 16:32  Hct-38.1 / Hgb-12.9 / Plat-326 / RBC-3.90 / WBC-14.1      Serial CBC's  08-08 @ 07:50  Hct-35.2 / Hgb-12.8 / Plat-352 / RBC-3.95 / WBC-12.01      Serial CBC's  08-07 @ 08:16  Hct-38.0 / Hgb-13.5 / Plat-374 / RBC-4.19 / WBC-11.46      Serial CBC's  08-06 @ 11:16  Hct-39.0 / Hgb-13.0 / Plat-299 / RBC-3.99 / WBC-8.5            08-10    138  |  103  |  17  ----------------------------<  129<H>  3.7   |  24  |  0.74    Ca    8.2<L>      10 Aug 2018 08:20    TPro  5.1<L>  /  Alb  2.8<L>  /  TBili  20.9<H>  /  DBili  x   /  AST  51<H>  /  ALT  47<H>  /  AlkPhos  308<H>  08-09      PT/INR - ( 09 Aug 2018 08:29 )   PT: 18.5 sec;   INR: 1.62 ratio         PTT - ( 10 Aug 2018 08:20 )  PTT:71.3 sec          RADIOLOGY & ADDITIONAL STUDIES:    Cytopathology - Non Gyn Report (08.07.18 @ 21:02)    Cytopathology - Non Gyn Report:   ACCESSION No:  [id number]        Final Diagnosis  PANCREAS, HEAD, ENDOSCOPIC ULTRASOUND-GUIDED (EUS) FNA  POSITIVE FOR MALIGNANT CELLS.  Consistent with adenocarcinoma.    - Cytology Thin prep slide and cell block reveals a paucicellular  specimen composed of rare malignant epithelial cells with  enlarged nuclei, anisonucleosis,  prominent nucleoli and delicate  cytoplasm in the background of necrosis and blood.  - Concurrent surgical case [id number] ws reviewed. Both cases  share similar cytomprhology.  - This case was reviewed for  with staff  cytopathologist. who concur(s) with the diagnosis.  - Report faxed to Dr. Hollingsworth's office on 08/09/18.  - Case reported to Tumor Registry.  - Please see concurrent biopsy specimen  report  [id number]    Screened by: Kitty NIXON(ASCP)  Verified by: Yasmine Dhaliwal MD  (Electronic Signature)  Reported on: 08/09/18 17:29 EDT, [address] [place] [address]  [id number]  Cytology technical processing performed at [address] [place] [address]  _________________________________________________________________      Specimen(s) Submitted  PANCREAS, HEAD, ENDOSCOPIC ULTRASOUND-GUIDED (EUS) FNA      Clinical Information  Pancreas head lesion      Gross Description  Received: 20  ml of tan fluid in Formalin  Prepared: 1 ThinPrep slide, 1 cell block    Surgical Pathology Report (08.07.18 @ 19:00)    Surgical Pathology Report:   ACCESSION No:  10 [id number]    ROYCE NORMAN                      1        Assessment
Patient is a 73y old  Male who presents with a chief complaint of Jaundice, lethargy/weakness (10 Aug 2018 07:12)      SUBJECTIVE / OVERNIGHT EVENTS: Comfortable without new complaints. Family at bedside. Tolerating diet.  Review of Systems  chest pain no  palpitations no  sob no  nausea no  headache no    MEDICATIONS  (STANDING):  carvedilol 6.25 milliGRAM(s) Oral every 12 hours  rivaroxaban 20 milliGRAM(s) Oral every 24 hours    MEDICATIONS  (PRN):  aluminum hydroxide/magnesium hydroxide/simethicone Suspension 30 milliLiter(s) Oral every 4 hours PRN Dyspepsia  hydrOXYzine hydrochloride 25 milliGRAM(s) Oral every 8 hours PRN Itching  ondansetron Injectable 4 milliGRAM(s) IV Push every 6 hours PRN Nausea and/or Vomiting  simethicone 80 milliGRAM(s) Chew every 6 hours PRN Gas      Vital Signs Last 24 Hrs  T(C): 36.6 (11 Aug 2018 11:50), Max: 37.1 (10 Aug 2018 20:44)  T(F): 97.9 (11 Aug 2018 11:50), Max: 98.7 (10 Aug 2018 20:44)  HR: 105 (11 Aug 2018 11:50) (86 - 108)  BP: 107/72 (11 Aug 2018 11:50) (94/60 - 122/81)  BP(mean): --  RR: 18 (11 Aug 2018 11:50) (17 - 18)  SpO2: 95% (11 Aug 2018 11:50) (95% - 96%)    PHYSICAL EXAM:  GENERAL: NAD, well-developed  HEAD:  Atraumatic, Normocephalic  EYES: EOMI, PERRLA, conjunctiva and sclera jaundiced  NECK: Supple, No JVD  CHEST/LUNG: Clear to auscultation bilaterally; No wheeze  HEART: Regular rate and rhythm; No murmurs, rubs, or gallops  ABDOMEN: Soft, Nontender, Nondistended; Bowel sounds present  EXTREMITIES:  2+ Peripheral Pulses, No clubbing, cyanosis, or edema  PSYCH: AAOx3  NEUROLOGY: non-focal  SKIN: No rashes or lesions    LABS:                        11.8   11.02 )-----------( 309      ( 11 Aug 2018 08:47 )             33.4     08-11    138  |  103  |  15  ----------------------------<  124<H>  3.4<L>   |  23  |  0.79    Ca    8.2<L>      11 Aug 2018 06:19    TPro  5.2<L>  /  Alb  2.5<L>  /  TBili  15.6<H>  /  DBili  10.0<H>  /  AST  46<H>  /  ALT  43  /  AlkPhos  265<H>  08-11    PTT - ( 11 Aug 2018 08:46 )  PTT:32.4 sec            RADIOLOGY & ADDITIONAL TESTS:    Imaging Personally Reviewed:    Consultant(s) Notes Reviewed:      Care Discussed with Consultants/Other Providers:
Patient is a 73y old  Male who presents with a chief complaint of Jaundice, lethargy/weakness (10 Aug 2018 07:12)      SUBJECTIVE / OVERNIGHT EVENTS: feels better. Tolerating diet.  Review of Systems  chest pain no  palpitations no  sob no  nausea no  headache no    MEDICATIONS  (STANDING):  carvedilol 6.25 milliGRAM(s) Oral every 12 hours  rivaroxaban 20 milliGRAM(s) Oral every 24 hours    MEDICATIONS  (PRN):  aluminum hydroxide/magnesium hydroxide/simethicone Suspension 30 milliLiter(s) Oral every 4 hours PRN Dyspepsia  hydrOXYzine hydrochloride 25 milliGRAM(s) Oral every 8 hours PRN Itching  ondansetron Injectable 4 milliGRAM(s) IV Push every 6 hours PRN Nausea and/or Vomiting  simethicone 80 milliGRAM(s) Chew every 6 hours PRN Gas      Vital Signs Last 24 Hrs  T(C): 37.1 (10 Aug 2018 20:44), Max: 37.1 (10 Aug 2018 20:44)  T(F): 98.7 (10 Aug 2018 20:44), Max: 98.7 (10 Aug 2018 20:44)  HR: 108 (10 Aug 2018 20:44) (86 - 108)  BP: 94/60 (10 Aug 2018 20:44) (94/60 - 122/81)  BP(mean): --  RR: 17 (10 Aug 2018 20:44) (17 - 18)  SpO2: 95% (10 Aug 2018 20:44) (94% - 96%)    PHYSICAL EXAM:  GENERAL: NAD, well-developed  HEAD:  Atraumatic, Normocephalic  EYES: EOMI, PERRLA, jaundice improving   NECK: Supple, No JVD  CHEST/LUNG: Clear to auscultation bilaterally; No wheeze  HEART: Regular rate and rhythm; No murmurs, rubs, or gallops  ABDOMEN: Soft, Nontender, Nondistended; Bowel sounds present  EXTREMITIES:  2+ Peripheral Pulses, No clubbing, cyanosis, or edema  PSYCH: AAOx3  NEUROLOGY: non-focal  SKIN: No rashes or lesions    LABS:                        11.9   11.52 )-----------( 294      ( 10 Aug 2018 10:13 )             33.3     08-10    138  |  103  |  17  ----------------------------<  129<H>  3.7   |  24  |  0.74    Ca    8.2<L>      10 Aug 2018 08:20    TPro  5.1<L>  /  Alb  2.8<L>  /  TBili  20.9<H>  /  DBili  x   /  AST  51<H>  /  ALT  47<H>  /  AlkPhos  308<H>  08-09    PT/INR - ( 09 Aug 2018 08:29 )   PT: 18.5 sec;   INR: 1.62 ratio         PTT - ( 10 Aug 2018 08:20 )  PTT:71.3 sec        Telemetry A Fib 90    RADIOLOGY & ADDITIONAL TESTS:    Imaging Personally Reviewed:    Consultant(s) Notes Reviewed:      Care Discussed with Consultants/Other Providers:

## 2018-08-13 PROBLEM — I10 ESSENTIAL (PRIMARY) HYPERTENSION: Chronic | Status: ACTIVE | Noted: 2018-08-04

## 2018-08-13 PROBLEM — I48.91 UNSPECIFIED ATRIAL FIBRILLATION: Chronic | Status: ACTIVE | Noted: 2018-08-04

## 2018-08-14 ENCOUNTER — OUTPATIENT (OUTPATIENT)
Dept: OUTPATIENT SERVICES | Facility: HOSPITAL | Age: 73
LOS: 1 days | Discharge: HOME | End: 2018-08-14

## 2018-08-14 DIAGNOSIS — Z98.890 OTHER SPECIFIED POSTPROCEDURAL STATES: Chronic | ICD-10-CM

## 2018-08-14 DIAGNOSIS — C25.9 MALIGNANT NEOPLASM OF PANCREAS, UNSPECIFIED: ICD-10-CM

## 2018-08-21 ENCOUNTER — APPOINTMENT (OUTPATIENT)
Dept: HEMATOLOGY ONCOLOGY | Facility: CLINIC | Age: 73
End: 2018-08-21
Payer: MEDICARE

## 2018-08-21 VITALS
OXYGEN SATURATION: 98 % | BODY MASS INDEX: 29.48 KG/M2 | HEART RATE: 104 BPM | TEMPERATURE: 99.4 F | HEIGHT: 68.46 IN | SYSTOLIC BLOOD PRESSURE: 105 MMHG | WEIGHT: 196.74 LBS | RESPIRATION RATE: 16 BRPM | DIASTOLIC BLOOD PRESSURE: 74 MMHG

## 2018-08-21 DIAGNOSIS — R18.8 OTHER ASCITES: ICD-10-CM

## 2018-08-21 DIAGNOSIS — Z80.3 FAMILY HISTORY OF MALIGNANT NEOPLASM OF BREAST: ICD-10-CM

## 2018-08-21 DIAGNOSIS — Z78.9 OTHER SPECIFIED HEALTH STATUS: ICD-10-CM

## 2018-08-21 DIAGNOSIS — Z86.79 PERSONAL HISTORY OF OTHER DISEASES OF THE CIRCULATORY SYSTEM: ICD-10-CM

## 2018-08-21 DIAGNOSIS — Z87.891 PERSONAL HISTORY OF NICOTINE DEPENDENCE: ICD-10-CM

## 2018-08-21 PROCEDURE — 99205 OFFICE O/P NEW HI 60 MIN: CPT

## 2018-08-21 RX ORDER — PANCRELIPASE 60000; 12000; 38000 [USP'U]/1; [USP'U]/1; [USP'U]/1
12000-38000 CAPSULE, DELAYED RELEASE PELLETS ORAL
Qty: 180 | Refills: 6 | Status: ACTIVE | COMMUNITY
Start: 2018-08-21 | End: 1900-01-01

## 2018-08-21 RX ORDER — OXYCODONE 5 MG/1
5 TABLET ORAL
Qty: 90 | Refills: 0 | Status: ACTIVE | COMMUNITY
Start: 2018-08-21 | End: 1900-01-01

## 2018-08-23 PROBLEM — Z86.79 HISTORY OF ATRIAL FIBRILLATION: Status: RESOLVED | Noted: 2018-08-23 | Resolved: 2018-08-23

## 2018-08-23 PROBLEM — Z87.891 FORMER SMOKER: Status: ACTIVE | Noted: 2018-08-23

## 2018-08-23 PROBLEM — Z80.3 FAMILY HISTORY OF MALIGNANT NEOPLASM OF BREAST: Status: ACTIVE | Noted: 2018-08-23

## 2018-08-23 PROBLEM — Z78.9 ALCOHOL INGESTION: Status: ACTIVE | Noted: 2018-08-23

## 2018-08-24 ENCOUNTER — OTHER (OUTPATIENT)
Age: 73
End: 2018-08-24

## 2018-08-24 ENCOUNTER — FORM ENCOUNTER (OUTPATIENT)
Age: 73
End: 2018-08-24

## 2018-08-24 RX ORDER — METOCLOPRAMIDE 5 MG/1
5 TABLET ORAL
Qty: 30 | Refills: 3 | Status: ACTIVE | COMMUNITY
Start: 2018-08-24 | End: 1900-01-01

## 2018-08-25 ENCOUNTER — OUTPATIENT (OUTPATIENT)
Dept: OUTPATIENT SERVICES | Facility: HOSPITAL | Age: 73
LOS: 1 days | End: 2018-08-25
Payer: MEDICARE

## 2018-08-25 ENCOUNTER — APPOINTMENT (OUTPATIENT)
Dept: NUCLEAR MEDICINE | Facility: IMAGING CENTER | Age: 73
End: 2018-08-25
Payer: MEDICARE

## 2018-08-25 DIAGNOSIS — Z98.890 OTHER SPECIFIED POSTPROCEDURAL STATES: Chronic | ICD-10-CM

## 2018-08-25 DIAGNOSIS — C25.9 MALIGNANT NEOPLASM OF PANCREAS, UNSPECIFIED: ICD-10-CM

## 2018-08-25 PROCEDURE — 78815 PET IMAGE W/CT SKULL-THIGH: CPT | Mod: 26,PI

## 2018-08-25 PROCEDURE — A9552: CPT

## 2018-08-25 PROCEDURE — 78815 PET IMAGE W/CT SKULL-THIGH: CPT

## 2018-08-27 ENCOUNTER — LABORATORY RESULT (OUTPATIENT)
Age: 73
End: 2018-08-27

## 2018-08-27 ENCOUNTER — RESULT REVIEW (OUTPATIENT)
Age: 73
End: 2018-08-27

## 2018-08-27 ENCOUNTER — APPOINTMENT (OUTPATIENT)
Dept: HEMATOLOGY ONCOLOGY | Facility: CLINIC | Age: 73
End: 2018-08-27

## 2018-08-27 LAB
BASOPHILS # BLD AUTO: 0 K/UL — SIGNIFICANT CHANGE UP (ref 0–0.2)
BASOPHILS NFR BLD AUTO: 0.4 % — SIGNIFICANT CHANGE UP (ref 0–2)
EOSINOPHIL # BLD AUTO: 0.1 K/UL — SIGNIFICANT CHANGE UP (ref 0–0.5)
EOSINOPHIL NFR BLD AUTO: 1.5 % — SIGNIFICANT CHANGE UP (ref 0–6)
HCT VFR BLD CALC: 37.7 % — LOW (ref 39–50)
HGB BLD-MCNC: 12.2 G/DL — LOW (ref 13–17)
LYMPHOCYTES # BLD AUTO: 1.5 K/UL — SIGNIFICANT CHANGE UP (ref 1–3.3)
LYMPHOCYTES # BLD AUTO: 15.4 % — SIGNIFICANT CHANGE UP (ref 13–44)
MCHC RBC-ENTMCNC: 32.4 G/DL — SIGNIFICANT CHANGE UP (ref 32–36)
MCHC RBC-ENTMCNC: 32.4 PG — SIGNIFICANT CHANGE UP (ref 27–34)
MCV RBC AUTO: 99.9 FL — SIGNIFICANT CHANGE UP (ref 80–100)
MONOCYTES # BLD AUTO: 0.8 K/UL — SIGNIFICANT CHANGE UP (ref 0–0.9)
MONOCYTES NFR BLD AUTO: 8.3 % — SIGNIFICANT CHANGE UP (ref 2–14)
NEUTROPHILS # BLD AUTO: 7.2 K/UL — SIGNIFICANT CHANGE UP (ref 1.8–7.4)
NEUTROPHILS NFR BLD AUTO: 74.5 % — SIGNIFICANT CHANGE UP (ref 43–77)
PLATELET # BLD AUTO: 385 K/UL — SIGNIFICANT CHANGE UP (ref 150–400)
RBC # BLD: 3.77 M/UL — LOW (ref 4.2–5.8)
RBC # FLD: 14 % — SIGNIFICANT CHANGE UP (ref 10.3–14.5)
WBC # BLD: 9.7 K/UL — SIGNIFICANT CHANGE UP (ref 3.8–10.5)
WBC # FLD AUTO: 9.7 K/UL — SIGNIFICANT CHANGE UP (ref 3.8–10.5)

## 2018-08-29 ENCOUNTER — RESULT REVIEW (OUTPATIENT)
Age: 73
End: 2018-08-29

## 2018-08-29 ENCOUNTER — APPOINTMENT (OUTPATIENT)
Dept: INFUSION THERAPY | Facility: HOSPITAL | Age: 73
End: 2018-08-29

## 2018-08-29 ENCOUNTER — FORM ENCOUNTER (OUTPATIENT)
Age: 73
End: 2018-08-29

## 2018-08-29 LAB
BASOPHILS # BLD AUTO: 0 K/UL — SIGNIFICANT CHANGE UP (ref 0–0.2)
BASOPHILS NFR BLD AUTO: 0.2 % — SIGNIFICANT CHANGE UP (ref 0–2)
EOSINOPHIL # BLD AUTO: 0.2 K/UL — SIGNIFICANT CHANGE UP (ref 0–0.5)
EOSINOPHIL NFR BLD AUTO: 1.7 % — SIGNIFICANT CHANGE UP (ref 0–6)
HCT VFR BLD CALC: 36.5 % — LOW (ref 39–50)
HGB BLD-MCNC: 12.1 G/DL — LOW (ref 13–17)
LYMPHOCYTES # BLD AUTO: 1.3 K/UL — SIGNIFICANT CHANGE UP (ref 1–3.3)
LYMPHOCYTES # BLD AUTO: 12.6 % — LOW (ref 13–44)
MCHC RBC-ENTMCNC: 32.8 PG — SIGNIFICANT CHANGE UP (ref 27–34)
MCHC RBC-ENTMCNC: 33.3 G/DL — SIGNIFICANT CHANGE UP (ref 32–36)
MCV RBC AUTO: 98.5 FL — SIGNIFICANT CHANGE UP (ref 80–100)
MONOCYTES # BLD AUTO: 1 K/UL — HIGH (ref 0–0.9)
MONOCYTES NFR BLD AUTO: 9.4 % — SIGNIFICANT CHANGE UP (ref 2–14)
NEUTROPHILS # BLD AUTO: 8.1 K/UL — HIGH (ref 1.8–7.4)
NEUTROPHILS NFR BLD AUTO: 76.1 % — SIGNIFICANT CHANGE UP (ref 43–77)
PLATELET # BLD AUTO: 396 K/UL — SIGNIFICANT CHANGE UP (ref 150–400)
RBC # BLD: 3.7 M/UL — LOW (ref 4.2–5.8)
RBC # FLD: 13.8 % — SIGNIFICANT CHANGE UP (ref 10.3–14.5)
WBC # BLD: 10.6 K/UL — HIGH (ref 3.8–10.5)
WBC # FLD AUTO: 10.6 K/UL — HIGH (ref 3.8–10.5)

## 2018-08-30 ENCOUNTER — OUTPATIENT (OUTPATIENT)
Dept: OUTPATIENT SERVICES | Facility: HOSPITAL | Age: 73
LOS: 1 days | End: 2018-08-30

## 2018-08-30 ENCOUNTER — RESULT REVIEW (OUTPATIENT)
Age: 73
End: 2018-08-30

## 2018-08-30 ENCOUNTER — APPOINTMENT (OUTPATIENT)
Dept: HEMATOLOGY ONCOLOGY | Facility: CLINIC | Age: 73
End: 2018-08-30

## 2018-08-30 ENCOUNTER — APPOINTMENT (OUTPATIENT)
Dept: ULTRASOUND IMAGING | Facility: IMAGING CENTER | Age: 73
End: 2018-08-30
Payer: MEDICARE

## 2018-08-30 DIAGNOSIS — C25.9 MALIGNANT NEOPLASM OF PANCREAS, UNSPECIFIED: ICD-10-CM

## 2018-08-30 DIAGNOSIS — R11.2 NAUSEA WITH VOMITING, UNSPECIFIED: ICD-10-CM

## 2018-08-30 DIAGNOSIS — Z98.890 OTHER SPECIFIED POSTPROCEDURAL STATES: Chronic | ICD-10-CM

## 2018-08-30 DIAGNOSIS — Z51.11 ENCOUNTER FOR ANTINEOPLASTIC CHEMOTHERAPY: ICD-10-CM

## 2018-08-30 DIAGNOSIS — R18.8 OTHER ASCITES: ICD-10-CM

## 2018-08-30 LAB
APTT BLD: 27.3 SEC — LOW (ref 27.5–37.4)
BASOPHILS # BLD AUTO: 0 K/UL — SIGNIFICANT CHANGE UP (ref 0–0.2)
BASOPHILS NFR BLD AUTO: 0 % — SIGNIFICANT CHANGE UP (ref 0–2)
EOSINOPHIL # BLD AUTO: 0 K/UL — SIGNIFICANT CHANGE UP (ref 0–0.5)
EOSINOPHIL NFR BLD AUTO: 0.4 % — SIGNIFICANT CHANGE UP (ref 0–6)
HCT VFR BLD CALC: 35 % — LOW (ref 39–50)
HGB BLD-MCNC: 11.4 G/DL — LOW (ref 13–17)
INR BLD: 1.15 RATIO — SIGNIFICANT CHANGE UP (ref 0.88–1.16)
LYMPHOCYTES # BLD AUTO: 1 K/UL — SIGNIFICANT CHANGE UP (ref 1–3.3)
LYMPHOCYTES # BLD AUTO: 9 % — LOW (ref 13–44)
MCHC RBC-ENTMCNC: 32.6 G/DL — SIGNIFICANT CHANGE UP (ref 32–36)
MCHC RBC-ENTMCNC: 32.6 PG — SIGNIFICANT CHANGE UP (ref 27–34)
MCV RBC AUTO: 99.9 FL — SIGNIFICANT CHANGE UP (ref 80–100)
MONOCYTES # BLD AUTO: 0.4 K/UL — SIGNIFICANT CHANGE UP (ref 0–0.9)
MONOCYTES NFR BLD AUTO: 3.8 % — SIGNIFICANT CHANGE UP (ref 2–14)
NEUTROPHILS # BLD AUTO: 9.2 K/UL — HIGH (ref 1.8–7.4)
NEUTROPHILS NFR BLD AUTO: 86.8 % — HIGH (ref 43–77)
PLATELET # BLD AUTO: 342 K/UL — SIGNIFICANT CHANGE UP (ref 150–400)
PROTHROM AB SERPL-ACNC: 13 SEC — SIGNIFICANT CHANGE UP (ref 10–13.1)
RBC # BLD: 3.5 M/UL — LOW (ref 4.2–5.8)
RBC # FLD: 13.6 % — SIGNIFICANT CHANGE UP (ref 10.3–14.5)
WBC # BLD: 10.5 K/UL — SIGNIFICANT CHANGE UP (ref 3.8–10.5)
WBC # FLD AUTO: 10.5 K/UL — SIGNIFICANT CHANGE UP (ref 3.8–10.5)

## 2018-08-30 PROCEDURE — 49083 ABD PARACENTESIS W/IMAGING: CPT

## 2018-08-31 ENCOUNTER — INPATIENT (INPATIENT)
Facility: HOSPITAL | Age: 73
LOS: 3 days | Discharge: HOME | DRG: 871 | End: 2018-09-04
Attending: INTERNAL MEDICINE | Admitting: INTERNAL MEDICINE
Payer: MEDICARE

## 2018-08-31 VITALS
SYSTOLIC BLOOD PRESSURE: 102 MMHG | RESPIRATION RATE: 22 BRPM | HEART RATE: 100 BPM | WEIGHT: 190.04 LBS | OXYGEN SATURATION: 95 % | HEIGHT: 68 IN | TEMPERATURE: 100 F | DIASTOLIC BLOOD PRESSURE: 59 MMHG

## 2018-08-31 DIAGNOSIS — G56.00 CARPAL TUNNEL SYNDROME, UNSPECIFIED UPPER LIMB: Chronic | ICD-10-CM

## 2018-08-31 DIAGNOSIS — Z29.9 ENCOUNTER FOR PROPHYLACTIC MEASURES, UNSPECIFIED: ICD-10-CM

## 2018-08-31 DIAGNOSIS — R50.9 FEVER, UNSPECIFIED: ICD-10-CM

## 2018-08-31 DIAGNOSIS — Z98.890 OTHER SPECIFIED POSTPROCEDURAL STATES: Chronic | ICD-10-CM

## 2018-08-31 DIAGNOSIS — R18.8 OTHER ASCITES: ICD-10-CM

## 2018-08-31 DIAGNOSIS — C25.9 MALIGNANT NEOPLASM OF PANCREAS, UNSPECIFIED: ICD-10-CM

## 2018-08-31 DIAGNOSIS — I95.9 HYPOTENSION, UNSPECIFIED: ICD-10-CM

## 2018-08-31 DIAGNOSIS — I48.91 UNSPECIFIED ATRIAL FIBRILLATION: ICD-10-CM

## 2018-08-31 DIAGNOSIS — Z95.5 PRESENCE OF CORONARY ANGIOPLASTY IMPLANT AND GRAFT: Chronic | ICD-10-CM

## 2018-08-31 LAB
ALBUMIN SERPL ELPH-MCNC: 2.6 G/DL — LOW (ref 3.3–5)
ALP SERPL-CCNC: 130 U/L — HIGH (ref 40–120)
ALT FLD-CCNC: 24 U/L — SIGNIFICANT CHANGE UP (ref 10–45)
ANION GAP SERPL CALC-SCNC: 13 MMOL/L — SIGNIFICANT CHANGE UP (ref 5–17)
APPEARANCE UR: ABNORMAL
APTT BLD: 30.5 SEC — SIGNIFICANT CHANGE UP (ref 27.5–37.4)
AST SERPL-CCNC: 35 U/L — SIGNIFICANT CHANGE UP (ref 10–40)
BACTERIA # UR AUTO: ABNORMAL /HPF
BASOPHILS # BLD AUTO: 0 K/UL — SIGNIFICANT CHANGE UP (ref 0–0.2)
BASOPHILS NFR BLD AUTO: 0.1 % — SIGNIFICANT CHANGE UP (ref 0–2)
BILIRUB SERPL-MCNC: 5.3 MG/DL — HIGH (ref 0.2–1.2)
BILIRUB UR-MCNC: ABNORMAL
BUN SERPL-MCNC: 15 MG/DL — SIGNIFICANT CHANGE UP (ref 7–23)
CALCIUM SERPL-MCNC: 8.1 MG/DL — LOW (ref 8.4–10.5)
CHLORIDE SERPL-SCNC: 101 MMOL/L — SIGNIFICANT CHANGE UP (ref 96–108)
CO2 SERPL-SCNC: 24 MMOL/L — SIGNIFICANT CHANGE UP (ref 22–31)
COLOR SPEC: ABNORMAL
CREAT SERPL-MCNC: 0.83 MG/DL — SIGNIFICANT CHANGE UP (ref 0.5–1.3)
DIFF PNL FLD: ABNORMAL
EOSINOPHIL # BLD AUTO: 0.1 K/UL — SIGNIFICANT CHANGE UP (ref 0–0.5)
EOSINOPHIL NFR BLD AUTO: 0.3 % — SIGNIFICANT CHANGE UP (ref 0–6)
GLUCOSE SERPL-MCNC: 126 MG/DL — HIGH (ref 70–99)
GLUCOSE UR QL: NEGATIVE — SIGNIFICANT CHANGE UP
HCT VFR BLD CALC: 34 % — LOW (ref 39–50)
HGB BLD-MCNC: 11 G/DL — LOW (ref 13–17)
INR BLD: 1.42 RATIO — HIGH (ref 0.88–1.16)
KETONES UR-MCNC: NEGATIVE — SIGNIFICANT CHANGE UP
LEUKOCYTE ESTERASE UR-ACNC: NEGATIVE — SIGNIFICANT CHANGE UP
LYMPHOCYTES # BLD AUTO: 0.7 K/UL — LOW (ref 1–3.3)
LYMPHOCYTES # BLD AUTO: 4.3 % — LOW (ref 13–44)
MCHC RBC-ENTMCNC: 32.3 GM/DL — SIGNIFICANT CHANGE UP (ref 32–36)
MCHC RBC-ENTMCNC: 32.3 PG — SIGNIFICANT CHANGE UP (ref 27–34)
MCV RBC AUTO: 100 FL — SIGNIFICANT CHANGE UP (ref 80–100)
MONOCYTES # BLD AUTO: 0.1 K/UL — SIGNIFICANT CHANGE UP (ref 0–0.9)
MONOCYTES NFR BLD AUTO: 0.4 % — LOW (ref 2–14)
NEUTROPHILS # BLD AUTO: 14.9 K/UL — HIGH (ref 1.8–7.4)
NEUTROPHILS NFR BLD AUTO: 95 % — HIGH (ref 43–77)
NITRITE UR-MCNC: NEGATIVE — SIGNIFICANT CHANGE UP
NON-GYNECOLOGICAL CYTOLOGY STUDY: SIGNIFICANT CHANGE UP
PH UR: 6 — SIGNIFICANT CHANGE UP (ref 5–8)
PLATELET # BLD AUTO: 284 K/UL — SIGNIFICANT CHANGE UP (ref 150–400)
POTASSIUM SERPL-MCNC: 3.7 MMOL/L — SIGNIFICANT CHANGE UP (ref 3.5–5.3)
POTASSIUM SERPL-SCNC: 3.7 MMOL/L — SIGNIFICANT CHANGE UP (ref 3.5–5.3)
PROT SERPL-MCNC: 5.8 G/DL — LOW (ref 6–8.3)
PROT UR-MCNC: 100 MG/DL
PROTHROM AB SERPL-ACNC: 15.4 SEC — HIGH (ref 9.8–12.7)
RBC # BLD: 3.39 M/UL — LOW (ref 4.2–5.8)
RBC # FLD: 13.6 % — SIGNIFICANT CHANGE UP (ref 10.3–14.5)
RBC CASTS # UR COMP ASSIST: ABNORMAL /HPF (ref 0–2)
SODIUM SERPL-SCNC: 138 MMOL/L — SIGNIFICANT CHANGE UP (ref 135–145)
SP GR SPEC: 1.03 — HIGH (ref 1.01–1.02)
UROBILINOGEN FLD QL: >8 MG/DL
WBC # BLD: 15.7 K/UL — HIGH (ref 3.8–10.5)
WBC # FLD AUTO: 15.7 K/UL — HIGH (ref 3.8–10.5)
WBC UR QL: ABNORMAL /HPF (ref 0–5)

## 2018-08-31 PROCEDURE — 99285 EMERGENCY DEPT VISIT HI MDM: CPT | Mod: 25

## 2018-08-31 PROCEDURE — 71045 X-RAY EXAM CHEST 1 VIEW: CPT | Mod: 26

## 2018-08-31 PROCEDURE — 93010 ELECTROCARDIOGRAM REPORT: CPT

## 2018-08-31 PROCEDURE — 99223 1ST HOSP IP/OBS HIGH 75: CPT

## 2018-08-31 RX ORDER — SODIUM CHLORIDE 9 MG/ML
2600 INJECTION INTRAMUSCULAR; INTRAVENOUS; SUBCUTANEOUS ONCE
Qty: 0 | Refills: 0 | Status: COMPLETED | OUTPATIENT
Start: 2018-08-31 | End: 2018-08-31

## 2018-08-31 RX ORDER — PANTOPRAZOLE SODIUM 20 MG/1
40 TABLET, DELAYED RELEASE ORAL
Qty: 0 | Refills: 0 | Status: DISCONTINUED | OUTPATIENT
Start: 2018-08-31 | End: 2018-09-04

## 2018-08-31 RX ORDER — VANCOMYCIN HCL 1 G
1000 VIAL (EA) INTRAVENOUS EVERY 12 HOURS
Qty: 0 | Refills: 0 | Status: DISCONTINUED | OUTPATIENT
Start: 2018-08-31 | End: 2018-09-01

## 2018-08-31 RX ORDER — POTASSIUM CHLORIDE 20 MEQ
40 PACKET (EA) ORAL ONCE
Qty: 0 | Refills: 0 | Status: COMPLETED | OUTPATIENT
Start: 2018-08-31 | End: 2018-09-01

## 2018-08-31 RX ORDER — VANCOMYCIN HCL 1 G
1000 VIAL (EA) INTRAVENOUS ONCE
Qty: 0 | Refills: 0 | Status: COMPLETED | OUTPATIENT
Start: 2018-08-31 | End: 2018-08-31

## 2018-08-31 RX ORDER — ACETAMINOPHEN 500 MG
975 TABLET ORAL ONCE
Qty: 0 | Refills: 0 | Status: COMPLETED | OUTPATIENT
Start: 2018-08-31 | End: 2018-08-31

## 2018-08-31 RX ORDER — SODIUM CHLORIDE 9 MG/ML
1000 INJECTION, SOLUTION INTRAVENOUS ONCE
Qty: 0 | Refills: 0 | Status: COMPLETED | OUTPATIENT
Start: 2018-08-31 | End: 2018-08-31

## 2018-08-31 RX ORDER — METOCLOPRAMIDE HCL 10 MG
10 TABLET ORAL EVERY 8 HOURS
Qty: 0 | Refills: 0 | Status: DISCONTINUED | OUTPATIENT
Start: 2018-08-31 | End: 2018-09-04

## 2018-08-31 RX ORDER — METOPROLOL TARTRATE 50 MG
25 TABLET ORAL ONCE
Qty: 0 | Refills: 0 | Status: COMPLETED | OUTPATIENT
Start: 2018-08-31 | End: 2018-09-01

## 2018-08-31 RX ORDER — LIPASE/PROTEASE/AMYLASE 16-48-48K
1 CAPSULE,DELAYED RELEASE (ENTERIC COATED) ORAL THREE TIMES A DAY
Qty: 0 | Refills: 0 | Status: DISCONTINUED | OUTPATIENT
Start: 2018-08-31 | End: 2018-09-04

## 2018-08-31 RX ORDER — PIPERACILLIN AND TAZOBACTAM 4; .5 G/20ML; G/20ML
3.38 INJECTION, POWDER, LYOPHILIZED, FOR SOLUTION INTRAVENOUS EVERY 8 HOURS
Qty: 0 | Refills: 0 | Status: DISCONTINUED | OUTPATIENT
Start: 2018-08-31 | End: 2018-09-03

## 2018-08-31 RX ORDER — PIPERACILLIN AND TAZOBACTAM 4; .5 G/20ML; G/20ML
3.38 INJECTION, POWDER, LYOPHILIZED, FOR SOLUTION INTRAVENOUS ONCE
Qty: 0 | Refills: 0 | Status: COMPLETED | OUTPATIENT
Start: 2018-08-31 | End: 2018-08-31

## 2018-08-31 RX ORDER — RIVAROXABAN 15 MG-20MG
20 KIT ORAL EVERY 24 HOURS
Qty: 0 | Refills: 0 | Status: DISCONTINUED | OUTPATIENT
Start: 2018-08-31 | End: 2018-09-04

## 2018-08-31 RX ORDER — ONDANSETRON 8 MG/1
4 TABLET, FILM COATED ORAL ONCE
Qty: 0 | Refills: 0 | Status: COMPLETED | OUTPATIENT
Start: 2018-08-31 | End: 2018-08-31

## 2018-08-31 RX ORDER — LACTOBACILLUS ACIDOPHILUS 100MM CELL
1 CAPSULE ORAL
Qty: 0 | Refills: 0 | Status: DISCONTINUED | OUTPATIENT
Start: 2018-08-31 | End: 2018-09-04

## 2018-08-31 RX ORDER — METOPROLOL TARTRATE 50 MG
25 TABLET ORAL
Qty: 0 | Refills: 0 | Status: DISCONTINUED | OUTPATIENT
Start: 2018-09-01 | End: 2018-09-02

## 2018-08-31 RX ORDER — ACETAMINOPHEN 500 MG
650 TABLET ORAL EVERY 6 HOURS
Qty: 0 | Refills: 0 | Status: DISCONTINUED | OUTPATIENT
Start: 2018-08-31 | End: 2018-09-04

## 2018-08-31 RX ORDER — CARVEDILOL PHOSPHATE 80 MG/1
3.12 CAPSULE, EXTENDED RELEASE ORAL ONCE
Qty: 0 | Refills: 0 | Status: DISCONTINUED | OUTPATIENT
Start: 2018-08-31 | End: 2018-08-31

## 2018-08-31 RX ORDER — SODIUM CHLORIDE 9 MG/ML
500 INJECTION INTRAMUSCULAR; INTRAVENOUS; SUBCUTANEOUS ONCE
Qty: 0 | Refills: 0 | Status: DISCONTINUED | OUTPATIENT
Start: 2018-08-31 | End: 2018-08-31

## 2018-08-31 RX ADMIN — SODIUM CHLORIDE 2600 MILLILITER(S): 9 INJECTION INTRAMUSCULAR; INTRAVENOUS; SUBCUTANEOUS at 21:05

## 2018-08-31 RX ADMIN — Medication 975 MILLIGRAM(S): at 20:59

## 2018-08-31 RX ADMIN — PIPERACILLIN AND TAZOBACTAM 200 GRAM(S): 4; .5 INJECTION, POWDER, LYOPHILIZED, FOR SOLUTION INTRAVENOUS at 20:59

## 2018-08-31 RX ADMIN — Medication 250 MILLIGRAM(S): at 21:47

## 2018-08-31 NOTE — H&P ADULT - PROBLEM SELECTOR PLAN 3
--recent diagnosis and stated chemo two days ago.   --Bilirubin and liver enzymes significantly improved from when he had obstructive symptoms s/p biliary stent placement. Currently denies abdominal pain, significant nausea or vomiting.   --Notify oncology of admission in the morning.   --Dr. Frederick to notify house GI in the morning.

## 2018-08-31 NOTE — H&P ADULT - PROBLEM SELECTOR PLAN 4
--s/p 5.5L paracentesis yesterday, did not receive albumin.  --If BP continues to be low after 3rd liter IVF will give albumin.   --unlikely to be SBP as likely malignant ascites.

## 2018-08-31 NOTE — ED ADULT NURSE NOTE - PSH
Carpal tunnel syndrome    H/O knee surgery    History of coronary artery stent placement    S/P abdominal paracentesis  aug 30, 2018

## 2018-08-31 NOTE — ED ADULT TRIAGE NOTE - CHIEF COMPLAINT QUOTE
hx pancreatic cancer (diagnosed aug 4, 2018), first chemo: x 2 days ago, s/p paracentesis yesterday, today with fever up to 101 at home (denies cough/dysuria)

## 2018-08-31 NOTE — ED ADULT NURSE NOTE - NSIMPLEMENTINTERV_GEN_ALL_ED
Implemented All Universal Safety Interventions:  [place] to call system. Call bell, personal items and telephone within reach. Instruct patient to call for assistance. Room bathroom lighting operational. Non-slip footwear when patient is off stretcher. Physically safe environment: no spills, clutter or unnecessary equipment. Stretcher in lowest position, wheels locked, appropriate side rails in place.

## 2018-08-31 NOTE — ED ADULT NURSE NOTE - OBJECTIVE STATEMENT
74y/o presented to the ED from home with complaint of fever. A&Ox3, ambulatory. [facility] Pancreatic cancer dx in august first chemo treatment Wednesday., A-fib- on Xarelto. Patient reports he developed a fever last night with increased fatigue. Highest fever 101 F at home. Upon Reports taking Tylenol at home- does not know if it helped. States he had a paracentesis yesterday ( no complications). Denies headache, chest pain, palpitations, N/V/D, abdominal pain, numbness, tingling, chills. Abdomen is soft, nondistended, nontender to palpation. Surgical site clean/dry/intact. Lung sounds diminished at bases bilaterally. Cap refill < 2 sec. EKG performed, patient placed on cardiac monitor.

## 2018-08-31 NOTE — H&P ADULT - HISTORY OF PRESENT ILLNESS
73M PMH recently diagnosed metastatic pancreatic adenocarcinoma s/p biliary stent (mets to liver, started Gemzar/abraxane 8/29/18), afib on xarelto, HTN presents with fever, generalized weakness, and poor appetite for one day. 73M PMH recently diagnosed metastatic pancreatic adenocarcinoma s/p biliary stent (mets to liver, started Gemzar/abraxane 8/29/18), afib on xarelto, HTN presents with fever, generalized weakness, and poor appetite for one day. Patient started chemo two days ago. Had paracentesis performed yesterday with 5500 cc removed. This morning, he felt significant malaise when he woke up. Per his wife, did not eat or drink anything today. Then developed fever up to 101 at home. Patient denies focal complaints besides poor appetite. No chest pain, SOB, abdominal pain, dizziness, abdominal pain, dysuria, hematuria, diarrhea, rashes. No sick contacts. Seemed to tolerate paracentesis yesterday, with mild improvement in abdominal bloating.

## 2018-08-31 NOTE — ED PROVIDER NOTE - MEDICAL DECISION MAKING DETAILS
SELAML: 72 y/o male recently diagnosed with pancreatic cancer, chemo 2 days ago, p/w fever started last night, today 101, had therapeutic paracentesis done yesterday, pt with no complaints except for decreased appetite, pt's exam: oropharnyx no lesions, no erythema, Lungs CTA b/l, Cardiac irregular tachycardic, Abd, soft, distended, nontender, nontender, clean paracentesis site, no rash, 1)CBC, CMP, VBG, Blood cx x 2, U/A, urine culture 2)EKG, CXR 3)IVF 4)antibiotics

## 2018-08-31 NOTE — ED PROVIDER NOTE - OBJECTIVE STATEMENT
72 y/o male PMHx Afib on xarelto, HTN, recently diagnosed Pancreatic Cancer presents w fever since last night, poor appetite today and feeling of generalized weakness today. His first chemo treatment was 2 days ago, he had a paracentesis yesterday.

## 2018-08-31 NOTE — H&P ADULT - NSHPREVIEWOFSYSTEMS_GEN_ALL_CORE
Review of Systems:   CONSTITUTIONAL: No fever, weight loss, or fatigue  EYES: No eye pain, visual disturbances, or discharge  ENMT:  No difficulty hearing, tinnitus, vertigo; No sinus or throat pain  NECK: No pain or stiffness  BREASTS: No pain, masses, or nipple discharge  RESPIRATORY: No cough, wheezing, chills or hemoptysis; No shortness of breath  CARDIOVASCULAR: No chest pain, palpitations, dizziness, or leg swelling  GASTROINTESTINAL: No abdominal or epigastric pain. No nausea, vomiting, or hematemesis; No diarrhea or constipation. No melena or hematochezia.  GENITOURINARY: No dysuria, frequency, hematuria, or incontinence  NEUROLOGICAL: No headaches, memory loss, loss of strength, numbness, or tremors  SKIN: No itching, burning, rashes, or lesions   LYMPH NODES: No enlarged glands  ENDOCRINE: No heat or cold intolerance; No hair loss  MUSCULOSKELETAL: No joint pain or swelling; No muscle, back, or extremity pain  PSYCHIATRIC: No depression, anxiety, mood swings, or difficulty sleeping  HEME/LYMPH: No easy bruising, or bleeding gums  ALLERY AND IMMUNOLOGIC: No hives or eczema  [ ] all other systems negative or as per HPI Review of Systems:   CONSTITUTIONAL: +FEVER, CHILLS, MALAISE; No weight loss  EYES: No eye pain, visual disturbances, or discharge  ENMT:  No difficulty hearing, tinnitus, vertigo; No sinus or throat pain  NECK: No pain or stiffness  BREASTS: No pain, masses, or nipple discharge  RESPIRATORY: No cough, wheezing, chills or hemoptysis; No shortness of breath  CARDIOVASCULAR: No chest pain, palpitations, dizziness, or leg swelling  GASTROINTESTINAL: +POOR PO INTAKE, NAUSEA; No abdominal or epigastric pain. No vomiting, or hematemesis; No diarrhea or constipation. No melena or hematochezia.  GENITOURINARY: No dysuria, frequency, hematuria, or incontinence  NEUROLOGICAL: No headaches, memory loss, loss of strength, numbness, or tremors  SKIN: No itching, burning, rashes, or lesions   LYMPH NODES: No enlarged glands  ENDOCRINE: No heat or cold intolerance; No hair loss  MUSCULOSKELETAL: No joint pain or swelling; No muscle, back, or extremity pain  PSYCHIATRIC: No depression, anxiety, mood swings, or difficulty sleeping  HEME/LYMPH: No easy bruising, or bleeding gums  ALLERY AND IMMUNOLOGIC: No hives or eczema  [x] all other systems negative or as per HPI

## 2018-08-31 NOTE — H&P ADULT - NSHPLABSRESULTS_GEN_ALL_CORE
EKG, labs, and imaging personally reviewed and interpreted.     EKG:     LABS:                        11.0   15.7  )-----------( 284      ( 31 Aug 2018 20:52 )             34.0     138  |  101  |  15  ----------------------------<  126<H>  3.7   |  24  |  0.83    Ca    8.1<L>      31 Aug 2018 20:52    TPro  5.8<L>  /  Alb  2.6<L>  /  TBili  5.3<H>  /  DBili  x   /  AST  35  /  ALT  24  /  AlkPhos  130<H>  08-31    PT/INR - ( 31 Aug 2018 20:52 )   PT: 15.4 sec;   INR: 1.42 ratio    PTT - ( 31 Aug 2018 20:52 )  PTT:30.5 sec    Urinalysis Basic - ( 31 Aug 2018 20:52 )    Color: Brown / Appearance: SL Turbid / S.026 / pH: x  Gluc: x / Ketone: Negative  / Bili: Small / Urobili: >8 mg/dL   Blood: x / Protein: 100 mg/dL / Nitrite: Negative   Leuk Esterase: Negative / RBC: 5-10 /HPF / WBC 5-10 /HPF   Sq Epi: x / Non Sq Epi: x / Bacteria: Few /HPF    RADIOLOGY & ADDITIONAL TESTS:  Imaging Personally Reviewed:    Consultant(s) Notes Reviewed:      Care Discussed with Consultants/Other Providers: EKG, labs, and imaging personally reviewed and interpreted.     EKG: afib RVR, , isolated TWI III, no other ischemic changes, QTc 475    LABS:                        11.0   15.7  )-----------( 284      ( 31 Aug 2018 20:52 )             34.0     138  |  101  |  15  ----------------------------<  126<H>  3.7   |  24  |  0.83    Ca    8.1<L>      31 Aug 2018 20:52    TPro  5.8<L>  /  Alb  2.6<L>  /  TBili  5.3<H>  /  DBili  x   /  AST  35  /  ALT  24  /  AlkPhos  130<H>  08    PT/INR - ( 31 Aug 2018 20:52 )   PT: 15.4 sec;   INR: 1.42 ratio    PTT - ( 31 Aug 2018 20:52 )  PTT:30.5 sec    Urinalysis Basic - ( 31 Aug 2018 20:52 )    Color: Brown / Appearance: SL Turbid / S.026 / pH: x  Gluc: x / Ketone: Negative  / Bili: Small / Urobili: >8 mg/dL   Blood: x / Protein: 100 mg/dL / Nitrite: Negative   Leuk Esterase: Negative / RBC: 5-10 /HPF / WBC 5-10 /HPF   Sq Epi: x / Non Sq Epi: x / Bacteria: Few /HPF    RADIOLOGY & ADDITIONAL TESTS:  Imaging Personally Reviewed: CT A/P pending     CXR: INTERPRETATION:  right basilar atelectasis; small left pleural effusion  Consultant(s) Notes Reviewed:  yes  Care Discussed with Consultants/Other Providers: yes

## 2018-08-31 NOTE — H&P ADULT - PROBLEM SELECTOR PLAN 5
--If BP continues to be low after 3rd liter IVF will give albumin.   --Patient with low-normal BP on last admission as well.   --Would recommend avoiding coreg in the future and substituting with metoprolol instead for afib rate control and less antihypertensive properties.

## 2018-08-31 NOTE — H&P ADULT - NSHPPHYSICALEXAM_GEN_ALL_CORE
Vital Signs Last 24 Hrs  T(C): 37.4 (08-31-18 @ 21:04)  T(F): 99.3 (08-31-18 @ 21:04), Max: 99.6 (08-31-18 @ 19:18)  HR: 121 (08-31-18 @ 22:32) (100 - 139)  BP: 100/67 (08-31-18 @ 22:32)  BP(mean): --  RR: 22 (08-31-18 @ 22:32) (22 - 24)  SpO2: 96% (08-31-18 @ 22:32) (95% - 96%)  Wt(kg): --    PHYSICAL EXAM:  GENERAL: NAD, well-developed  HEAD:  Atraumatic, Normocephalic  EYES: EOMI, PERRLA, conjunctiva and sclera clear  NECK: Supple, No JVD  CHEST/LUNG: Clear to auscultation bilaterally; No wheeze  HEART: Regular rate and rhythm; No murmurs, rubs, or gallops  ABDOMEN: Soft, Nontender, Nondistended; Bowel sounds present  EXTREMITIES:  2+ Peripheral Pulses, No clubbing, cyanosis, or edema  PSYCH: AAOx3  NEUROLOGY: non-focal  SKIN: No rashes or lesions Vital Signs Last 24 Hrs  T(C): 37.4 (08-31-18 @ 21:04)  T(F): 99.3 (08-31-18 @ 21:04), Max: 99.6 (08-31-18 @ 19:18)  HR: 121 (08-31-18 @ 22:32) (100 - 139)  BP: 100/67 (08-31-18 @ 22:32)  BP(mean): --  RR: 22 (08-31-18 @ 22:32) (22 - 24)  SpO2: 96% (08-31-18 @ 22:32) (95% - 96%)  Wt(kg): --    PHYSICAL EXAM:  GENERAL: NAD, well-developed, ill-appearing  HEAD:  Atraumatic, Normocephalic  EYES: EOMI, PERRLA, icteric sclerae  NECK: Supple, No JVD  CHEST/LUNG: Clear to auscultation bilaterally; No wheeze  HEART: tachycardia, irregular; No murmurs, rubs, or gallops  ABDOMEN: Soft, mildly distended, +fluid wave; RLQ paracentesis bandage clean; Nontender; Bowel sounds present  EXTREMITIES:  trace pedal edema; 2+ Peripheral Pulses, No clubbing, cyanosis  PSYCH: AAOx3  NEUROLOGY: non-focal  SKIN: Mild jaundice; Hot to touch; No rashes or lesions

## 2018-08-31 NOTE — ED PROVIDER NOTE - ATTENDING CONTRIBUTION TO CARE
Attending MD Doss;:   I personally have seen and examined this patient.  Physician assistant note reviewed and agree on plan of care and except where noted.  See MDM for details.

## 2018-08-31 NOTE — H&P ADULT - ASSESSMENT
73M PMH recently diagnosed metastatic pancreatic adenocarcinoma s/p biliary stent (mets to liver, started Gemzar/abraxane 8/29/18), afib on xarelto, HTN presents with fever, generalized weakness, and poor appetite for one day. Likely septic, however source unclear at this time. With near-hypotension and afib RVR, rate somewhat improved after IVF resuscitation.

## 2018-08-31 NOTE — H&P ADULT - NSHPSOCIALHISTORY_GEN_ALL_CORE
Former cigar smoker but only occasional (once every few months). [place] cigarettes briefly in [place] but did not smoke regularly. Rare social ETOH use. Former cigar smoker but only occasional (once every few months). T[place] cigarettes briefly in [place] but did not smoke regularly. Rare social ETOH use. Lives at home with wife.

## 2018-08-31 NOTE — H&P ADULT - PROBLEM SELECTOR PLAN 1
--Immunocompromised patient with fever, elevated HR, low BP, signs consistent with sepsis.  --source of infection unclear at this time. No focal symptoms, recent paracentesis, started chemo two days ago.   --UCx and BCx sent in the ED.  --CXR with small L pleural effusion (possibly from hypoalbuminemia), R base atelectasis, but likely related to compressive atelectasis from ascites.    --Started on vancomycin and zosyn, will continue broad spectrum antibiotics for now.   --Tylenol for fever.   --Lactate 2.2, s/p IVF resuscitation, will trend.  --hold off on RVP as patient without respiratory symptoms. No rhinorrhea, sore throat.  --CT A/P ordered by ED to eval for source of infection. Will follow up.

## 2018-08-31 NOTE — H&P ADULT - PROBLEM SELECTOR PLAN 2
--HR initially elevated in 130s-140s, now more consistently in 120s. Will continue with IVF boluses until improvement. Low dose metoprolol to prevent signifiant RVR, goal HR around 110. Hold coreg due to its more significant anti-hypertensive properties.   --no need for telemetry bed if HR with improvement in the ED.   --continue with xarelto for anticoagulation.

## 2018-09-01 PROBLEM — C25.9 MALIGNANT NEOPLASM OF PANCREAS, UNSPECIFIED: Chronic | Status: ACTIVE | Noted: 2018-08-31

## 2018-09-01 LAB
ALBUMIN SERPL ELPH-MCNC: 2.3 G/DL — LOW (ref 3.3–5)
ALP SERPL-CCNC: 110 U/L — SIGNIFICANT CHANGE UP (ref 40–120)
ALT FLD-CCNC: 23 U/L — SIGNIFICANT CHANGE UP (ref 10–45)
ANION GAP SERPL CALC-SCNC: 8 MMOL/L — SIGNIFICANT CHANGE UP (ref 5–17)
APTT BLD: 34.7 SEC — SIGNIFICANT CHANGE UP (ref 27.5–37.4)
AST SERPL-CCNC: 36 U/L — SIGNIFICANT CHANGE UP (ref 10–40)
BILIRUB SERPL-MCNC: 3.8 MG/DL — HIGH (ref 0.2–1.2)
BUN SERPL-MCNC: 12 MG/DL — SIGNIFICANT CHANGE UP (ref 7–23)
CALCIUM SERPL-MCNC: 8.1 MG/DL — LOW (ref 8.4–10.5)
CHLORIDE SERPL-SCNC: 104 MMOL/L — SIGNIFICANT CHANGE UP (ref 96–108)
CO2 SERPL-SCNC: 24 MMOL/L — SIGNIFICANT CHANGE UP (ref 22–31)
CREAT SERPL-MCNC: 0.75 MG/DL — SIGNIFICANT CHANGE UP (ref 0.5–1.3)
CULTURE RESULTS: NO GROWTH — SIGNIFICANT CHANGE UP
GLUCOSE SERPL-MCNC: 159 MG/DL — HIGH (ref 70–99)
HCT VFR BLD CALC: 28.8 % — LOW (ref 39–50)
HGB BLD-MCNC: 9.6 G/DL — LOW (ref 13–17)
INR BLD: 1.39 RATIO — HIGH (ref 0.88–1.16)
LACTATE BLDV-MCNC: 1.9 MMOL/L — SIGNIFICANT CHANGE UP (ref 0.7–2)
MCHC RBC-ENTMCNC: 33 PG — SIGNIFICANT CHANGE UP (ref 27–34)
MCHC RBC-ENTMCNC: 33.3 GM/DL — SIGNIFICANT CHANGE UP (ref 32–36)
MCV RBC AUTO: 99 FL — SIGNIFICANT CHANGE UP (ref 80–100)
PLATELET # BLD AUTO: 235 K/UL — SIGNIFICANT CHANGE UP (ref 150–400)
POTASSIUM SERPL-MCNC: 3.7 MMOL/L — SIGNIFICANT CHANGE UP (ref 3.5–5.3)
POTASSIUM SERPL-SCNC: 3.7 MMOL/L — SIGNIFICANT CHANGE UP (ref 3.5–5.3)
PROT SERPL-MCNC: 5.4 G/DL — LOW (ref 6–8.3)
PROTHROM AB SERPL-ACNC: 15.8 SEC — HIGH (ref 10–13.1)
RBC # BLD: 2.91 M/UL — LOW (ref 4.2–5.8)
RBC # FLD: 15.2 % — HIGH (ref 10.3–14.5)
SODIUM SERPL-SCNC: 136 MMOL/L — SIGNIFICANT CHANGE UP (ref 135–145)
SPECIMEN SOURCE: SIGNIFICANT CHANGE UP
WBC # BLD: 12.4 K/UL — HIGH (ref 3.8–10.5)
WBC # FLD AUTO: 12.4 K/UL — HIGH (ref 3.8–10.5)

## 2018-09-01 PROCEDURE — 99222 1ST HOSP IP/OBS MODERATE 55: CPT | Mod: GC

## 2018-09-01 PROCEDURE — 93010 ELECTROCARDIOGRAM REPORT: CPT

## 2018-09-01 PROCEDURE — 74177 CT ABD & PELVIS W/CONTRAST: CPT | Mod: 26

## 2018-09-01 RX ORDER — SODIUM CHLORIDE 9 MG/ML
500 INJECTION INTRAMUSCULAR; INTRAVENOUS; SUBCUTANEOUS ONCE
Qty: 0 | Refills: 0 | Status: COMPLETED | OUTPATIENT
Start: 2018-09-01 | End: 2018-09-01

## 2018-09-01 RX ORDER — SODIUM CHLORIDE 9 MG/ML
1000 INJECTION INTRAMUSCULAR; INTRAVENOUS; SUBCUTANEOUS
Qty: 0 | Refills: 0 | Status: DISCONTINUED | OUTPATIENT
Start: 2018-09-01 | End: 2018-09-04

## 2018-09-01 RX ORDER — ACETAMINOPHEN 500 MG
1000 TABLET ORAL ONCE
Qty: 0 | Refills: 0 | Status: COMPLETED | OUTPATIENT
Start: 2018-09-01 | End: 2018-09-01

## 2018-09-01 RX ADMIN — Medication 1 TABLET(S): at 10:05

## 2018-09-01 RX ADMIN — Medication 1 CAPSULE(S): at 22:20

## 2018-09-01 RX ADMIN — Medication 250 MILLIGRAM(S): at 09:06

## 2018-09-01 RX ADMIN — Medication 1 CAPSULE(S): at 06:24

## 2018-09-01 RX ADMIN — PIPERACILLIN AND TAZOBACTAM 25 GRAM(S): 4; .5 INJECTION, POWDER, LYOPHILIZED, FOR SOLUTION INTRAVENOUS at 22:20

## 2018-09-01 RX ADMIN — Medication 1 TABLET(S): at 13:35

## 2018-09-01 RX ADMIN — RIVAROXABAN 20 MILLIGRAM(S): KIT at 17:58

## 2018-09-01 RX ADMIN — Medication 25 MILLIGRAM(S): at 00:29

## 2018-09-01 RX ADMIN — PANTOPRAZOLE SODIUM 40 MILLIGRAM(S): 20 TABLET, DELAYED RELEASE ORAL at 06:24

## 2018-09-01 RX ADMIN — Medication 400 MILLIGRAM(S): at 22:36

## 2018-09-01 RX ADMIN — Medication 25 MILLIGRAM(S): at 04:19

## 2018-09-01 RX ADMIN — SODIUM CHLORIDE 1000 MILLILITER(S): 9 INJECTION, SOLUTION INTRAVENOUS at 00:09

## 2018-09-01 RX ADMIN — Medication 25 MILLIGRAM(S): at 18:00

## 2018-09-01 RX ADMIN — SODIUM CHLORIDE 500 MILLILITER(S): 9 INJECTION INTRAMUSCULAR; INTRAVENOUS; SUBCUTANEOUS at 22:32

## 2018-09-01 RX ADMIN — Medication 1 TABLET(S): at 18:00

## 2018-09-01 RX ADMIN — Medication 650 MILLIGRAM(S): at 20:41

## 2018-09-01 RX ADMIN — Medication 40 MILLIEQUIVALENT(S): at 00:29

## 2018-09-01 RX ADMIN — SODIUM CHLORIDE 75 MILLILITER(S): 9 INJECTION INTRAMUSCULAR; INTRAVENOUS; SUBCUTANEOUS at 09:05

## 2018-09-01 RX ADMIN — PIPERACILLIN AND TAZOBACTAM 25 GRAM(S): 4; .5 INJECTION, POWDER, LYOPHILIZED, FOR SOLUTION INTRAVENOUS at 13:35

## 2018-09-01 RX ADMIN — PIPERACILLIN AND TAZOBACTAM 25 GRAM(S): 4; .5 INJECTION, POWDER, LYOPHILIZED, FOR SOLUTION INTRAVENOUS at 06:24

## 2018-09-01 RX ADMIN — Medication 1 CAPSULE(S): at 13:35

## 2018-09-01 NOTE — CONSULT NOTE ADULT - SUBJECTIVE AND OBJECTIVE BOX
Chief Complaint:  Patient is a 73y old  Male who presents with a chief complaint of fever (31 Aug 2018 22:36)    HPI:  73 year old man with hx of metastatic pancreatic cancer s/p stent placement (18) [Started on Gemzar/Abraxane], Afib (on xarelto), hypertension, presents with fever, generalized weakness and decreased appetite x 1 day.       Allergies:  No Known Allergies      Home Medications:    Hospital Medications:  acetaminophen   Tablet 650 milliGRAM(s) Oral every 6 hours PRN  amylase/lipase/protease  (CREON 12,000 Units) 1 Capsule(s) Oral three times a day  lactobacillus acidophilus 1 Tablet(s) Oral three times a day with meals  metoclopramide Injectable 10 milliGRAM(s) IV Push every 8 hours PRN  metoprolol tartrate 25 milliGRAM(s) Oral two times a day  pantoprazole    Tablet 40 milliGRAM(s) Oral before breakfast  piperacillin/tazobactam IVPB. 3.375 Gram(s) IV Intermittent every 8 hours  rivaroxaban 20 milliGRAM(s) Oral every 24 hours  sodium chloride 0.9%. 1000 milliLiter(s) IV Continuous <Continuous>  vancomycin  IVPB 1000 milliGRAM(s) IV Intermittent every 12 hours      PMHX/PSHX:  Pancreatic cancer  HTN (hypertension)  Afib  History of coronary artery stent placement  Carpal tunnel syndrome  S/P abdominal paracentesis  H/O knee surgery      Family history:  Family history of liver cancer (Mother)      Social History:     ROS:   General:  No fevers, chills or night sweats.  Eyes:  No blurry vision or eye pain  ENT:  No sore throat or dysphagia  CV:  No pain or palpitations  Resp:  No dyspnea, cough, wheezing  GI:  No pain, No nausea, No vomiting, No diarrhea, No constipation, No weight loss, No pruritis, No rectal bleeding, No tarry stools  Neuro:  No weakness or tingling  Psych:  No fatigue, insomnia, mood problems, depression  Heme:  No petechiae or bruising  Skin:  No rash or edema      PHYSICAL EXAM:   GENERAL:  NAD, Appears stated age  HEENT:  NC/AT,  conjunctivae clear and pink, sclera -anicteric  CHEST:  CTA B/L, Normal effort  HEART:  RRR S1/S2, No murmurs  ABDOMEN:  Soft, non-tender, non-distended, normoactive bowel sounds,  no masses ,no hepato-splenomegaly, no signs of chronic liver disease  EXTEREMITIES:  No cyanosis or Edema  SKIN:  Warm & Dry. No rash or erythema  NEURO:  Alert, oriented    Vital Signs:  Vital Signs Last 24 Hrs  T(C): 37.3 (01 Sep 2018 02:53), Max: 37.6 (31 Aug 2018 19:18)  T(F): 99.1 (01 Sep 2018 02:53), Max: 99.6 (31 Aug 2018 19:18)  HR: 123 (01 Sep 2018 07:58) (100 - 139)  BP: 97/68 (01 Sep 2018 07:58) (97/68 - 110/70)  BP(mean): --  RR: 17 (01 Sep 2018 07:58) (17 - 24)  SpO2: 95% (01 Sep 2018 07:58) (94% - 96%)  Daily Height in cm: 172.72 (31 Aug 2018 19:18)    Daily     LABS:                        11.0   15.7  )-----------( 284      ( 31 Aug 2018 20:52 )             34.0     Mean Cell Volume: 100.0 fl (18 @ 20:52)        138  |  101  |  15  ----------------------------<  126<H>  3.7   |  24  |  0.83    Ca    8.1<L>      31 Aug 2018 20:52    TPro  5.8<L>  /  Alb  2.6<L>  /  TBili  5.3<H>  /  DBili  x   /  AST  35  /  ALT  24  /  AlkPhos  130<H>      LIVER FUNCTIONS - ( 31 Aug 2018 20:52 )  Alb: 2.6 g/dL / Pro: 5.8 g/dL / ALK PHOS: 130 U/L / ALT: 24 U/L / AST: 35 U/L / GGT: x           PT/INR - ( 31 Aug 2018 20:52 )   PT: 15.4 sec;   INR: 1.42 ratio         PTT - ( 31 Aug 2018 20:52 )  PTT:30.5 sec  Urinalysis Basic - ( 31 Aug 2018 20:52 )    Color: Brown / Appearance: SL Turbid / S.026 / pH: x  Gluc: x / Ketone: Negative  / Bili: Small / Urobili: >8 mg/dL   Blood: x / Protein: 100 mg/dL / Nitrite: Negative   Leuk Esterase: Negative / RBC: 5-10 /HPF / WBC 5-10 /HPF   Sq Epi: x / Non Sq Epi: x / Bacteria: Few /HPF                              11.0   15.7  )-----------( 284      ( 31 Aug 2018 20:52 )             34.0                         11.4   10.5  )-----------( 342      ( 30 Aug 2018 10:28 )             35.0     Imaging: Chief Complaint:  Patient is a 73y old  Male who presents with a chief complaint of fever (31 Aug 2018 22:36)    HPI:  73 year old man with hx of metastatic pancreatic cancer s/p stent placement (18) [Started on Gemzar/Abraxane], Afib (on xarelto), hypertension, presents with fever, generalized weakness and decreased appetite x 1 day. Patient reports after recent stent placement he was feeling relatively well. He developed worsening abdominal distention with early satiety over the last few weeks. Three days ago (), he began Gemzar/Abraxane for his pancreatic ca followed by a paracentesis (5500cc) the following day. One day prior to admission he noted fevers (Tmax 101) and generalized weakness prompting him to come to the hospital. Reports since coming to the ED, he now longer feels feverish and feels better. Denies chills, chest pain, abdominal pain, shortness of breath, nausea, vomiting, diarrhea, constipation, headache, cough, blurry vision and dizziness.     ED Course:  VS: Afebrile, HR: 100, BP: 102/59, RR: 22, O2 sat: 95 % on RA  Given Vanco, Zosyn, 2.6L NS Bolus, 1L LR Bolus, Tylenol and Zofran    Allergies:  No Known Allergies    Home Medications:  pantoprazole 40 mg oral delayed release tablet  Creon 12,000 units oral delayed release capsule  Reglan 5 mg oral tablet  carvedilol 6.25 mg oral tablet:    Xarelto 20 mg oral tablet    Hospital Medications:  acetaminophen   Tablet 650 milliGRAM(s) Oral every 6 hours PRN  amylase/lipase/protease  (CREON 12,000 Units) 1 Capsule(s) Oral three times a day  lactobacillus acidophilus 1 Tablet(s) Oral three times a day with meals  metoclopramide Injectable 10 milliGRAM(s) IV Push every 8 hours PRN  metoprolol tartrate 25 milliGRAM(s) Oral two times a day  pantoprazole    Tablet 40 milliGRAM(s) Oral before breakfast  piperacillin/tazobactam IVPB. 3.375 Gram(s) IV Intermittent every 8 hours  rivaroxaban 20 milliGRAM(s) Oral every 24 hours  sodium chloride 0.9%. 1000 milliLiter(s) IV Continuous <Continuous>  vancomycin  IVPB 1000 milliGRAM(s) IV Intermittent every 12 hours    PMHX/PSHX:  Pancreatic cancer  HTN (hypertension)  Afib  History of coronary artery stent placement  Carpal tunnel syndrome  S/P abdominal paracentesis  H/O knee surgery    Family history:  Family history of liver cancer (Mother)    Social History:   Former cigar smoker  Rare EtoH use  No drug use  Lives with wife    ROS:   General:  + fevers, No chills or night sweats.  ENT:  No sore throat or dysphagia  CV:  No pain or palpitations  Resp:  No dyspnea, cough, wheezing  GI:  No pain, No nausea, No vomiting, No diarrhea, No constipation, No weight loss, No pruritis, No rectal bleeding, No tarry stools  Heme:  No petechiae or bruising  Skin:  No rash or edema    PHYSICAL EXAM:   GENERAL:  NAD, Appears stated age  HEENT:  NC/AT,  PERRLA  CHEST:  CTA B/L, Normal effort  HEART:  RRR S1/S2, No murmurs  ABDOMEN:  Soft, non-tender, distended, + fluid wave, normoactive bowel sounds,  no hepatomegaly  EXTEREMITIES:  No cyanosis or Edema  SKIN:  Warm & Dry. No rash or erythema  NEURO:  Alert, oriented    Vital Signs:  Vital Signs Last 24 Hrs  T(C): 37.3 (01 Sep 2018 02:53), Max: 37.6 (31 Aug 2018 19:18)  T(F): 99.1 (01 Sep 2018 02:53), Max: 99.6 (31 Aug 2018 19:18)  HR: 123 (01 Sep 2018 07:58) (100 - 139)  BP: 97/68 (01 Sep 2018 07:58) (97/68 - 110/70)  BP(mean): --  RR: 17 (01 Sep 2018 07:58) (17 - 24)  SpO2: 95% (01 Sep 2018 07:58) (94% - 96%)  Daily Height in cm: 172.72 (31 Aug 2018 19:18)    Daily     LABS:                        11.0   15.7  )-----------( 284      ( 31 Aug 2018 20:52 )             34.0     Mean Cell Volume: 100.0 fl (-18 @ 20:52)        138  |  101  |  15  ----------------------------<  126<H>  3.7   |  24  |  0.83    Ca    8.1<L>      31 Aug 2018 20:52    TPro  5.8<L>  /  Alb  2.6<L>  /  TBili  5.3<H>  /  DBili  x   /  AST  35  /  ALT  24  /  AlkPhos  130<H>  08    LIVER FUNCTIONS - ( 31 Aug 2018 20:52 )  Alb: 2.6 g/dL / Pro: 5.8 g/dL / ALK PHOS: 130 U/L / ALT: 24 U/L / AST: 35 U/L / GGT: x           PT/INR - ( 31 Aug 2018 20:52 )   PT: 15.4 sec;   INR: 1.42 ratio       PTT - ( 31 Aug 2018 20:52 )  PTT:30.5 sec  Urinalysis Basic - ( 31 Aug 2018 20:52 )    Color: Brown / Appearance: SL Turbid / S.026 / pH: x  Gluc: x / Ketone: Negative  / Bili: Small / Urobili: >8 mg/dL   Blood: x / Protein: 100 mg/dL / Nitrite: Negative   Leuk Esterase: Negative / RBC: 5-10 /HPF / WBC 5-10 /HPF   Sq Epi: x / Non Sq Epi: x / Bacteria: Few /HPF

## 2018-09-01 NOTE — CONSULT NOTE ADULT - SUBJECTIVE AND OBJECTIVE BOX
HPI:  73M PMH recently diagnosed metastatic pancreatic adenocarcinoma s/p biliary stent (mets to liver, started Gemzar/abraxane 18), afib on xarelto, HTN presents with fever, generalized weakness, and poor appetite for one day. Patient started chemo two days ago. Had paracentesis performed yesterday with 5500 cc removed. This morning, he felt significant malaise when he woke up. Per his wife, did not eat or drink anything today. Then developed fever up to 101 at home. Patient denies focal complaints besides poor appetite. No chest pain, SOB, abdominal pain, dizziness, abdominal pain, dysuria, hematuria, diarrhea, rashes. No sick contacts. Seemed to tolerate paracentesis yesterday, with mild improvement in abdominal bloating. (31 Aug 2018 22:36)      PAST MEDICAL & SURGICAL HISTORY:  Pancreatic cancer: metastatic to liver  HTN (hypertension)  Afib  History of coronary artery stent placement  Carpal tunnel syndrome  S/P abdominal paracentesis: aug 30, 2018  H/O knee surgery      Allergies  No Known Allergies        ANTIMICROBIALS:  piperacillin/tazobactam IVPB. 3.375 every 8 hours  vancomycin  IVPB 1000 every 12 hours      OTHER MEDS: MEDICATIONS  (STANDING):  acetaminophen   Tablet 650 every 6 hours PRN  amylase/lipase/protease  (CREON 12,000 Units) 1 three times a day  metoclopramide Injectable 10 every 8 hours PRN  metoprolol tartrate 25 two times a day  pantoprazole    Tablet 40 before breakfast  rivaroxaban 20 every 24 hours      SOCIAL HISTORY:  [ ] etoh [ ] tobacco [x ] former smoker [ ] IVDU    FAMILY HISTORY:  Family history of liver cancer (Mother): In mother,  in her 50&#x27;s      REVIEW OF SYSTEMS  [  ] ROS unobtainable because:    [ x ] All other systems negative except as noted below:	    Constitutional:  [ x] fever [ ] chills  [ ] weight loss  [ x] weakness  Skin:  [ ] rash [ ] phlebitis	  Eyes: [ ] icterus [ ] pain  [ ] discharge	  ENMT: [ ] sore throat  [ ] thrush [ ] ulcers [ ] exudates  Respiratory: [ ] dyspnea [ ] hemoptysis [ ] cough [ ] sputum	  Cardiovascular:  [ ] chest pain [ ] palpitations [ ] edema	  Gastrointestinal:  [ ] nausea [ ] vomiting [ ] diarrhea [ ] constipation [ ] pain	  Genitourinary:  [ ] dysuria [ ] frequency [ ] hematuria [ ] discharge [ ] flank pain  [ ] incontinence  Musculoskeletal:  [ ] myalgias [ ] arthralgias [ ] arthritis  [ ] back pain  Neurological:  [ ] headache [ ] seizures  [ ] confusion/altered mental status  Psychiatric:  [ ] anxiety [ ] depression	  Hematology/Lymphatics:  [ ] lymphadenopathy  Endocrine:  [ ] adrenal [ ] thyroid  Allergic/Immunologic:	 [ ] transplant [ ] seasonal    Vital Signs Last 24 Hrs  T(F): 99.1 (18 @ 02:53), Max: 99.6 (18 @ 19:18)    Vital Signs Last 24 Hrs  HR: 123 (18 @ 07:58) (100 - 139)  BP: 97/68 (18 @ 07:58) (97/68 - 110/70)  RR: 17 (18 @ 07:58)  SpO2: 95% (18 @ 07:58) (94% - 96%)  Wt(kg): --    PHYSICAL EXAM:  General: non-toxic  HEAD/EYES: anicteric, PERRL  ENT:  supple  Cardiovascular:   S1, S2  Respiratory:  clear bilaterally  GI:  soft, non-tender, normal bowel sounds  :  no CVA tenderness   Musculoskeletal:  no synovitis  Neurologic:  grossly non-focal  Skin:  no rash  Lymph: no lymphadenopathy  Psychiatric:  appropriate affect  Vascular:  no phlebitis                                11.0   15.7  )-----------( 284      ( 31 Aug 2018 20:52 )             34.0           138  |  101  |  15  ----------------------------<  126<H>  3.7   |  24  |  0.83    Ca    8.1<L>      31 Aug 2018 20:52    TPro  5.8<L>  /  Alb  2.6<L>  /  TBili  5.3<H>  /  DBili  x   /  AST  35  /  ALT  24  /  AlkPhos  130<H>        Urinalysis Basic - ( 31 Aug 2018 20:52 )    Color: Brown / Appearance: SL Turbid / S.026 / pH: x  Gluc: x / Ketone: Negative  / Bili: Small / Urobili: >8 mg/dL   Blood: x / Protein: 100 mg/dL / Nitrite: Negative   Leuk Esterase: Negative / RBC: 5-10 /HPF / WBC 5-10 /HPF   Sq Epi: x / Non Sq Epi: x / Bacteria: Few /HPF        MICROBIOLOGY:          v            RADIOLOGY: < from: Xray Chest 1 View- PORTABLE-Routine (18 @ 21:40) >  PROCEDURE DATE:  2018      ******PRELIMINARY REPORT******    ******PRELIMINARY REPORT******              INTERPRETATION:  right basilar atelectasis; small left pleural effusion              ******PRELIMINARY REPORT******    ******PRELIMINARY REPORT******          EUGENIO ALMANZAR M.D., RADIOLOGY RESIDENT    < end of copied text >    < from: US Paracentesis (18 @ 13:37) >  Findings: Patient was counseled and informed consent was obtained.     Preprocedure ultrasound confirmed presence of a  large volume of ascites.     A site in the right lower quadrant was chosen and the overlying skin was   sterilely prepped and draped. Local anesthesia was provided with 1%   lidocaine. Under direct ultrasound guidance a 5 [place] catheter was   placed within the peritoneal cavity.    Free drainage of clear yellow fluid ensued. 5500 cc of fluid was   aspirated and subsequently drain was removed.            Patient tolerated the procedure well and left the department in stable   condition.    Impression: Therapeutic and diagnostic paracentesis.      Specimen sent for cytology.    < end of copied text > HPI:  73M PMH recently diagnosed metastatic pancreatic adenocarcinoma s/p biliary stent (mets to liver, started Gemzar/abraxane 18), afib on xarelto, HTN presents with fever, generalized weakness, and poor appetite for one day. Patient started chemo two days ago. Had paracentesis performed yesterday with 5500 cc removed. This morning, he felt significant malaise when he woke up. Per his wife, did not eat or drink anything today. Then developed fever up to 101 at home. Patient denies focal complaints besides poor appetite. No chest pain, SOB, abdominal pain, dizziness, abdominal pain, dysuria, hematuria, diarrhea, rashes. No sick contacts. Seemed to tolerate paracentesis yesterday, with mild improvement in abdominal bloating. (31 Aug 2018 22:36)    ID note-above reviewed. Patient only c/o weakness and feeling warm 1 day prior to admission. Denies abd pain, cough sob, diarrhea dysuria. Had abdominal discomfort prior to paracentesis because of the extra weight but no pain.   No sick contacts pets or travel    PAST MEDICAL & SURGICAL HISTORY:  Pancreatic cancer: metastatic to liver  HTN (hypertension)  Afib  History of coronary artery stent placement  Carpal tunnel syndrome  S/P abdominal paracentesis: aug 30, 2018  H/O knee surgery      Allergies  No Known Allergies        ANTIMICROBIALS:  piperacillin/tazobactam IVPB. 3.375 every 8 hours  vancomycin  IVPB 1000 every 12 hours      OTHER MEDS: MEDICATIONS  (STANDING):  acetaminophen   Tablet 650 every 6 hours PRN  amylase/lipase/protease  (CREON 12,000 Units) 1 three times a day  metoclopramide Injectable 10 every 8 hours PRN  metoprolol tartrate 25 two times a day  pantoprazole    Tablet 40 before breakfast  rivaroxaban 20 every 24 hours      SOCIAL HISTORY:  [ ] etoh [ ] tobacco [x ] former smoker [ ] IVDU    FAMILY HISTORY:  Family history of liver cancer (Mother): In mother,  in her 50&#x27;s      REVIEW OF SYSTEMS  [  ] ROS unobtainable because:    [ x ] All other systems negative except as noted below:	    Constitutional:  [ x] fever [ ] chills  [ ] weight loss  [ x] weakness  Skin:  [ ] rash [ ] phlebitis	  Eyes: [ ] icterus [ ] pain  [ ] discharge	  ENMT: [ ] sore throat  [ ] thrush [ ] ulcers [ ] exudates  Respiratory: [ ] dyspnea [ ] hemoptysis [ ] cough [ ] sputum	  Cardiovascular:  [ ] chest pain [ ] palpitations [ ] edema	  Gastrointestinal:  [ ] nausea [ ] vomiting [ ] diarrhea [ ] constipation [ ] pain	  Genitourinary:  [ ] dysuria [ ] frequency [ ] hematuria [ ] discharge [ ] flank pain  [ ] incontinence  Musculoskeletal:  [ ] myalgias [ ] arthralgias [ ] arthritis  [ ] back pain  Neurological:  [ ] headache [ ] seizures  [ ] confusion/altered mental status  Psychiatric:  [ ] anxiety [ ] depression	  Hematology/Lymphatics:  [ ] lymphadenopathy  Endocrine:  [ ] adrenal [ ] thyroid  Allergic/Immunologic:	 [ ] transplant [ ] seasonal    Vital Signs Last 24 Hrs  T(F): 99.1 (18 @ 02:53), Max: 99.6 (18 @ 19:18)    Vital Signs Last 24 Hrs  HR: 123 (18 @ 07:58) (100 - 139)  BP: 97/68 (18 @ 07:58) (97/68 - 110/70)  RR: 17 (18 @ 07:58)  SpO2: 95% (18 @ 07:58) (94% - 96%)  Wt(kg): --    PHYSICAL EXAM:  General: non-toxic, pallor +, mild scleral icterus  HEAD/EYES: anicteric, PERRL  ENT:  supple  Cardiovascular:   S1, S2  Respiratory:  inspiratory crackles at bases  GI:  soft, distended non-tender, normal bowel sounds  :  no CVA tenderness   Musculoskeletal:  no synovitis  Neurologic:  grossly non-focal  Skin:  no rash  Lymph: no lymphadenopathy  Psychiatric:  appropriate affect  Vascular:  no phlebitis                                11.0   15.7  )-----------( 284      ( 31 Aug 2018 20:52 )             34.0           138  |  101  |  15  ----------------------------<  126<H>  3.7   |  24  |  0.83    Ca    8.1<L>      31 Aug 2018 20:52    TPro  5.8<L>  /  Alb  2.6<L>  /  TBili  5.3<H>  /  DBili  x   /  AST  35  /  ALT  24  /  AlkPhos  130<H>        Urinalysis Basic - ( 31 Aug 2018 20:52 )    Color: Brown / Appearance: SL Turbid / S.026 / pH: x  Gluc: x / Ketone: Negative  / Bili: Small / Urobili: >8 mg/dL   Blood: x / Protein: 100 mg/dL / Nitrite: Negative   Leuk Esterase: Negative / RBC: 5-10 /HPF / WBC 5-10 /HPF   Sq Epi: x / Non Sq Epi: x / Bacteria: Few /HPF        MICROBIOLOGY:          v            RADIOLOGY: < from: Xray Chest 1 View- PORTABLE-Routine (18 @ 21:40) >  PROCEDURE DATE:  2018      ******PRELIMINARY REPORT******    ******PRELIMINARY REPORT******              INTERPRETATION:  right basilar atelectasis; small left pleural effusion              ******PRELIMINARY REPORT******    ******PRELIMINARY REPORT******          EUGENIO ALMANZAR M.D., RADIOLOGY RESIDENT    < end of copied text >    < from: US Paracentesis (18 @ 13:37) >  Findings: Patient was counseled and informed consent was obtained.     Preprocedure ultrasound confirmed presence of a  large volume of ascites.     A site in the right lower quadrant was chosen and the overlying skin was   sterilely prepped and draped. Local anesthesia was provided with 1%   lidocaine. Under direct ultrasound guidance a 5 [place] catheter was   placed within the peritoneal cavity.    Free drainage of clear yellow fluid ensued. 5500 cc of fluid was   aspirated and subsequently drain was removed.            Patient tolerated the procedure well and left the department in stable   condition.    Impression: Therapeutic and diagnostic paracentesis.      Specimen sent for cytology.    < end of copied text > HPI:  73M PMH recently diagnosed metastatic pancreatic adenocarcinoma s/p biliary stent (mets to liver, started Gemzar/abraxane 18), afib on xarelto, HTN presents with fever, generalized weakness, and poor appetite for one day. Patient started chemo two days ago. Had paracentesis performed yesterday with 5500 cc removed. This morning, he felt significant malaise when he woke up. Per his wife, did not eat or drink anything today. Then developed fever up to 101 at home. Patient denies focal complaints besides poor appetite. No chest pain, SOB, abdominal pain, dizziness, abdominal pain, dysuria, hematuria, diarrhea, rashes. No sick contacts. Seemed to tolerate paracentesis yesterday, with mild improvement in abdominal bloating. (31 Aug 2018 22:36)    ID note-above reviewed. Patient only c/o weakness and feeling warm 1 day prior to admission. Denies abd pain, cough sob, diarrhea dysuria. Had abdominal discomfort prior to paracentesis because of the extra weight but no pain.   No sick contacts pets or travel    PAST MEDICAL & SURGICAL HISTORY:  Pancreatic cancer: metastatic to liver  HTN (hypertension)  Afib  History of coronary artery stent placement  Carpal tunnel syndrome  S/P abdominal paracentesis: aug 30, 2018  H/O knee surgery      Allergies  No Known Allergies        ANTIMICROBIALS:  piperacillin/tazobactam IVPB. 3.375 every 8 hours  vancomycin  IVPB 1000 every 12 hours      OTHER MEDS: MEDICATIONS  (STANDING):  acetaminophen   Tablet 650 every 6 hours PRN  amylase/lipase/protease  (CREON 12,000 Units) 1 three times a day  metoclopramide Injectable 10 every 8 hours PRN  metoprolol tartrate 25 two times a day  pantoprazole    Tablet 40 before breakfast  rivaroxaban 20 every 24 hours      SOCIAL HISTORY:  , former smoker, no alcohol or drug abuse    FAMILY HISTORY:  Family history of liver cancer (Mother): In mother,  in her 50&#x27;s      REVIEW OF SYSTEMS  [  ] ROS unobtainable because:    [ x ] All other systems negative except as noted below:	    Constitutional:  [ x] fever [ ] chills  [ ] weight loss  [ x] weakness  Skin:  [ ] rash [ ] phlebitis	  Eyes: [ ] icterus [ ] pain  [ ] discharge	  ENMT: [ ] sore throat  [ ] thrush [ ] ulcers [ ] exudates  Respiratory: [ ] dyspnea [ ] hemoptysis [ ] cough [ ] sputum	  Cardiovascular:  [ ] chest pain [ ] palpitations [ ] edema	  Gastrointestinal:  [ ] nausea [ ] vomiting [ ] diarrhea [ ] constipation [ ] pain	  Genitourinary:  [ ] dysuria [ ] frequency [ ] hematuria [ ] discharge [ ] flank pain  [ ] incontinence  Musculoskeletal:  [ ] myalgias [ ] arthralgias [ ] arthritis  [ ] back pain  Neurological:  [ ] headache [ ] seizures  [ ] confusion/altered mental status  Psychiatric:  [ ] anxiety [ ] depression	  Hematology/Lymphatics:  [ ] lymphadenopathy  Endocrine:  [ ] adrenal [ ] thyroid  Allergic/Immunologic:	 [ ] transplant [ ] seasonal    Vital Signs Last 24 Hrs  T(F): 99.1 (18 @ 02:53), Max: 99.6 (18 @ 19:18)    Vital Signs Last 24 Hrs  HR: 123 (18 @ 07:58) (100 - 139)  BP: 97/68 (18 @ 07:58) (97/68 - 110/70)  RR: 17 (18 @ 07:58)  SpO2: 95% (18 @ 07:58) (94% - 96%)  Wt(kg): --    PHYSICAL EXAM:  General: non-toxic, pallor +, mild scleral icterus  HEAD/EYES: anicteric, PERRL  ENT:  supple  Cardiovascular:   S1, S2  Respiratory:  inspiratory crackles at bases  GI:  soft, distended non-tender, normal bowel sounds  :  no CVA tenderness   Musculoskeletal:  no synovitis  Neurologic:  grossly non-focal  Skin:  no rash  Lymph: no lymphadenopathy  Psychiatric:  appropriate affect  Vascular:  no phlebitis                                11.0   15.7  )-----------( 284      ( 31 Aug 2018 20:52 )             34.0           138  |  101  |  15  ----------------------------<  126<H>  3.7   |  24  |  0.83    Ca    8.1<L>      31 Aug 2018 20:52    TPro  5.8<L>  /  Alb  2.6<L>  /  TBili  5.3<H>  /  DBili  x   /  AST  35  /  ALT  24  /  AlkPhos  130<H>        Urinalysis Basic - ( 31 Aug 2018 20:52 )    Color: Brown / Appearance: SL Turbid / S.026 / pH: x  Gluc: x / Ketone: Negative  / Bili: Small / Urobili: >8 mg/dL   Blood: x / Protein: 100 mg/dL / Nitrite: Negative   Leuk Esterase: Negative / RBC: 5-10 /HPF / WBC 5-10 /HPF   Sq Epi: x / Non Sq Epi: x / Bacteria: Few /HPF        MICROBIOLOGY:          v            RADIOLOGY: < from: Xray Chest 1 View- PORTABLE-Routine (18 @ 21:40) >  PROCEDURE DATE:  2018      ******PRELIMINARY REPORT******    ******PRELIMINARY REPORT******              INTERPRETATION:  right basilar atelectasis; small left pleural effusion              ******PRELIMINARY REPORT******    ******PRELIMINARY REPORT******          EUGENIO ALMANZAR M.D., RADIOLOGY RESIDENT    < end of copied text >    < from: US Paracentesis (18 @ 13:37) >  Findings: Patient was counseled and informed consent was obtained.     Preprocedure ultrasound confirmed presence of a  large volume of ascites.     A site in the right lower quadrant was chosen and the overlying skin was   sterilely prepped and draped. Local anesthesia was provided with 1%   lidocaine. Under direct ultrasound guidance a 5 [place] catheter was   placed within the peritoneal cavity.    Free drainage of clear yellow fluid ensued. 5500 cc of fluid was   aspirated and subsequently drain was removed.            Patient tolerated the procedure well and left the department in stable   condition.    Impression: Therapeutic and diagnostic paracentesis.      Specimen sent for cytology.    < end of copied text >

## 2018-09-01 NOTE — CONSULT NOTE ADULT - ATTENDING COMMENTS
This patient has recently received Abraxane and gemcitabine chemotherapy. He has had a recent removal of peritoneal fluid by interventional radiology. Now he is admited for evaluation of fever. He has no localizing findings on pysical examination and he is not neutropenic. Agree with recommendation of ID consulation

## 2018-09-01 NOTE — CONSULT NOTE ADULT - ASSESSMENT
74 yo M, Afib, recently dxed met pancreatic ca (Ct as reviewed, hepatic mets, peritoneal carcinomatosis, malignant ascites, thrombosis of portal vein), s/p first cycle of chemo, admitted post large volume paracentesis, with fever.  Tachycardia noted, otherwise pt is alert, cooperative, nontoxic.  Afebrile here.  WBC minimally elevated.  Bili on continued decline from peak of 25, but he could still be at risk for cholangitis.  No clues to source of infection otherwise.  Extensive underlying malignancy could easily explain all findings as well.    Plan:  Await Cx results  Continue Zosyn for any ? of biliary sepsis; hope to limit course if Cxs negative  No need for Vanco as yet- will d/c  D/w pt and family at length

## 2018-09-01 NOTE — CHART NOTE - NSCHARTNOTEFT_GEN_A_CORE
73M PMH recently diagnosed metastatic pancreatic adenocarcinoma s/p biliary stent (mets to liver, started Gemzar/abraxane 8/29/18), afib on xarelto, HTN presents with fever, generalized weakness, and poor appetite for one day. Likely septic, however source unclear at this time. With near-hypotension and afib RVR, rate somewhat improved after IVF resuscitation.       Fever. --Immunocompromised patient with fever, elevated HR, low BP, signs consistent with sepsis.  --source of infection unclear at this time. No focal symptoms, recent paracentesis, started chemo two days ago.   --UCx and BCx result pending   --CXR with small L pleural effusion (possibly from hypoalbuminemia), R base atelectasis, but likely related to compressive atelectasis from ascites.    --Started on vancomycin and zosyn, will continue broad spectrum antibiotics for now.   --Tylenol for fever.   --Lactate 2.2, s/p IVF resuscitation  --CT A/P pending    -- ID evaluation called Notified by RN that patient had 23 beats of wide complex tachycardia, asymptomatic. seen and examined the patient. Patient lying in bed, in no distress. Denies HA, dizziness, palpitations, fever, chills, CP/SOB, N/V/D, dysuria, hematochezia, melena.    Vital Signs Last 24 Hrs  T(C): 38.3 (01 Sep 2018 21:58), Max: 38.3 (01 Sep 2018 21:58)  T(F): 101 (01 Sep 2018 21:58), Max: 101 (01 Sep 2018 21:58)  HR: 133 (01 Sep 2018 21:58) (120 - 154)  BP: 89/58 (01 Sep 2018 21:58) (89/58 - 109/70)  BP(mean): --  RR: 17 (01 Sep 2018 21:45) (16 - 20)  SpO2: 95% (01 Sep 2018 21:45) (94% - 95%)      Labs:                          9.6    12.40 )-----------( 235      ( 01 Sep 2018 10:27 )             28.8     09-01    136  |  104  |  12  ----------------------------<  159<H>  3.7   |  24  |  0.75    Ca    8.1<L>      01 Sep 2018 09:53    TPro  5.4<L>  /  Alb  2.3<L>  /  TBili  3.8<H>  /  DBili  x   /  AST  36  /  ALT  23  /  AlkPhos  110  09-01      Radiology:< from: Xray Chest 1 View- PORTABLE-Routine (08.31.18 @ 21:40) >    The mediastinal cardiac silhouette is unremarkable.    Low lung volumes. Blunting of the bilateral costophrenic angles question   small effusions and/or atelectasis. The lungs are otherwise clear.    MEDICATIONS  (STANDING):  amylase/lipase/protease  (CREON 12,000 Units) 1 Capsule(s) Oral three times a day  lactobacillus acidophilus 1 Tablet(s) Oral three times a day with meals  metoprolol tartrate 25 milliGRAM(s) Oral two times a day  pantoprazole    Tablet 40 milliGRAM(s) Oral before breakfast  piperacillin/tazobactam IVPB. 3.375 Gram(s) IV Intermittent every 8 hours  rivaroxaban 20 milliGRAM(s) Oral every 24 hours  sodium chloride 0.9%. 1000 milliLiter(s) (75 mL/Hr) IV Continuous <Continuous>    MEDICATIONS  (PRN):  acetaminophen   Tablet 650 milliGRAM(s) Oral every 6 hours PRN For Temp greater than 38 C (100.4 F)  metoclopramide Injectable 10 milliGRAM(s) IV Push every 8 hours PRN nausea and vomiting     Past Medical History:  Afib    HTN (hypertension)    Pancreatic cancer  metastatic to liver.     Past Surgical History:  Carpal tunnel syndrome    H/O knee surgery    History of coronary artery stent placement    S/P abdominal paracentesis  aug 30, 2    Review of Systems:   CONSTITUTIONAL: +FEVER, CHILLS, MALAISE  EYES: No eye pain, visual disturbances, or discharge  ENMT:  No difficulty hearing, tinnitus, vertigo; No sinus or throat pain  NECK: No pain or stiffness  BREASTS: No pain, masses, or nipple discharge  RESPIRATORY: No cough, wheezing, chills or hemoptysis; No shortness of breath  CARDIOVASCULAR: No chest pain, palpitations, dizziness, or leg swelling  GASTROINTESTINAL: +POOR PO INTAKE, NAUSEA; No abdominal or epigastric pain. No vomiting, or hematemesis; No diarrhea or constipation. No melena or hematochezia.  GENITOURINARY: No dysuria, frequency, hematuria, or incontinence  NEUROLOGICAL: No headaches, memory loss, loss of strength, numbness, or tremors  SKIN: WARM, flushed  LYMPH NODES: No enlarged glands  ENDOCRINE: No heat or cold intolerance; No hair loss  MUSCULOSKELETAL: No joint pain or swelling; No muscle, back, or extremity pain  PSYCHIATRIC: No depression, anxiety, mood swings, or difficulty sleeping  HEME/LYMPH: No easy bruising, or bleeding gums  ALLERY AND IMMUNOLOGIC: No hives or eczema   all other systems negative or as per HPI    Other Review of Systems: All other review of systems negative, except as noted in HPI       Physical Exam:  General: fatigued, non toxic appearance  Neurology: A&Ox3, nonfocal  Respiratory: CTA B/L  CV: IRRR, S1S2  Abdominal: Soft, NT, ND   MSK: No edema, + peripheral pulses, FROM all 4 extremity    Assessment & Plan:    73M PMH recently diagnosed metastatic pancreatic adenocarcinoma s/p biliary stent (mets to liver, afib on xarelto, HTN presents with fever, generalized weakness, and poor appetite for one day. Admitted for sepsis, unclear source, Afib with RVR , hypotension, fever, s/p Iv fluids resuscitation.  Now patient with fever of 101F, patient had 100.6 F fever earlier, received Tylenol,  still febrile, AFib with RVR on EKG HR in 120's to 140's. Patient otherwise asymptomatic.    Afib with RVR/ tachycardia. manual BP 89/58 mm of hg, febrile, likely tachycardia from fever,  12 lead EKG: Afib with RVR  NS 500ml IV bolus x1  If patient remains tachycardia s/p Iv bolus, consider Metoprolol IV  Continue BB, hold for BP < 100, Hr<60  Continue maintenance iv fluids  cardiology consult in am with house cardiology  Discussed with Attending    Fever/Sepsis: Patient warm, flushed, Tylenol 1000mg IV one dose now  Iv fluid bolus  Continue Zosyn  F/U Bcx results( Sent on 08/31/2018)  F/U with ID in am  Discussed with Attending  Will follow closely  Will update with primary team    Total time spent with the patient: 25 minutes    Jese Carlson [facility] [place]  [id number] Notified by RN that patient is tachycardiac with HR in 120 to n140's,  seen and examined the patient. Patient lying in bed, in no distress. Denies HA, dizziness, palpitations, fever, chills, CP/SOB, N/V/D, dysuria, hematochezia, melena.  Patient febrile with temp 101 F.    Vital Signs Last 24 Hrs  T(C): 38.3 (01 Sep 2018 21:58), Max: 38.3 (01 Sep 2018 21:58)  T(F): 101 (01 Sep 2018 21:58), Max: 101 (01 Sep 2018 21:58)  HR: 133 (01 Sep 2018 21:58) (120 - 154)  BP: 89/58 (01 Sep 2018 21:58) (89/58 - 109/70)  BP(mean): --  RR: 17 (01 Sep 2018 21:45) (16 - 20)  SpO2: 95% (01 Sep 2018 21:45) (94% - 95%)      Labs:                          9.6    12.40 )-----------( 235      ( 01 Sep 2018 10:27 )             28.8     09-01    136  |  104  |  12  ----------------------------<  159<H>  3.7   |  24  |  0.75    Ca    8.1<L>      01 Sep 2018 09:53    TPro  5.4<L>  /  Alb  2.3<L>  /  TBili  3.8<H>  /  DBili  x   /  AST  36  /  ALT  23  /  AlkPhos  110  09-01      Radiology:< from: Xray Chest 1 View- PORTABLE-Routine (08.31.18 @ 21:40) >    The mediastinal cardiac silhouette is unremarkable.    Low lung volumes. Blunting of the bilateral costophrenic angles question   small effusions and/or atelectasis. The lungs are otherwise clear.    MEDICATIONS  (STANDING):  amylase/lipase/protease  (CREON 12,000 Units) 1 Capsule(s) Oral three times a day  lactobacillus acidophilus 1 Tablet(s) Oral three times a day with meals  metoprolol tartrate 25 milliGRAM(s) Oral two times a day  pantoprazole    Tablet 40 milliGRAM(s) Oral before breakfast  piperacillin/tazobactam IVPB. 3.375 Gram(s) IV Intermittent every 8 hours  rivaroxaban 20 milliGRAM(s) Oral every 24 hours  sodium chloride 0.9%. 1000 milliLiter(s) (75 mL/Hr) IV Continuous <Continuous>    MEDICATIONS  (PRN):  acetaminophen   Tablet 650 milliGRAM(s) Oral every 6 hours PRN For Temp greater than 38 C (100.4 F)  metoclopramide Injectable 10 milliGRAM(s) IV Push every 8 hours PRN nausea and vomiting     Past Medical History:  Afib    HTN (hypertension)    Pancreatic cancer  metastatic to liver.     Past Surgical History:  Carpal tunnel syndrome    H/O knee surgery    History of coronary artery stent placement    S/P abdominal paracentesis  aug 30, 2    Review of Systems:   CONSTITUTIONAL: +FEVER, CHILLS, MALAISE  EYES: No eye pain, visual disturbances, or discharge  ENMT:  No difficulty hearing, tinnitus, vertigo; No sinus or throat pain  NECK: No pain or stiffness  BREASTS: No pain, masses, or nipple discharge  RESPIRATORY: No cough, wheezing, chills or hemoptysis; No shortness of breath  CARDIOVASCULAR: No chest pain, palpitations, dizziness, or leg swelling  GASTROINTESTINAL: +POOR PO INTAKE, NAUSEA; No abdominal or epigastric pain. No vomiting, or hematemesis; No diarrhea or constipation. No melena or hematochezia.  GENITOURINARY: No dysuria, frequency, hematuria, or incontinence  NEUROLOGICAL: No headaches, memory loss, loss of strength, numbness, or tremors  SKIN: WARM, flushed  LYMPH NODES: No enlarged glands  ENDOCRINE: No heat or cold intolerance; No hair loss  MUSCULOSKELETAL: No joint pain or swelling; No muscle, back, or extremity pain  PSYCHIATRIC: No depression, anxiety, mood swings, or difficulty sleeping  HEME/LYMPH: No easy bruising, or bleeding gums  ALLERY AND IMMUNOLOGIC: No hives or eczema   all other systems negative or as per HPI    Other Review of Systems: All other review of systems negative, except as noted in HPI       Physical Exam:  General: fatigued, non toxic appearance  Neurology: A&Ox3, nonfocal  Respiratory: CTA B/L  CV: IRRR, S1S2  Abdominal: Soft, NT, ND   MSK: No edema, + peripheral pulses, FROM all 4 extremity    Assessment & Plan:    73M PMH recently diagnosed metastatic pancreatic adenocarcinoma s/p biliary stent (mets to liver, afib on xarelto, HTN presents with fever, generalized weakness, and poor appetite for one day. Admitted for sepsis, unclear source, Afib with RVR , hypotension, fever, s/p Iv fluids resuscitation.  Now patient with fever of 101F, patient had 100.6 F fever earlier, received Tylenol,  still febrile, AFib with RVR on EKG HR in 120's to 140's. Patient otherwise asymptomatic.    Afib with RVR/ tachycardia. manual BP 89/58 mm of hg, febrile, likely tachycardia from fever,  12 lead EKG: Afib with RVR  NS 500ml IV bolus x1  If patient remains tachycardia s/p Iv bolus, consider Metoprolol IV  Continue BB, hold for BP < 100, Hr<60  Continue maintenance iv fluids  cardiology consult in am with house cardiology  Discussed with Attending    Fever/Sepsis: Patient warm, flushed, Tylenol 1000mg IV one dose now  Iv fluid bolus  Continue Zosyn  F/U Bcx results( Sent on 08/31/2018)  F/U with ID in am  Discussed with Attending  Will follow closely  Will update with primary team    Total time spent with the patient: 25 minutes    Jese Carlson [facility] [place]  [id number] Notified by RN that patient is tachycardiac with HR in 120 to n140's,  seen and examined the patient. Patient lying in bed, in no distress. Denies HA, dizziness, palpitations, fever, chills, CP/SOB, N/V/D, dysuria, hematochezia, melena.  Patient febrile with temp 101 F.    Vital Signs Last 24 Hrs  T(C): 38.3 (01 Sep 2018 21:58), Max: 38.3 (01 Sep 2018 21:58)  T(F): 101 (01 Sep 2018 21:58), Max: 101 (01 Sep 2018 21:58)  HR: 133 (01 Sep 2018 21:58) (120 - 154)  BP: 89/58 (01 Sep 2018 21:58) (89/58 - 109/70)  BP(mean): --  RR: 17 (01 Sep 2018 21:45) (16 - 20)  SpO2: 95% (01 Sep 2018 21:45) (94% - 95%)      Labs:                          9.6    12.40 )-----------( 235      ( 01 Sep 2018 10:27 )             28.8     09-01    136  |  104  |  12  ----------------------------<  159<H>  3.7   |  24  |  0.75    Ca    8.1<L>      01 Sep 2018 09:53    TPro  5.4<L>  /  Alb  2.3<L>  /  TBili  3.8<H>  /  DBili  x   /  AST  36  /  ALT  23  /  AlkPhos  110  09-01      Radiology:< from: Xray Chest 1 View- PORTABLE-Routine (08.31.18 @ 21:40) >    The mediastinal cardiac silhouette is unremarkable.    Low lung volumes. Blunting of the bilateral costophrenic angles question   small effusions and/or atelectasis. The lungs are otherwise clear.    MEDICATIONS  (STANDING):  amylase/lipase/protease  (CREON 12,000 Units) 1 Capsule(s) Oral three times a day  lactobacillus acidophilus 1 Tablet(s) Oral three times a day with meals  metoprolol tartrate 25 milliGRAM(s) Oral two times a day  pantoprazole    Tablet 40 milliGRAM(s) Oral before breakfast  piperacillin/tazobactam IVPB. 3.375 Gram(s) IV Intermittent every 8 hours  rivaroxaban 20 milliGRAM(s) Oral every 24 hours  sodium chloride 0.9%. 1000 milliLiter(s) (75 mL/Hr) IV Continuous <Continuous>    MEDICATIONS  (PRN):  acetaminophen   Tablet 650 milliGRAM(s) Oral every 6 hours PRN For Temp greater than 38 C (100.4 F)  metoclopramide Injectable 10 milliGRAM(s) IV Push every 8 hours PRN nausea and vomiting     Past Medical History:  Afib    HTN (hypertension)    Pancreatic cancer  metastatic to liver.     Past Surgical History:  Carpal tunnel syndrome    H/O knee surgery    History of coronary artery stent placement    S/P abdominal paracentesis  aug 30, 2    Review of Systems:   CONSTITUTIONAL: +FEVER, CHILLS, MALAISE  EYES: No eye pain, visual disturbances, or discharge  ENMT:  No difficulty hearing, tinnitus, vertigo; No sinus or throat pain  NECK: No pain or stiffness  BREASTS: No pain, masses, or nipple discharge  RESPIRATORY: No cough, wheezing, chills or hemoptysis; No shortness of breath  CARDIOVASCULAR: No chest pain, palpitations, dizziness, or leg swelling  GASTROINTESTINAL: +POOR PO INTAKE, NAUSEA; No abdominal or epigastric pain. No vomiting, or hematemesis; No diarrhea or constipation. No melena or hematochezia.  GENITOURINARY: No dysuria, frequency, hematuria, or incontinence  NEUROLOGICAL: No headaches, memory loss, loss of strength, numbness, or tremors  SKIN: WARM, flushed  LYMPH NODES: No enlarged glands  ENDOCRINE: No heat or cold intolerance; No hair loss  MUSCULOSKELETAL: No joint pain or swelling; No muscle, back, or extremity pain  PSYCHIATRIC: No depression, anxiety, mood swings, or difficulty sleeping  HEME/LYMPH: No easy bruising, or bleeding gums  ALLERY AND IMMUNOLOGIC: No hives or eczema   all other systems negative or as per HPI    Other Review of Systems: All other review of systems negative, except as noted in HPI       Physical Exam:  General: fatigued, non toxic appearance  Neurology: A&Ox3, nonfocal  Respiratory: CTA B/L  CV: IRRR, S1S2  Abdominal: Soft, NT, ND   MSK: No edema, + peripheral pulses, FROM all 4 extremity    Assessment & Plan:    73M PMH recently diagnosed metastatic pancreatic adenocarcinoma s/p biliary stent (mets to liver, afib on xarelto, HTN presents with fever, generalized weakness, and poor appetite for one day. Admitted for sepsis, unclear source, Afib with RVR , hypotension, fever, s/p Iv fluids resuscitation.  Now patient with fever of 101F, patient had 100.6 F fever earlier, received Tylenol,  still febrile, AFib with RVR on EKG HR in 120's to 140's. Patient otherwise asymptomatic.    Afib with RVR/ tachycardia. manual BP 89/58 mm of hg, febrile, likely tachycardia from fever,  12 lead EKG: Afib with RVR  NS 500ml IV bolus x1for now, IVf bolus to mainatin SBP> 100 mm of hg  If patient remains tachycardia s/p Iv bolus, consider Metoprolol IV  Continue BB, hold for BP < 100, Hr<60  Continue maintenance iv fluids  cardiology consult in am with house cardiology  Discussed with Attending    Fever/Sepsis: Patient warm, flushed, Tylenol 1000mg IV one dose now  Iv fluid bolus  Continue Zosyn  F/U Bcx results( Sent on 08/31/2018)  F/U with ID in am  Discussed with Attending  Will follow closely  Will update with primary team    Total time spent with the patient: 25 minutes    Jese Carlson [facility] [place]  [id number] Notified by RN that patient is tachycardiac with HR in 120 to n140's,  seen and examined the patient. Patient lying in bed, in no distress. Denies HA, dizziness, palpitations, fever, chills, CP/SOB, N/V/D, dysuria, hematochezia, melena.  Patient febrile with temp 101 F.    Vital Signs Last 24 Hrs  T(C): 38.3 (01 Sep 2018 21:58), Max: 38.3 (01 Sep 2018 21:58)  T(F): 101 (01 Sep 2018 21:58), Max: 101 (01 Sep 2018 21:58)  HR: 133 (01 Sep 2018 21:58) (120 - 154)  BP: 89/58 (01 Sep 2018 21:58) (89/58 - 109/70)  BP(mean): --  RR: 17 (01 Sep 2018 21:45) (16 - 20)  SpO2: 95% (01 Sep 2018 21:45) (94% - 95%)      Labs:                          9.6    12.40 )-----------( 235      ( 01 Sep 2018 10:27 )             28.8     09-01    136  |  104  |  12  ----------------------------<  159<H>  3.7   |  24  |  0.75    Ca    8.1<L>      01 Sep 2018 09:53    TPro  5.4<L>  /  Alb  2.3<L>  /  TBili  3.8<H>  /  DBili  x   /  AST  36  /  ALT  23  /  AlkPhos  110  09-01      Radiology:< from: Xray Chest 1 View- PORTABLE-Routine (08.31.18 @ 21:40) >    The mediastinal cardiac silhouette is unremarkable.    Low lung volumes. Blunting of the bilateral costophrenic angles question   small effusions and/or atelectasis. The lungs are otherwise clear.    MEDICATIONS  (STANDING):  amylase/lipase/protease  (CREON 12,000 Units) 1 Capsule(s) Oral three times a day  lactobacillus acidophilus 1 Tablet(s) Oral three times a day with meals  metoprolol tartrate 25 milliGRAM(s) Oral two times a day  pantoprazole    Tablet 40 milliGRAM(s) Oral before breakfast  piperacillin/tazobactam IVPB. 3.375 Gram(s) IV Intermittent every 8 hours  rivaroxaban 20 milliGRAM(s) Oral every 24 hours  sodium chloride 0.9%. 1000 milliLiter(s) (75 mL/Hr) IV Continuous <Continuous>    MEDICATIONS  (PRN):  acetaminophen   Tablet 650 milliGRAM(s) Oral every 6 hours PRN For Temp greater than 38 C (100.4 F)  metoclopramide Injectable 10 milliGRAM(s) IV Push every 8 hours PRN nausea and vomiting     Past Medical History:  Afib    HTN (hypertension)    Pancreatic cancer  metastatic to liver.     Past Surgical History:  Carpal tunnel syndrome    H/O knee surgery    History of coronary artery stent placement    S/P abdominal paracentesis  aug 30, 2    Review of Systems:   CONSTITUTIONAL: +FEVER, CHILLS, MALAISE  EYES: No eye pain, visual disturbances, or discharge  ENMT:  No difficulty hearing, tinnitus, vertigo; No sinus or throat pain  NECK: No pain or stiffness  BREASTS: No pain, masses, or nipple discharge  RESPIRATORY: No cough, wheezing, chills or hemoptysis; No shortness of breath  CARDIOVASCULAR: No chest pain, palpitations, dizziness, or leg swelling  GASTROINTESTINAL: +POOR PO INTAKE, NAUSEA; No abdominal or epigastric pain. No vomiting, or hematemesis; No diarrhea or constipation. No melena or hematochezia.  GENITOURINARY: No dysuria, frequency, hematuria, or incontinence  NEUROLOGICAL: No headaches, memory loss, loss of strength, numbness, or tremors  SKIN: WARM, flushed  LYMPH NODES: No enlarged glands  ENDOCRINE: No heat or cold intolerance; No hair loss  MUSCULOSKELETAL: No joint pain or swelling; No muscle, back, or extremity pain  PSYCHIATRIC: No depression, anxiety, mood swings, or difficulty sleeping  HEME/LYMPH: No easy bruising, or bleeding gums  ALLERY AND IMMUNOLOGIC: No hives or eczema   all other systems negative or as per HPI    Other Review of Systems: All other review of systems negative, except as noted in HPI       Physical Exam:  General: fatigued, non toxic appearance  Neurology: A&Ox3, nonfocal  Respiratory: CTA B/L  CV: IRRR, S1S2  Abdominal: Soft, NT, ND   MSK: No edema, + peripheral pulses, FROM all 4 extremity    Assessment & Plan:    73M PMH recently diagnosed metastatic pancreatic adenocarcinoma s/p biliary stent (mets to liver, afib on xarelto, HTN presents with fever, generalized weakness, and poor appetite for one day. Admitted for sepsis, unclear source, Afib with RVR , hypotension, fever, s/p Iv fluids resuscitation.  Now patient with fever of 101F, patient had 100.6 F fever earlier, received Tylenol,  still febrile, AFib with RVR on EKG HR in 120's to 140's. Patient otherwise asymptomatic.    Afib with RVR/ tachycardia. manual BP 89/58 mm of hg, febrile, likely tachycardia from fever,  12 lead EKG: Afib with RVR  NS 500ml IV bolus x1for now, IVf bolus to mainatin SBP> 100 mm of hg  If patient remains tachycardia s/p Iv bolus, consider Metoprolol IV  Continue BB, hold for BP < 100, Hr<60  Continue maintenance iv fluids  cardiology consult in am with house cardiology  Discussed with Attending    Fever/Sepsis: Patient mentating well, warm, flushed, Tylenol 1000mg IV one dose now  Iv fluid bolus  Continue Zosyn  F/U Bcx results( Sent on 08/31/2018)  F/U with ID in am  Discussed with Attending  Will follow closely  MICU consult if patient condition worsening  Will update with primary team    Total time spent with the patient: 25 minutes    Jese Carlson [facility] [place]  [id number]

## 2018-09-01 NOTE — CONSULT NOTE ADULT - ASSESSMENT
73M PMH recently diagnosed metastatic pancreatic adenocarcinoma s/p biliary stent (mets to liver, started Gemzar/abraxane 8/29/18), afib on xarelto, HTN presents with fever, generalized weakness, and poor appetite for one day.    fever- not neutropenic. check blood cultures, CXR done showing possible effusion? check urine culture  on vanc and zosyn, ID following  check RVP  supportive care    metastatic pancreatic cancer- today is C1D4 OFgemzar/abraxane. follows with Dr. Bauer 73M PMH recently diagnosed metastatic pancreatic adenocarcinoma s/p biliary stent (mets to liver, started Gemzar/abraxane 8/29/18), afib on xarelto, HTN presents with fever, generalized weakness, and poor appetite for one day.    fever- not neutropenic. check blood cultures, CXR done showing possible effusion? check urine culture  on vanc and zosyn, ID following  check RVP  supportive care    metastatic pancreatic cancer- today is C1D4 of gemzar/abraxane. follows with Dr. Bauer

## 2018-09-01 NOTE — CONSULT NOTE ADULT - ASSESSMENT
Impression:  # Elevated Alk Phos and Bilirubin; No clear signs of biliary obstruction at this time. No abdominal pain, nausea, vomiting, RUQ tenderness, etc. Alk Phos and Bilirubin decreased compared to prior to stent placement - suspect residual elevation. Chemotherapeutic agents can also cause elevation in Alk Phos/Bili. CT A/P pending to evaluate liver/biliary tree  # Fevers/Leukocytosis: Unclear etiology at this time; Possible occult infection vs malignancy induced vs chemotherapy related; ID following    Recommendations:  - Follow up CT A/P results  - Continue sepsis work up  - No clear indication for ERCP    Harmony Haider MD  Gastroenterology Fellow  [phone]  Please page on call fellow on weekends and after 5pm on weekdays Impression:  # Elevated Alk Phos and Bilirubin; No clear signs of biliary obstruction at this time. No abdominal pain, nausea, vomiting, RUQ tenderness, etc. Alk Phos and Bilirubin decreased compared to prior to stent placement - suspect residual elevation. Chemotherapeutic agents can also cause elevation in Alk Phos/Bili. CT A/P pending to evaluate liver/biliary tree  # Fevers/Leukocytosis: Unclear etiology at this time; Possible occult infection vs malignancy induced vs chemotherapy related; ID following    Recommendations:  - Follow up CT A/P results  - Continue sepsis work up  - No clear indication for ERCP    Addendum (9/2/18 9:50AM): Patient seen and examined this AM   CT shows patent biliary stent and Liver tests continue to improve. Patient with tmax of 38.3 overnight.   - Would continue sepsis work up - doubt biliary etiology for symptoms    Harmony Haider MD  Gastroenterology Fellow  [phone]  Please page on call fellow on weekends and after 5pm on weekdays

## 2018-09-01 NOTE — CONSULT NOTE ADULT - ASSESSMENT
73M PMH recently diagnosed metastatic pancreatic adenocarcinoma s/p biliary stent (mets to liver, started Gemzar/abraxane 8/29/18), afib on xarelto, HTN presents with fever, generalized weakness, and poor appetite for one day. 73M PMH recently diagnosed metastatic pancreatic adenocarcinoma s/p biliary stent (mets to liver, started Gemzar/abraxane 8/29/18), therapeutic paracentesis 8/31 afib on xarelto, HTN presents with fever to 101, generalized weakness, and poor appetite for one day.   On admission tmax 99.6, tachycardic, borderline BP, leukocytosis to 15 with left shift and lactate 2.2. Elevated LFt's but improving since prior admission. CXr with small left pleural effusion and compressive atelactasis which can cause fever, but he has no pulmonary symptosm  Differentials include cholangitis, atelactasis, fever from chemo    Zosyn and vancomycin   Check vanco trough prior to 4th dose  f/u CT a/p results  f/u blood cx  Check RVP  Incentive spirometry  May consider CT chest if fever persists  Pending discussion 73 M HTN, A-fib on xarelto, recently diagnosed metastatic pancreatic adenocarcinoma with abd carcinomatosis, ascites and liver mets, s/p biliary stent ,started  on chemo (Gemzar/abraxane) 8/29/18, therapeutic paracentesis 8/31 p/w with fever to 101, generalized weakness, and poor appetite for one day after the chemo, no localizing symptoms   On admission tmax 99.6, tachycardic, borderline BP, leukocytosis to 15 with left shift and lactate 2.2. Elevated LFt's but improving since prior admission. CXr with small left effusion and atelectasis    metastatic pancreatic adenocarcinoma with abd carcinomatosis and acites, just started on chemo 8/29 and had a theraputic paracenetesis 8/31 now with fever and generalized weakness no localizing symptoms  fever could be due to chemo, r/o infectious etiology or bacteremia  but LFTs actually better than before and no abd pain      * c/w Zosyn and vancomycin for now  * Check vanco trough prior to 4th dose  * f/u CT a/p results  * f/u blood cx  * Check RVP

## 2018-09-01 NOTE — CONSULT NOTE ADULT - SUBJECTIVE AND OBJECTIVE BOX
HPI:   Patient is a 73y male with recently dxed pancreatic ca (mets to liver), biliary stent, given first chemo earlier this week, followed by large volume paracentesis . (ascites fluid + malig cells).  He notes persistent early satiety, poor appetite, and distention, but no vomiting or diarrhea.  No resp or  Sxs.  Low grade fever developed after paracentesis, (Tmx 101), prompting admission yest.  Tmx 99.6 since arrival, but persistent tachycardia, leukocytosis, prompted Vanco and Zosyn.  No port in place.  No sick contacts.     REVIEW OF SYSTEMS:  All other review of systems negative (Comprehensive ROS)    PAST MEDICAL & SURGICAL HISTORY:  Pancreatic cancer: metastatic to liver  HTN (hypertension)  Afib  History of coronary artery stent placement  Carpal tunnel syndrome  S/P abdominal paracentesis: aug 30, 2018  H/O knee surgery      Allergies  No Known Allergies    Antimicrobials Day # 2   piperacillin/tazobactam IVPB. 3.375 Gram(s) IV Intermittent every 8 hours  vancomycin  IVPB 1000 milliGRAM(s) IV Intermittent every 12 hours    Other Medications:  acetaminophen   Tablet 650 milliGRAM(s) Oral every 6 hours PRN  amylase/lipase/protease  (CREON 12,000 Units) 1 Capsule(s) Oral three times a day  lactobacillus acidophilus 1 Tablet(s) Oral three times a day with meals  metoclopramide Injectable 10 milliGRAM(s) IV Push every 8 hours PRN  metoprolol tartrate 25 milliGRAM(s) Oral two times a day  pantoprazole    Tablet 40 milliGRAM(s) Oral before breakfast  rivaroxaban 20 milliGRAM(s) Oral every 24 hours  sodium chloride 0.9%. 1000 milliLiter(s) IV Continuous <Continuous>      FAMILY HISTORY:  Family history of liver cancer (Mother)    SOCIAL HISTORY:  Smoking: remote  ETOH: occas           T(F): 98.6 (18 @ 11:47), Max: 99.6 (18 @ 19:18)  HR: 123 (18 @ 07:58)  BP: 97/68 (18 @ 07:58)  RR: 17 (18 @ 07:58)  SpO2: 95% (18 @ 07:58)  Wt(kg): --    PHYSICAL EXAM:  General: alert, no acute distress  Eyes:  icteric, no conjunctival injection, no discharge  Oropharynx: no lesions or injection 	  Neck: supple, without adenopathy  Lungs: diminished L base  Heart: irregular rate and rhythm; no murmur, rubs or gallops  Abdomen: soft, distended, nontender, without mass or organomegaly  Skin: no lesions  Extremities: no clubbing, cyanosis, or edema  Neurologic: alert, oriented, moves all extremities    LAB RESULTS:                        9.6    12.40 )-----------( 235      ( 01 Sep 2018 10:27 )             28.8         136  |  104  |  12  ----------------------------<  159<H>  3.7   |  24  |  0.75    Ca    8.1<L>      01 Sep 2018 09:53    TPro  5.4<L>  /  Alb  2.3<L>  /  TBili  3.8<H>  /  DBili  x   /  AST  36  /  ALT  23  /  AlkPhos  110      LIVER FUNCTIONS - ( 01 Sep 2018 09:53 )  Alb: 2.3 g/dL / Pro: 5.4 g/dL / ALK PHOS: 110 U/L / ALT: 23 U/L / AST: 36 U/L / GGT: x           Urinalysis Basic - ( 31 Aug 2018 20:52 )    Color: Brown / Appearance: SL Turbid / S.026 / pH: x  Gluc: x / Ketone: Negative  / Bili: Small / Urobili: >8 mg/dL   Blood: x / Protein: 100 mg/dL / Nitrite: Negative   Leuk Esterase: Negative / RBC: 5-10 /HPF / WBC 5-10 /HPF   Sq Epi: x / Non Sq Epi: x / Bacteria: Few /HPF        MICROBIOLOGY:  RECENT CULTURES:  Pending      RADIOLOGY REVIEWED:  CT Abdomen and Pelvis w/ IV Cont (18 @ 00:02) >  Pancreatic malignancy with peritoneal carcinomatosis and   hepatic metastases.  There is encasement of the celiac artery and hepatic artery.  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  A patent biliary stent is present.

## 2018-09-01 NOTE — CONSULT NOTE ADULT - SUBJECTIVE AND OBJECTIVE BOX
HPI:  73M PMH recently diagnosed metastatic pancreatic adenocarcinoma s/p biliary stent (mets to liver, started Gemzar/abraxane 18), afib on xarelto, HTN presents with fever, generalized weakness, and poor appetite for one day. Patient started chemo two days ago. Had paracentesis performed yesterday with 5500 cc removed. This morning, he felt significant malaise when he woke up. Per his wife, did not eat or drink anything today. Then developed fever up to 101 at home. Patient denies focal complaints besides poor appetite. No chest pain, SOB, abdominal pain, dizziness, abdominal pain, dysuria, hematuria, diarrhea, rashes. No sick contacts. Seemed to tolerate paracentesis yesterday, with mild improvement in abdominal bloating. (31 Aug 2018 22:36)      PAST MEDICAL & SURGICAL HISTORY:  Pancreatic cancer: metastatic to liver  HTN (hypertension)  Afib  History of coronary artery stent placement  Carpal tunnel syndrome  S/P abdominal paracentesis: aug 30, 2018  H/O knee surgery      Allergies    No Known Allergies    Intolerances        MEDICATIONS  (STANDING):  amylase/lipase/protease  (CREON 12,000 Units) 1 Capsule(s) Oral three times a day  lactobacillus acidophilus 1 Tablet(s) Oral three times a day with meals  metoprolol tartrate 25 milliGRAM(s) Oral two times a day  pantoprazole    Tablet 40 milliGRAM(s) Oral before breakfast  piperacillin/tazobactam IVPB. 3.375 Gram(s) IV Intermittent every 8 hours  rivaroxaban 20 milliGRAM(s) Oral every 24 hours  sodium chloride 0.9%. 1000 milliLiter(s) (75 mL/Hr) IV Continuous <Continuous>  vancomycin  IVPB 1000 milliGRAM(s) IV Intermittent every 12 hours    MEDICATIONS  (PRN):  acetaminophen   Tablet 650 milliGRAM(s) Oral every 6 hours PRN For Temp greater than 38 C (100.4 F)  metoclopramide Injectable 10 milliGRAM(s) IV Push every 8 hours PRN nausea and vomiting      FAMILY HISTORY:  Family history of liver cancer (Mother): In mother,  in her 50&#x27;s      SOCIAL HISTORY: No EtOH, no tobacco    REVIEW OF SYSTEMS:    CONSTITUTIONAL: No weakness, fevers or chills  EYES/ENT: No visual changes;  No vertigo or throat pain   NECK: No pain or stiffness  RESPIRATORY: No cough, wheezing, hemoptysis; No shortness of breath  CARDIOVASCULAR: No chest pain or palpitations  GASTROINTESTINAL: No abdominal or epigastric pain. No nausea, vomiting, or hematemesis; No diarrhea or constipation. No melena or hematochezia.  GENITOURINARY: No dysuria, frequency or hematuria  NEUROLOGICAL: No numbness or weakness  SKIN: No itching, burning, rashes, or lesions   All other review of systems is negative unless indicated above.    Height (cm): 172.72 ( @ :18)  Weight (kg): 86.2 ( @ :18)  BMI (kg/m2): 28.9 ( @ :18)  BSA (m2): 2 ( @ :18)    T(F): 99.1 (18 @ 02:53), Max: 99.6 (18 @ 19:18)  HR: 123 (18 @ 07:58)  BP: 97/68 (18 @ 07:58)  RR: 17 (18 @ 07:58)  SpO2: 95% (18 @ 07:58)  Wt(kg): --    GENERAL: NAD, well-developed  HEAD:  Atraumatic, Normocephalic  EYES: EOMI, PERRLA, conjunctiva and sclera clear  NECK: Supple, No JVD  CHEST/LUNG: Clear to auscultation bilaterally; No wheeze  HEART: Regular rate and rhythm; No murmurs, rubs, or gallops  ABDOMEN: Soft, Nontender, Nondistended; Bowel sounds present  EXTREMITIES:  2+ Peripheral Pulses, No clubbing, cyanosis, or edema  NEUROLOGY: non-focal  SKIN: No rashes or lesions                          9.6    12.40 )-----------( 235      ( 01 Sep 2018 10:27 )             28.8           136  |  104  |  12  ----------------------------<  159<H>  3.7   |  24  |  0.75    Ca    8.1<L>      01 Sep 2018 09:53    TPro  5.4<L>  /  Alb  2.3<L>  /  TBili  3.8<H>  /  DBili  x   /  AST  36  /  ALT  23  /  AlkPhos  110  09-          PT/INR - ( 01 Sep 2018 10:27 )   PT: 15.8 sec;   INR: 1.39 ratio         PTT - ( 01 Sep 2018 10:27 )  PTT:34.7 sec

## 2018-09-02 LAB
ANION GAP SERPL CALC-SCNC: 10 MMOL/L — SIGNIFICANT CHANGE UP (ref 5–17)
ANION GAP SERPL CALC-SCNC: 7 MMOL/L — SIGNIFICANT CHANGE UP (ref 5–17)
BASOPHILS # BLD AUTO: 0 K/UL — SIGNIFICANT CHANGE UP (ref 0–0.2)
BASOPHILS NFR BLD AUTO: 0.1 % — SIGNIFICANT CHANGE UP (ref 0–2)
BUN SERPL-MCNC: 11 MG/DL — SIGNIFICANT CHANGE UP (ref 7–23)
BUN SERPL-MCNC: 11 MG/DL — SIGNIFICANT CHANGE UP (ref 7–23)
CALCIUM SERPL-MCNC: 7.6 MG/DL — LOW (ref 8.4–10.5)
CALCIUM SERPL-MCNC: 7.7 MG/DL — LOW (ref 8.4–10.5)
CHLORIDE SERPL-SCNC: 103 MMOL/L — SIGNIFICANT CHANGE UP (ref 96–108)
CHLORIDE SERPL-SCNC: 107 MMOL/L — SIGNIFICANT CHANGE UP (ref 96–108)
CO2 SERPL-SCNC: 21 MMOL/L — LOW (ref 22–31)
CO2 SERPL-SCNC: 22 MMOL/L — SIGNIFICANT CHANGE UP (ref 22–31)
CREAT SERPL-MCNC: 0.71 MG/DL — SIGNIFICANT CHANGE UP (ref 0.5–1.3)
CREAT SERPL-MCNC: 0.74 MG/DL — SIGNIFICANT CHANGE UP (ref 0.5–1.3)
EOSINOPHIL # BLD AUTO: 0.1 K/UL — SIGNIFICANT CHANGE UP (ref 0–0.5)
EOSINOPHIL NFR BLD AUTO: 1.4 % — SIGNIFICANT CHANGE UP (ref 0–6)
GLUCOSE SERPL-MCNC: 130 MG/DL — HIGH (ref 70–99)
GLUCOSE SERPL-MCNC: 145 MG/DL — HIGH (ref 70–99)
HCT VFR BLD CALC: 27.9 % — LOW (ref 39–50)
HCT VFR BLD CALC: 29.4 % — LOW (ref 39–50)
HGB BLD-MCNC: 9.6 G/DL — LOW (ref 13–17)
HGB BLD-MCNC: 9.6 G/DL — LOW (ref 13–17)
LACTATE SERPL-SCNC: 1.1 MMOL/L — SIGNIFICANT CHANGE UP (ref 0.7–2)
LYMPHOCYTES # BLD AUTO: 0.5 K/UL — LOW (ref 1–3.3)
LYMPHOCYTES # BLD AUTO: 5.3 % — LOW (ref 13–44)
MAGNESIUM SERPL-MCNC: 1.7 MG/DL — SIGNIFICANT CHANGE UP (ref 1.6–2.6)
MCHC RBC-ENTMCNC: 32.7 GM/DL — SIGNIFICANT CHANGE UP (ref 32–36)
MCHC RBC-ENTMCNC: 32.9 PG — SIGNIFICANT CHANGE UP (ref 27–34)
MCHC RBC-ENTMCNC: 33.6 PG — SIGNIFICANT CHANGE UP (ref 27–34)
MCHC RBC-ENTMCNC: 34.4 GM/DL — SIGNIFICANT CHANGE UP (ref 32–36)
MCV RBC AUTO: 101 FL — HIGH (ref 80–100)
MCV RBC AUTO: 97.6 FL — SIGNIFICANT CHANGE UP (ref 80–100)
MONOCYTES # BLD AUTO: 0.1 K/UL — SIGNIFICANT CHANGE UP (ref 0–0.9)
MONOCYTES NFR BLD AUTO: 0.6 % — LOW (ref 2–14)
NEUTROPHILS # BLD AUTO: 8.4 K/UL — HIGH (ref 1.8–7.4)
NEUTROPHILS NFR BLD AUTO: 92.6 % — HIGH (ref 43–77)
PHOSPHATE SERPL-MCNC: 2.7 MG/DL — SIGNIFICANT CHANGE UP (ref 2.5–4.5)
PLATELET # BLD AUTO: 214 K/UL — SIGNIFICANT CHANGE UP (ref 150–400)
PLATELET # BLD AUTO: 217 K/UL — SIGNIFICANT CHANGE UP (ref 150–400)
POTASSIUM SERPL-MCNC: 3.7 MMOL/L — SIGNIFICANT CHANGE UP (ref 3.5–5.3)
POTASSIUM SERPL-MCNC: 3.8 MMOL/L — SIGNIFICANT CHANGE UP (ref 3.5–5.3)
POTASSIUM SERPL-SCNC: 3.7 MMOL/L — SIGNIFICANT CHANGE UP (ref 3.5–5.3)
POTASSIUM SERPL-SCNC: 3.8 MMOL/L — SIGNIFICANT CHANGE UP (ref 3.5–5.3)
RBC # BLD: 2.86 M/UL — LOW (ref 4.2–5.8)
RBC # BLD: 2.92 M/UL — LOW (ref 4.2–5.8)
RBC # FLD: 13.4 % — SIGNIFICANT CHANGE UP (ref 10.3–14.5)
RBC # FLD: 15.3 % — HIGH (ref 10.3–14.5)
SODIUM SERPL-SCNC: 132 MMOL/L — LOW (ref 135–145)
SODIUM SERPL-SCNC: 138 MMOL/L — SIGNIFICANT CHANGE UP (ref 135–145)
WBC # BLD: 7.63 K/UL — SIGNIFICANT CHANGE UP (ref 3.8–10.5)
WBC # BLD: 9.1 K/UL — SIGNIFICANT CHANGE UP (ref 3.8–10.5)
WBC # FLD AUTO: 7.63 K/UL — SIGNIFICANT CHANGE UP (ref 3.8–10.5)
WBC # FLD AUTO: 9.1 K/UL — SIGNIFICANT CHANGE UP (ref 3.8–10.5)

## 2018-09-02 PROCEDURE — 71045 X-RAY EXAM CHEST 1 VIEW: CPT | Mod: 26

## 2018-09-02 PROCEDURE — 99233 SBSQ HOSP IP/OBS HIGH 50: CPT

## 2018-09-02 PROCEDURE — 99232 SBSQ HOSP IP/OBS MODERATE 35: CPT

## 2018-09-02 PROCEDURE — 99222 1ST HOSP IP/OBS MODERATE 55: CPT | Mod: GC

## 2018-09-02 RX ORDER — SODIUM CHLORIDE 9 MG/ML
500 INJECTION INTRAMUSCULAR; INTRAVENOUS; SUBCUTANEOUS ONCE
Qty: 0 | Refills: 0 | Status: COMPLETED | OUTPATIENT
Start: 2018-09-02 | End: 2018-09-02

## 2018-09-02 RX ORDER — CALCIUM GLUCONATE 100 MG/ML
1 VIAL (ML) INTRAVENOUS ONCE
Qty: 0 | Refills: 0 | Status: COMPLETED | OUTPATIENT
Start: 2018-09-02 | End: 2018-09-02

## 2018-09-02 RX ORDER — SODIUM CHLORIDE 9 MG/ML
1000 INJECTION INTRAMUSCULAR; INTRAVENOUS; SUBCUTANEOUS ONCE
Qty: 0 | Refills: 0 | Status: COMPLETED | OUTPATIENT
Start: 2018-09-02 | End: 2018-09-02

## 2018-09-02 RX ORDER — DOCUSATE SODIUM 100 MG
100 CAPSULE ORAL
Qty: 0 | Refills: 0 | Status: DISCONTINUED | OUTPATIENT
Start: 2018-09-02 | End: 2018-09-04

## 2018-09-02 RX ORDER — DIGOXIN 250 MCG
0.5 TABLET ORAL ONCE
Qty: 0 | Refills: 0 | Status: COMPLETED | OUTPATIENT
Start: 2018-09-02 | End: 2018-09-02

## 2018-09-02 RX ORDER — SENNA PLUS 8.6 MG/1
2 TABLET ORAL AT BEDTIME
Qty: 0 | Refills: 0 | Status: DISCONTINUED | OUTPATIENT
Start: 2018-09-02 | End: 2018-09-04

## 2018-09-02 RX ORDER — CARVEDILOL PHOSPHATE 80 MG/1
6.25 CAPSULE, EXTENDED RELEASE ORAL EVERY 12 HOURS
Qty: 0 | Refills: 0 | Status: DISCONTINUED | OUTPATIENT
Start: 2018-09-02 | End: 2018-09-04

## 2018-09-02 RX ADMIN — Medication 1 TABLET(S): at 18:39

## 2018-09-02 RX ADMIN — SODIUM CHLORIDE 500 MILLILITER(S): 9 INJECTION INTRAMUSCULAR; INTRAVENOUS; SUBCUTANEOUS at 00:15

## 2018-09-02 RX ADMIN — Medication 25 MILLIGRAM(S): at 06:06

## 2018-09-02 RX ADMIN — Medication 1 CAPSULE(S): at 06:06

## 2018-09-02 RX ADMIN — Medication 1 CAPSULE(S): at 14:05

## 2018-09-02 RX ADMIN — PIPERACILLIN AND TAZOBACTAM 25 GRAM(S): 4; .5 INJECTION, POWDER, LYOPHILIZED, FOR SOLUTION INTRAVENOUS at 06:06

## 2018-09-02 RX ADMIN — Medication 1 TABLET(S): at 08:12

## 2018-09-02 RX ADMIN — Medication 1 CAPSULE(S): at 21:15

## 2018-09-02 RX ADMIN — CARVEDILOL PHOSPHATE 6.25 MILLIGRAM(S): 80 CAPSULE, EXTENDED RELEASE ORAL at 18:39

## 2018-09-02 RX ADMIN — SODIUM CHLORIDE 75 MILLILITER(S): 9 INJECTION INTRAMUSCULAR; INTRAVENOUS; SUBCUTANEOUS at 11:07

## 2018-09-02 RX ADMIN — PANTOPRAZOLE SODIUM 40 MILLIGRAM(S): 20 TABLET, DELAYED RELEASE ORAL at 06:06

## 2018-09-02 RX ADMIN — RIVAROXABAN 20 MILLIGRAM(S): KIT at 18:39

## 2018-09-02 RX ADMIN — PIPERACILLIN AND TAZOBACTAM 25 GRAM(S): 4; .5 INJECTION, POWDER, LYOPHILIZED, FOR SOLUTION INTRAVENOUS at 21:15

## 2018-09-02 RX ADMIN — Medication 1 TABLET(S): at 11:50

## 2018-09-02 RX ADMIN — Medication 200 GRAM(S): at 08:55

## 2018-09-02 RX ADMIN — SODIUM CHLORIDE 1000 MILLILITER(S): 9 INJECTION INTRAMUSCULAR; INTRAVENOUS; SUBCUTANEOUS at 02:31

## 2018-09-02 RX ADMIN — PIPERACILLIN AND TAZOBACTAM 25 GRAM(S): 4; .5 INJECTION, POWDER, LYOPHILIZED, FOR SOLUTION INTRAVENOUS at 14:05

## 2018-09-02 RX ADMIN — Medication 0.5 MILLIGRAM(S): at 02:23

## 2018-09-02 NOTE — CONSULT NOTE ADULT - SUBJECTIVE AND OBJECTIVE BOX
Initial Cardiology Attending Consult    CHIEF COMPLAINT: fever      HISTORY OF PRESENT ILLNESS:  ROYCE NORMAN is a 73y Male patient PMH metastatic pancreatic adenocarcinoma s/p biliary stent (mets to liver, started Gemzar/abraxane 18), afib on xarelto, HTN presents with fever, generalized weakness, and poor appetite for one day .  He started chemo two days ago. Had paracentesis performed yesterday with 5500 cc removed.   Saturday morning, he felt significant malaise when he woke up. + anorexia.  Then developed fever up to 101 at home. Patient denies focal complaints besides poor appetite. No chest pain, SOB, abdominal pain, dizziness, abdominal pain, dysuria, hematuria, diarrhea, rashes. No sick contacts.   Allergies    No Known Allergies    Intolerances    	    PAST MEDICAL & SURGICAL HISTORY:  Pancreatic cancer: metastatic to liver  HTN (hypertension)  Afib  History of coronary artery stent placement  Carpal tunnel syndrome  S/P abdominal paracentesis: aug 30, 2018  H/O knee surgery      FAMILY HISTORY:  Family history of liver cancer (Mother): In mother,  in her 50&#x27;s      SOCIAL HISTORY:    [x ] Non-smoker  [ ] Smoker  [ ] Alcohol      REVIEW OF SYSTEMS:  CONSTITUTIONAL: +fever, weight loss, +fatigue  EYES: No eye pain, visual disturbances, or discharge  ENMT:  No difficulty hearing, tinnitus, vertigo; No sinus or throat pain  NECK: No pain or stiffness  RESPIRATORY: No cough, wheezing, chills or hemoptysis; No Shortness of Breath  CARDIOVASCULAR: No chest pain, palpitations, passing out, dizziness, or leg swelling  GASTROINTESTINAL: No abdominal or epigastric pain. No nausea, vomiting, or hematemesis; No diarrhea or constipation. No melena or hematochezia.  GENITOURINARY: No dysuria, frequency, hematuria, or incontinence  NEUROLOGICAL: No headaches, memory loss, loss of strength, numbness, or tremors  SKIN: No itching, burning, rashes, or lesions   LYMPH Nodes: No enlarged glands  ENDOCRINE: No heat or cold intolerance; No hair loss  MUSCULOSKELETAL: No joint pain or swelling; No muscle, back, or extremity pain  PSYCHIATRIC: No depression, anxiety, mood swings, or difficulty sleeping  HEME/LYMPH: No easy bruising, or bleeding gums  ALLERY AND IMMUNOLOGIC: No hives or eczema	    [ ] All others negative	  [ ] Unable to obtain    PHYSICAL EXAM:  I&O's Summary    01 Sep 2018 07:01  -  02 Sep 2018 05:38  --------------------------------------------------------  IN: 600 mL / OUT: 300 mL / NET: 300 mL    Vital Signs Last 24 Hrs  T(C): 36.9 (02 Sep 2018 01:10), Max: 38.3 (01 Sep 2018 21:58)  T(F): 98.4 (02 Sep 2018 01:10), Max: 101 (01 Sep 2018 21:58)  HR: 110 (02 Sep 2018 04:00) (110 - 154)  BP: 104/68 (02 Sep 2018 04:00) (89/58 - 109/70)  BP(mean): --  RR: 18 (02 Sep 2018 01:10) (16 - 18)  SpO2: 95% (02 Sep 2018 01:10) (95% - 95%)    Appearance: Normal	  HEENT:   Normal oral mucosa, PERRL, EOMI	  Lymphatic: No lymphadenopathy  Cardiovascular: irreg, tachycardic,, No JVD, No murmurs, No edema  Respiratory: Lungs clear to auscultation	  Psychiatry: A & O x 3, Mood & affect appropriate  Gastrointestinal:   Skin: No rashes, No ecchymoses, No cyanosis	  Neurologic: Non-focal  Extremities: Normal range of motion, No clubbing, cyanosis or edema  Vascular: Peripheral pulses palpable 2+ bilaterally    MEDICATIONS:  Home Medications:  carvedilol 6.25 mg oral tablet: 1 tab(s) orally 2 times a day (31 Aug 2018 23:09)  Creon 12,000 units oral delayed release capsule: 1-2 cap(s) orally 3 times a day before meals (31 Aug 2018 23:09)  Reglan 5 mg oral tablet: 1 tab(s) orally 4 times a day (before meals and at bedtime), As Needed (31 Aug 2018 23:09)  Xarelto 20 mg oral tablet: 1 tab(s) orally once a day (31 Aug 2018 23:09)    MEDICATIONS  (STANDING):  amylase/lipase/protease  (CREON 12,000 Units) 1 Capsule(s) Oral three times a day  lactobacillus acidophilus 1 Tablet(s) Oral three times a day with meals  metoprolol tartrate 25 milliGRAM(s) Oral two times a day  pantoprazole    Tablet 40 milliGRAM(s) Oral before breakfast  piperacillin/tazobactam IVPB. 3.375 Gram(s) IV Intermittent every 8 hours  rivaroxaban 20 milliGRAM(s) Oral every 24 hours  sodium chloride 0.9%. 1000 milliLiter(s) (75 mL/Hr) IV Continuous <Continuous>    MEDICATIONS  (PRN):  acetaminophen   Tablet 650 milliGRAM(s) Oral every 6 hours PRN For Temp greater than 38 C (100.4 F)  metoclopramide Injectable 10 milliGRAM(s) IV Push every 8 hours PRN nausea and vomiting      LABS:	 	    CBC Full  -  ( 02 Sep 2018 02:46 )  WBC Count : 9.1 K/uL  Hemoglobin : 9.6 g/dL  Hematocrit : 29.4 %  Platelet Count - Automated : 214 K/uL  Mean Cell Volume : 101.0 fl  Mean Cell Hemoglobin : 32.9 pg  Mean Cell Hemoglobin Concentration : 32.7 gm/dL  Auto Neutrophil # : 8.4 K/uL  Auto Lymphocyte # : 0.5 K/uL  Auto Monocyte # : 0.1 K/uL  Auto Eosinophil # : 0.1 K/uL  Auto Basophil # : 0.0 K/uL  Auto Neutrophil % : 92.6 %  Auto Lymphocyte % : 5.3 %  Auto Monocyte % : 0.6 %  Auto Eosinophil % : 1.4 %  Auto Basophil % : 0.1 %        132<L>  |  103  |  11  ----------------------------<  145<H>  3.7   |  22  |  0.74      136  |  104  |  12  ----------------------------<  159<H>  3.7   |  24  |  0.75    Ca    7.7<L>      02 Sep 2018 02:46  Ca    8.1<L>      01 Sep 2018 09:53  Phos  2.7       Mg     1.7         TPro  5.4<L>  /  Alb  2.3<L>  /  TBili  3.8<H>  /  DBili  x   /  AST  36  /  ALT  23  /  AlkPhos  110    TPro  5.8<L>  /  Alb  2.6<L>  /  TBili  5.3<H>  /  DBili  x   /  AST  35  /  ALT  24  /  AlkPhos  130<H>        proBNP:   Lipid Profile:   HgA1c:   TSH:     TROPONIN:        TELEMETRY: Afib 150s trending down to 110	    ECG:  	Afib RVR  RADIOLOGY:  OTHER: 	    CARDIAC TESTING/STUDIES:    [ ] Echocardiogram:  [ ]  Catheterization:  [ ] Stress Test:  	  	  ASSESSMENT/PLAN: 	  ROYCE NORMAN is a 73y Male patient PMH metastatic pancreatic adenocarcinoma s/p biliary stent (mets to liver, started Gemzar/abraxane 18), afib on xarelto, HTN presents with fever, generalized weakness, and poor appetite for one day .    upon presentation he had fever and Afib RVR.  As his fever has improved his HR has trended down with IVF and metoprolol, continue metoprolol for HR control, uptitrate metoprolol as needed for optimal HR control  monitor on tele  continue to treat underlying fever        Law Diamond MD, PhD  Cardiology Attending  [facility]  [phone]    (Cardiology Nocturnist cell number available 7 pm - 7 am every night; available daytime week days for follow-up only; daytime weekends covered by general cardiology consult service)

## 2018-09-03 LAB
ANION GAP SERPL CALC-SCNC: 11 MMOL/L — SIGNIFICANT CHANGE UP (ref 5–17)
BUN SERPL-MCNC: 11 MG/DL — SIGNIFICANT CHANGE UP (ref 7–23)
CALCIUM SERPL-MCNC: 8.1 MG/DL — LOW (ref 8.4–10.5)
CHLORIDE SERPL-SCNC: 104 MMOL/L — SIGNIFICANT CHANGE UP (ref 96–108)
CO2 SERPL-SCNC: 21 MMOL/L — LOW (ref 22–31)
CREAT SERPL-MCNC: 0.72 MG/DL — SIGNIFICANT CHANGE UP (ref 0.5–1.3)
GLUCOSE SERPL-MCNC: 123 MG/DL — HIGH (ref 70–99)
HCT VFR BLD CALC: 30 % — LOW (ref 39–50)
HGB BLD-MCNC: 10.1 G/DL — LOW (ref 13–17)
MAGNESIUM SERPL-MCNC: 1.8 MG/DL — SIGNIFICANT CHANGE UP (ref 1.6–2.6)
MCHC RBC-ENTMCNC: 32.8 PG — SIGNIFICANT CHANGE UP (ref 27–34)
MCHC RBC-ENTMCNC: 33.7 GM/DL — SIGNIFICANT CHANGE UP (ref 32–36)
MCV RBC AUTO: 97.4 FL — SIGNIFICANT CHANGE UP (ref 80–100)
PHOSPHATE SERPL-MCNC: 2.9 MG/DL — SIGNIFICANT CHANGE UP (ref 2.5–4.5)
PLATELET # BLD AUTO: 196 K/UL — SIGNIFICANT CHANGE UP (ref 150–400)
POTASSIUM SERPL-MCNC: 3.8 MMOL/L — SIGNIFICANT CHANGE UP (ref 3.5–5.3)
POTASSIUM SERPL-SCNC: 3.8 MMOL/L — SIGNIFICANT CHANGE UP (ref 3.5–5.3)
RBC # BLD: 3.08 M/UL — LOW (ref 4.2–5.8)
RBC # FLD: 15 % — HIGH (ref 10.3–14.5)
SODIUM SERPL-SCNC: 136 MMOL/L — SIGNIFICANT CHANGE UP (ref 135–145)
WBC # BLD: 4.33 K/UL — SIGNIFICANT CHANGE UP (ref 3.8–10.5)
WBC # FLD AUTO: 4.33 K/UL — SIGNIFICANT CHANGE UP (ref 3.8–10.5)

## 2018-09-03 PROCEDURE — 99232 SBSQ HOSP IP/OBS MODERATE 35: CPT

## 2018-09-03 RX ADMIN — RIVAROXABAN 20 MILLIGRAM(S): KIT at 18:47

## 2018-09-03 RX ADMIN — Medication 100 MILLIGRAM(S): at 06:24

## 2018-09-03 RX ADMIN — Medication 1 CAPSULE(S): at 06:24

## 2018-09-03 RX ADMIN — Medication 1 TABLET(S): at 08:33

## 2018-09-03 RX ADMIN — Medication 100 MILLIGRAM(S): at 18:48

## 2018-09-03 RX ADMIN — Medication 1 TABLET(S): at 18:47

## 2018-09-03 RX ADMIN — Medication 1 CAPSULE(S): at 21:36

## 2018-09-03 RX ADMIN — PIPERACILLIN AND TAZOBACTAM 25 GRAM(S): 4; .5 INJECTION, POWDER, LYOPHILIZED, FOR SOLUTION INTRAVENOUS at 06:24

## 2018-09-03 RX ADMIN — PANTOPRAZOLE SODIUM 40 MILLIGRAM(S): 20 TABLET, DELAYED RELEASE ORAL at 06:24

## 2018-09-03 RX ADMIN — CARVEDILOL PHOSPHATE 6.25 MILLIGRAM(S): 80 CAPSULE, EXTENDED RELEASE ORAL at 18:48

## 2018-09-03 RX ADMIN — CARVEDILOL PHOSPHATE 6.25 MILLIGRAM(S): 80 CAPSULE, EXTENDED RELEASE ORAL at 06:24

## 2018-09-03 RX ADMIN — Medication 1 CAPSULE(S): at 13:45

## 2018-09-03 RX ADMIN — Medication 1 TABLET(S): at 12:39

## 2018-09-04 ENCOUNTER — TRANSCRIPTION ENCOUNTER (OUTPATIENT)
Age: 73
End: 2018-09-04

## 2018-09-04 VITALS
SYSTOLIC BLOOD PRESSURE: 99 MMHG | HEART RATE: 109 BPM | DIASTOLIC BLOOD PRESSURE: 62 MMHG | OXYGEN SATURATION: 94 % | RESPIRATION RATE: 17 BRPM | TEMPERATURE: 98 F

## 2018-09-04 LAB
ANION GAP SERPL CALC-SCNC: 9 MMOL/L — SIGNIFICANT CHANGE UP (ref 5–17)
BASOPHILS # BLD AUTO: 0 K/UL — SIGNIFICANT CHANGE UP (ref 0–0.2)
BASOPHILS NFR BLD AUTO: 0 % — SIGNIFICANT CHANGE UP (ref 0–2)
BUN SERPL-MCNC: 12 MG/DL — SIGNIFICANT CHANGE UP (ref 7–23)
CALCIUM SERPL-MCNC: 7.6 MG/DL — LOW (ref 8.4–10.5)
CHLORIDE SERPL-SCNC: 107 MMOL/L — SIGNIFICANT CHANGE UP (ref 96–108)
CO2 SERPL-SCNC: 21 MMOL/L — LOW (ref 22–31)
CREAT SERPL-MCNC: 0.69 MG/DL — SIGNIFICANT CHANGE UP (ref 0.5–1.3)
EOSINOPHIL # BLD AUTO: 0.03 K/UL — SIGNIFICANT CHANGE UP (ref 0–0.5)
EOSINOPHIL NFR BLD AUTO: 0.6 % — SIGNIFICANT CHANGE UP (ref 0–6)
GLUCOSE SERPL-MCNC: 120 MG/DL — HIGH (ref 70–99)
HCT VFR BLD CALC: 28.8 % — LOW (ref 39–50)
HGB BLD-MCNC: 9.7 G/DL — LOW (ref 13–17)
IMM GRANULOCYTES NFR BLD AUTO: 0.4 % — SIGNIFICANT CHANGE UP (ref 0–1.5)
LYMPHOCYTES # BLD AUTO: 0.46 K/UL — LOW (ref 1–3.3)
LYMPHOCYTES # BLD AUTO: 8.5 % — LOW (ref 13–44)
MCHC RBC-ENTMCNC: 33.1 PG — SIGNIFICANT CHANGE UP (ref 27–34)
MCHC RBC-ENTMCNC: 33.7 GM/DL — SIGNIFICANT CHANGE UP (ref 32–36)
MCV RBC AUTO: 98.3 FL — SIGNIFICANT CHANGE UP (ref 80–100)
MONOCYTES # BLD AUTO: 0.12 K/UL — SIGNIFICANT CHANGE UP (ref 0–0.9)
MONOCYTES NFR BLD AUTO: 2.2 % — SIGNIFICANT CHANGE UP (ref 2–14)
NEUTROPHILS # BLD AUTO: 4.79 K/UL — SIGNIFICANT CHANGE UP (ref 1.8–7.4)
NEUTROPHILS NFR BLD AUTO: 88.3 % — HIGH (ref 43–77)
PLATELET # BLD AUTO: 192 K/UL — SIGNIFICANT CHANGE UP (ref 150–400)
POTASSIUM SERPL-MCNC: 3.4 MMOL/L — LOW (ref 3.5–5.3)
POTASSIUM SERPL-SCNC: 3.4 MMOL/L — LOW (ref 3.5–5.3)
RBC # BLD: 2.93 M/UL — LOW (ref 4.2–5.8)
RBC # FLD: 14.5 % — SIGNIFICANT CHANGE UP (ref 10.3–14.5)
SODIUM SERPL-SCNC: 137 MMOL/L — SIGNIFICANT CHANGE UP (ref 135–145)
WBC # BLD: 5.42 K/UL — SIGNIFICANT CHANGE UP (ref 3.8–10.5)
WBC # FLD AUTO: 5.42 K/UL — SIGNIFICANT CHANGE UP (ref 3.8–10.5)

## 2018-09-04 PROCEDURE — 83605 ASSAY OF LACTIC ACID: CPT

## 2018-09-04 PROCEDURE — 99285 EMERGENCY DEPT VISIT HI MDM: CPT | Mod: 25

## 2018-09-04 PROCEDURE — 82435 ASSAY OF BLOOD CHLORIDE: CPT

## 2018-09-04 PROCEDURE — 88305 TISSUE EXAM BY PATHOLOGIST: CPT

## 2018-09-04 PROCEDURE — 81001 URINALYSIS AUTO W/SCOPE: CPT

## 2018-09-04 PROCEDURE — 82947 ASSAY GLUCOSE BLOOD QUANT: CPT

## 2018-09-04 PROCEDURE — 83735 ASSAY OF MAGNESIUM: CPT

## 2018-09-04 PROCEDURE — 99232 SBSQ HOSP IP/OBS MODERATE 35: CPT

## 2018-09-04 PROCEDURE — 87040 BLOOD CULTURE FOR BACTERIA: CPT

## 2018-09-04 PROCEDURE — 74177 CT ABD & PELVIS W/CONTRAST: CPT

## 2018-09-04 PROCEDURE — 85027 COMPLETE CBC AUTOMATED: CPT

## 2018-09-04 PROCEDURE — 96374 THER/PROPH/DIAG INJ IV PUSH: CPT

## 2018-09-04 PROCEDURE — 85014 HEMATOCRIT: CPT

## 2018-09-04 PROCEDURE — 84100 ASSAY OF PHOSPHORUS: CPT

## 2018-09-04 PROCEDURE — 49083 ABD PARACENTESIS W/IMAGING: CPT

## 2018-09-04 PROCEDURE — 88112 CYTOPATH CELL ENHANCE TECH: CPT

## 2018-09-04 PROCEDURE — 87086 URINE CULTURE/COLONY COUNT: CPT

## 2018-09-04 PROCEDURE — 82803 BLOOD GASES ANY COMBINATION: CPT

## 2018-09-04 PROCEDURE — 84295 ASSAY OF SERUM SODIUM: CPT

## 2018-09-04 PROCEDURE — 84132 ASSAY OF SERUM POTASSIUM: CPT

## 2018-09-04 PROCEDURE — 82330 ASSAY OF CALCIUM: CPT

## 2018-09-04 PROCEDURE — 85730 THROMBOPLASTIN TIME PARTIAL: CPT

## 2018-09-04 PROCEDURE — 80053 COMPREHEN METABOLIC PANEL: CPT

## 2018-09-04 PROCEDURE — 85610 PROTHROMBIN TIME: CPT

## 2018-09-04 PROCEDURE — 80048 BASIC METABOLIC PNL TOTAL CA: CPT

## 2018-09-04 PROCEDURE — 71045 X-RAY EXAM CHEST 1 VIEW: CPT

## 2018-09-04 PROCEDURE — 93005 ELECTROCARDIOGRAM TRACING: CPT

## 2018-09-04 RX ORDER — CARVEDILOL PHOSPHATE 80 MG/1
1 CAPSULE, EXTENDED RELEASE ORAL
Qty: 0 | Refills: 0 | COMMUNITY

## 2018-09-04 RX ORDER — SENNA PLUS 8.6 MG/1
2 TABLET ORAL
Qty: 60 | Refills: 0 | OUTPATIENT
Start: 2018-09-04 | End: 2018-10-03

## 2018-09-04 RX ORDER — LIPASE/PROTEASE/AMYLASE 16-48-48K
1 CAPSULE,DELAYED RELEASE (ENTERIC COATED) ORAL
Qty: 90 | Refills: 0 | OUTPATIENT
Start: 2018-09-04 | End: 2018-10-03

## 2018-09-04 RX ORDER — LACTOBACILLUS ACIDOPHILUS 100MM CELL
1 CAPSULE ORAL
Qty: 90 | Refills: 0 | OUTPATIENT
Start: 2018-09-04 | End: 2018-10-03

## 2018-09-04 RX ORDER — METOPROLOL TARTRATE 50 MG
1 TABLET ORAL
Qty: 60 | Refills: 0 | OUTPATIENT
Start: 2018-09-04 | End: 2018-10-03

## 2018-09-04 RX ADMIN — Medication 1 TABLET(S): at 11:49

## 2018-09-04 RX ADMIN — Medication 1 CAPSULE(S): at 06:04

## 2018-09-04 RX ADMIN — CARVEDILOL PHOSPHATE 6.25 MILLIGRAM(S): 80 CAPSULE, EXTENDED RELEASE ORAL at 06:04

## 2018-09-04 RX ADMIN — Medication 1 CAPSULE(S): at 13:08

## 2018-09-04 RX ADMIN — Medication 100 MILLIGRAM(S): at 06:05

## 2018-09-04 RX ADMIN — PANTOPRAZOLE SODIUM 40 MILLIGRAM(S): 20 TABLET, DELAYED RELEASE ORAL at 06:05

## 2018-09-04 RX ADMIN — Medication 1 TABLET(S): at 08:01

## 2018-09-04 NOTE — DISCHARGE NOTE ADULT - MEDICATION SUMMARY - MEDICATIONS TO STOP TAKING
I will STOP taking the medications listed below when I get home from the hospital:    carvedilol 6.25 mg oral tablet  -- 1 tab(s) by mouth 2 times a day

## 2018-09-04 NOTE — DISCHARGE NOTE ADULT - HOSPITAL COURSE
73M PMH recently diagnosed metastatic pancreatic adenocarcinoma s/p biliary stent (mets to liver, started Gemzar/abraxane 8/29/18), afib on xarelto, HTN presents with fever, generalized weakness, and poor appetite for one day. Likely septic, however source unclear at this time. With near-hypotension and afib RVR, rate somewhat improved after IVF resuscitation.       Fever. --Immunocompromised patient with fever, elevated HR, low BP, signs consistent with sepsis.  --source of infection unclear at this time. No focal symptoms, recent paracentesis, started chemo two days ago.   --UCx and BCx result pending   --CXR with small L pleural effusion (possibly from hypoalbuminemia), R base atelectasis, but likely related to compressive atelectasis from ascites.    DCP with med rec discussed with Dr Frederick PT medically cleared for DC home with no skilled needs   Follow ups with Dr Lizette Frausto 73M PMH recently diagnosed metastatic pancreatic adenocarcinoma s/p biliary stent (mets to liver, started Gemzar/abraxane 8/29/18), afib on xarelto, HTN presents with fever, generalized weakness, and poor appetite for one day. Likely septic, however source unclear at this time. With near-hypotension and afib RVR, rate somewhat improved after IVF resuscitation.       Fever. --Immunocompromised patient with fever, elevated HR, low BP, signs consistent with sepsis.  --source of infection unclear at this time. No focal symptoms, recent paracentesis, started chemo two days ago.   --UCx and BCx result negative    --CXR with small L pleural effusion (possibly from hypoalbuminemia), R base atelectasis, but likely related to compressive atelectasis from ascites.    DCP with med rec discussed with Dr Frederick PT medically cleared for DC home with no skilled needs   Follow ups with Dr Lizette Frausto 73M PMH recently diagnosed metastatic pancreatic adenocarcinoma s/p biliary stent (mets to liver, started Gemzar/abraxane 8/29/18), afib on xarelto, HTN presents with fever, generalized weakness, and poor appetite for one day. Likely septic, however source unclear at this time. With near-hypotension and afib RVR, rate somewhat improved after IVF resuscitation.       Fever. --Immunocompromised patient with fever, elevated HR, low BP, signs consistent with sepsis.  --source of infection unclear at this time. No focal symptoms, recent paracentesis, started chemo two days ago.   ID consulted empirically tx with zosyn afebrile zosyn dc'd no s/s of infection    UCx and BCx result negative    CXR with small L pleural effusion (possibly from hypoalbuminemia), R base atelectasis, but likely related to compressive atelectasis from ascites.    DCP with med rec discussed with Dr Frederick PT medically cleared for DC home with no skilled needs   Follow ups with Dr Lizette Frausto

## 2018-09-04 NOTE — PROGRESS NOTE ADULT - PROVIDER SPECIALTY LIST ADULT
Cardiology
Cardiology
Heme/Onc
Infectious Disease
Internal Medicine

## 2018-09-04 NOTE — DISCHARGE NOTE ADULT - CARE PROVIDERS DIRECT ADDRESSES
[email or web address]."[email or web address], Mobile [email or web address], Mobile [email or web address]

## 2018-09-04 NOTE — DISCHARGE NOTE ADULT - PLAN OF CARE
PT free from s/s of infection S/p paracentesis on going treatment Follow up with your medical doctor to establish long term blood pressure treatment goals. Atrial fibrillation is the most common heart rhythm problem & has the risk of stroke & heart attack  It helps if you control your blood pressure, not drink more than 1-2 alcohol drinks per day, cut down on   caffeine, getting treatment for over active thyroid gland, & getting exercise    Call your doctor if you feel your heart racing or beating unusually, chest tightness or pain, lightheaded, faint, shortness of breath especially with exercise  It is important to take your heart medication as prescribed

## 2018-09-04 NOTE — DISCHARGE NOTE ADULT - CARE PROVIDER_API CALL
Ryan Bauer), Hematology; Internal Medicine; Medical Oncology  [address] [place] [address]  Phone: [phone]  Fax: [phone]    Pratik Frausto), Gastroenterology; Internal Medicine  [address] [place] [id number]  Phone: [phone]  Fax: [phone]

## 2018-09-04 NOTE — DISCHARGE NOTE ADULT - MEDICATION SUMMARY - MEDICATIONS TO TAKE
I will START or STAY ON the medications listed below when I get home from the hospital:    Xarelto 20 mg oral tablet  -- 1 tab(s) by mouth once a day  -- Indication: For Atrial fibrillation with RVR    Reglan 5 mg oral tablet  -- 1 tab(s) by mouth 4 times a day (before meals and at bedtime), As Needed  -- Indication: For Nausea     Lopressor 50 mg oral tablet  -- 1 tab(s) by mouth 2 times a day   -- It is very important that you take or use this exactly as directed.  Do not skip doses or discontinue unless directed by your doctor.  May cause drowsiness.  Alcohol may intensify this effect.  Use care when operating dangerous machinery.  Some non-prescription drugs may aggravate your condition.  Read all labels carefully.  If a warning appears, check with your doctor before taking.  Take with food or milk.  This drug may impair the ability to drive or operate machinery.  Use care until you become familiar with its effects.    -- Indication: For Atrial fibrillation with RVR    pancrelipase 12,000 units-38,000 units-60,000 units oral delayed release capsule  -- 1 cap(s) by mouth 3 times a day  -- Indication: For Pancreatic cancer    Creon 12,000 units oral delayed release capsule  -- 1-2 cap(s) by mouth 3 times a day before meals  -- Indication: For enzyme     senna oral tablet  -- 2 tab(s) by mouth once a day (at bedtime)  -- Indication: For Bowel rgimen     lactobacillus acidophilus oral capsule  -- 1 cap(s) by mouth 3 times a day   -- Indication: For Prophylaxis     pantoprazole 40 mg oral delayed release tablet  -- 1 tab(s) by mouth once a day   -- It is very important that you take or use this exactly as directed.  Do not skip doses or discontinue unless directed by your doctor.  Obtain medical advice before taking any non-prescription drugs as some may affect the action of this medication.  Swallow whole.  Do not crush.    -- Indication: For Gerd

## 2018-09-04 NOTE — DISCHARGE NOTE ADULT - PATIENT PORTAL LINK FT
You can access the [facility] Patient Portal, offered by [facility], by registering with the following website: [email or web address]

## 2018-09-04 NOTE — PROGRESS NOTE ADULT - SUBJECTIVE AND OBJECTIVE BOX
CC: f/u for fever    Patient reports comfortable, no change in distention, no resp Sxs    REVIEW OF SYSTEMS:  All other review of systems negative (Comprehensive ROS)    Antimicrobials Day # 3    piperacillin/tazobactam IVPB. 3.375 Gram(s) IV Intermittent every 8 hours    Other Medications Reviewed    T(F): 98.1 (18 @ 11:31), Max: 101 (18 @ 21:58)  HR: 102 (18 @ 11:31)  BP: 101/65 (18 @ 11:31)  RR: 17 (18 @ 11:31)  SpO2: 95% (18 @ 11:31)  Wt(kg): --    PHYSICAL EXAM:  General: alert, no acute distress  Eyes:  anicteric, no conjunctival injection, no discharge  Oropharynx: no lesions or injection 	  Neck: supple, without adenopathy  Lungs: clear to auscultation  Heart: regular rate and rhythm; no murmur, rubs or gallops  Abdomen: soft, distended, nontender, without mass or organomegaly  Skin: no lesions  Extremities: no clubbing, cyanosis, or edema  Neurologic: alert, oriented, moves all extremities    LAB RESULTS:                        9.6    7.63  )-----------( 217      ( 02 Sep 2018 08:03 )             27.9         138  |  107  |  11  ----------------------------<  130<H>  3.8   |  21<L>  |  0.71    Ca    7.6<L>      02 Sep 2018 06:44  Phos  2.7       Mg     1.7         TPro  5.4<L>  /  Alb  2.3<L>  /  TBili  3.8<H>  /  DBili  x   /  AST  36  /  ALT  23  /  AlkPhos  110      LIVER FUNCTIONS - ( 01 Sep 2018 09:53 )  Alb: 2.3 g/dL / Pro: 5.4 g/dL / ALK PHOS: 110 U/L / ALT: 23 U/L / AST: 36 U/L / GGT: x           Urinalysis Basic - ( 31 Aug 2018 20:52 )    Color: Brown / Appearance: SL Turbid / S.026 / pH: x  Gluc: x / Ketone: Negative  / Bili: Small / Urobili: >8 mg/dL   Blood: x / Protein: 100 mg/dL / Nitrite: Negative   Leuk Esterase: Negative / RBC: 5-10 /HPF / WBC 5-10 /HPF   Sq Epi: x / Non Sq Epi: x / Bacteria: Few /HPF      MICROBIOLOGY:  RECENT CULTURES:   @ 22:15 .Blood Blood-Peripheral     No growth to date.     @ 22:06 .Urine Clean Catch (Midstream)     No growth      RADIOLOGY REVIEWED:  CT Abdomen and Pelvis w/ IV Cont (18 @ 00:02) >  Pancreatic malignancy with peritoneal carcinomatosis and   hepatic metastases.  There is encasement of the celiac artery and hepatic artery.  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  A patent biliary stent is present.
CC: f/u for fever resolved     Patient sitting in chair awake and alert reports feeling OK     REVIEW OF SYSTEMS:  All other review of systems negative (Comprehensive ROS)    Antimicrobials Day # 3    piperacillin/tazobactam IVPB. 3.375 Gram(s) IV Intermittent every 8 hours    Other Medications Reviewed    Vital Signs Last 24 Hrs  T(C): 37.1 (03 Sep 2018 04:14), Max: 37.1 (03 Sep 2018 04:14)  T(F): 98.8 (03 Sep 2018 04:14), Max: 98.8 (03 Sep 2018 04:14)  HR: 114 (03 Sep 2018 04:14) (102 - 134)  BP: 104/67 (03 Sep 2018 04:14) (101/65 - 122/83)  BP(mean): --  RR: 18 (03 Sep 2018 04:14) (17 - 18)  SpO2: 95% (03 Sep 2018 04:14) (95% - 98%)    PHYSICAL EXAM:  General: alert, no acute distress  Eyes:  anicteric, no conjunctival injection, no discharge  Oropharynx: no lesions or injection 	  Neck: supple, without adenopathy  Lungs: clear to auscultation  Heart: regular rate and rhythm; no murmur, rubs or gallops  Abdomen: soft, distended, nontender, without mass or organomegaly  Skin: no lesions  Extremities: no clubbing, cyanosis, or edema  Neurologic: alert, oriented, moves all extremities    LAB RESULTS:                        10.1   4.33  )-----------( 196      ( 03 Sep 2018 09:38 )             30.0                           9.6    7.63  )-----------( 217      ( 02 Sep 2018 08:03 )             27.9     09-02    138  |  107  |  11  ----------------------------<  130<H>  3.8   |  21<L>  |  0.71    Ca    7.6<L>      02 Sep 2018 06:44  Phos  2.7     09-  Mg     1.7     -    TPro  5.4<L>  /  Alb  2.3<L>  /  TBili  3.8<H>  /  DBili  x   /  AST  36  /  ALT  23  /  AlkPhos  110  09-    LIVER FUNCTIONS - ( 01 Sep 2018 09:53 )  Alb: 2.3 g/dL / Pro: 5.4 g/dL / ALK PHOS: 110 U/L / ALT: 23 U/L / AST: 36 U/L / GGT: x           Urinalysis Basic - ( 31 Aug 2018 20:52 )    Color: Brown / Appearance: SL Turbid / S.026 / pH: x  Gluc: x / Ketone: Negative  / Bili: Small / Urobili: >8 mg/dL   Blood: x / Protein: 100 mg/dL / Nitrite: Negative   Leuk Esterase: Negative / RBC: 5-10 /HPF / WBC 5-10 /HPF   Sq Epi: x / Non Sq Epi: x / Bacteria: Few /HPF      MICROBIOLOGY:  RECENT CULTURES:   @ 22:15 .Blood Blood-Peripheral     No growth to date.     @ 22:06 .Urine Clean Catch (Midstream)     No growth      RADIOLOGY REVIEWED:  CT Abdomen and Pelvis w/ IV Cont (18 @ 00:02) >  Pancreatic malignancy with peritoneal carcinomatosis and   hepatic metastases.  There is encasement of the celiac artery and hepatic artery.  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  A patent biliary stent is present.
CC: f/u for fever resolved     Patient sitting in chair awake and alert reports feeling OK he has no complaints he wants to go home     REVIEW OF SYSTEMS:  All other review of systems negative (Comprehensive ROS)      Other Medications Reviewed    Vital Signs Last 24 Hrs  T(C): 36.6 (04 Sep 2018 11:12), Max: 37.2 (03 Sep 2018 21:02)  T(F): 97.8 (04 Sep 2018 11:12), Max: 99 (03 Sep 2018 21:02)  HR: 109 (04 Sep 2018 11:12) (109 - 123)  BP: 99/62 (04 Sep 2018 11:12) (93/60 - 106/60)  BP(mean): --  RR: 17 (04 Sep 2018 11:12) (17 - 18)  SpO2: 94% (04 Sep 2018 11:12) (93% - 95%)    PHYSICAL EXAM:  General: alert, no acute distress  Eyes:  anicteric, no conjunctival injection, no discharge  Oropharynx: no lesions or injection 	  Neck: supple, without adenopathy  Lungs: clear to auscultation  Heart: regular rate and rhythm; no murmur, rubs or gallops  Abdomen: soft, distended, nontender, without mass or organomegaly  Skin: no lesions  Extremities: no clubbing, cyanosis, or edema  Neurologic: alert, oriented, moves all extremities    LAB RESULTS:                        9.7    5.42  )-----------( 192      ( 04 Sep 2018 07:54 )             28.8                           10.1   4.33  )-----------( 196      ( 03 Sep 2018 09:38 )             30.0                           9.6    7.63  )-----------( 217      ( 02 Sep 2018 08:03 )             27.9     09-02    138  |  107  |  11  ----------------------------<  130<H>  3.8   |  21<L>  |  0.71    Ca    7.6<L>      02 Sep 2018 06:44  Phos  2.7     09-02  Mg     1.7     09-02    TPro  5.4<L>  /  Alb  2.3<L>  /  TBili  3.8<H>  /  DBili  x   /  AST  36  /  ALT  23  /  AlkPhos  110  09-01    LIVER FUNCTIONS - ( 01 Sep 2018 09:53 )  Alb: 2.3 g/dL / Pro: 5.4 g/dL / ALK PHOS: 110 U/L / ALT: 23 U/L / AST: 36 U/L / GGT: x           Urinalysis Basic - ( 31 Aug 2018 20:52 )    Color: Brown / Appearance: SL Turbid / S.026 / pH: x  Gluc: x / Ketone: Negative  / Bili: Small / Urobili: >8 mg/dL   Blood: x / Protein: 100 mg/dL / Nitrite: Negative   Leuk Esterase: Negative / RBC: 5-10 /HPF / WBC 5-10 /HPF   Sq Epi: x / Non Sq Epi: x / Bacteria: Few /HPF      MICROBIOLOGY:  RECENT CULTURES:   @ 22:15 .Blood Blood-Peripheral     No growth to date.     @ 22:06 .Urine Clean Catch (Midstream)     No growth      RADIOLOGY REVIEWED:  CT Abdomen and Pelvis w/ IV Cont (18 @ 00:02) >  Pancreatic malignancy with peritoneal carcinomatosis and   hepatic metastases.  There is encasement of the celiac artery and hepatic artery.  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  A patent biliary stent is present.
INTERVAL HPI/OVERNIGHT EVENTS:  Patient S&E at bedside. The patient had fever last evening to 101. According to family ice packs under the arms were used, He feels tired today    VITAL SIGNS:  T(F): 98.1 (18 @ 11:31)  HR: 102 (18 @ 11:31)  BP: 101/65 (18 @ 11:31)  RR: 17 (18 @ 11:31)  SpO2: 95% (18 @ 11:31)  Wt(kg): --    PHYSICAL EXAM:    Constitutional: NADsleeping; appears tired  Eyes: EOMI, sclera non-icteric  Neck: supple, no masses, no JVD  Respiratory: CTA b/l, good air entry b/l  Cardiovascular: RRR, no M/R/G  Gastrointestinal: soft, NTND, no masses palpable, + BS, no hepatosplenomegaly mild fluid accumulation ascities non tender abdomen  Extremities: no c/c/e  Neurological: AAOx3      MEDICATIONS  (STANDING):  amylase/lipase/protease  (CREON 12,000 Units) 1 Capsule(s) Oral three times a day  docusate sodium 100 milliGRAM(s) Oral two times a day  lactobacillus acidophilus 1 Tablet(s) Oral three times a day with meals  metoprolol tartrate 25 milliGRAM(s) Oral two times a day  pantoprazole    Tablet 40 milliGRAM(s) Oral before breakfast  piperacillin/tazobactam IVPB. 3.375 Gram(s) IV Intermittent every 8 hours  rivaroxaban 20 milliGRAM(s) Oral every 24 hours  senna 2 Tablet(s) Oral at bedtime  sodium chloride 0.9%. 1000 milliLiter(s) (75 mL/Hr) IV Continuous <Continuous>    MEDICATIONS  (PRN):  acetaminophen   Tablet 650 milliGRAM(s) Oral every 6 hours PRN For Temp greater than 38 C (100.4 F)  metoclopramide Injectable 10 milliGRAM(s) IV Push every 8 hours PRN nausea and vomiting      Allergies    No Known Allergies    Intolerances        LABS:                        9.6    7.63  )-----------( 217      ( 02 Sep 2018 08:03 )             27.9     -    138  |  107  |  11  ----------------------------<  130<H>  3.8   |  21<L>  |  0.71    Ca    7.6<L>      02 Sep 2018 06:44  Phos  2.7       Mg     1.7         TPro  5.4<L>  /  Alb  2.3<L>  /  TBili  3.8<H>  /  DBili  x   /  AST  36  /  ALT  23  /  AlkPhos  110      PT/INR - ( 01 Sep 2018 10:27 )   PT: 15.8 sec;   INR: 1.39 ratio         PTT - ( 01 Sep 2018 10:27 )  PTT:34.7 sec  Urinalysis Basic - ( 31 Aug 2018 20:52 )    Color: Brown / Appearance: SL Turbid / S.026 / pH: x  Gluc: x / Ketone: Negative  / Bili: Small / Urobili: >8 mg/dL   Blood: x / Protein: 100 mg/dL / Nitrite: Negative   Leuk Esterase: Negative / RBC: 5-10 /HPF / WBC 5-10 /HPF   Sq Epi: x / Non Sq Epi: x / Bacteria: Few /HPF        RADIOLOGY & ADDITIONAL TESTS:  Studies reviewed.
Patient is a 73y old  Male who presents with a chief complaint of fever (04 Sep 2018 12:22)      SUBJECTIVE / OVERNIGHT EVENTS:  Review of Systems  chest pain no  palpitations no  sob no  nausea no  headache no    MEDICATIONS  (STANDING):  amylase/lipase/protease  (CREON 12,000 Units) 1 Capsule(s) Oral three times a day  carvedilol 6.25 milliGRAM(s) Oral every 12 hours  docusate sodium 100 milliGRAM(s) Oral two times a day  lactobacillus acidophilus 1 Tablet(s) Oral three times a day with meals  pantoprazole    Tablet 40 milliGRAM(s) Oral before breakfast  rivaroxaban 20 milliGRAM(s) Oral every 24 hours  senna 2 Tablet(s) Oral at bedtime  sodium chloride 0.9%. 1000 milliLiter(s) (75 mL/Hr) IV Continuous <Continuous>    MEDICATIONS  (PRN):  acetaminophen   Tablet 650 milliGRAM(s) Oral every 6 hours PRN For Temp greater than 38 C (100.4 F)  metoclopramide Injectable 10 milliGRAM(s) IV Push every 8 hours PRN nausea and vomiting      Vital Signs Last 24 Hrs  T(C): 36.6 (04 Sep 2018 11:12), Max: 37.2 (03 Sep 2018 21:02)  T(F): 97.8 (04 Sep 2018 11:12), Max: 99 (03 Sep 2018 21:02)  HR: 109 (04 Sep 2018 11:12) (109 - 123)  BP: 99/62 (04 Sep 2018 11:12) (93/60 - 106/60)  BP(mean): --  RR: 17 (04 Sep 2018 11:12) (17 - 18)  SpO2: 94% (04 Sep 2018 11:12) (93% - 95%)    PHYSICAL EXAM:  GENERAL: NAD, well-developed  HEAD:  Atraumatic, Normocephalic  EYES: EOMI, PERRLA, conjunctiva and sclera clear  NECK: Supple, No JVD  CHEST/LUNG: Clear to auscultation bilaterally; No wheeze  HEART: Regular rate and rhythm; No murmurs, rubs, or gallops  ABDOMEN: Soft, Nontender, distended +ascites; Bowel sounds present  EXTREMITIES:  2+ Peripheral Pulses, No clubbing, cyanosis, or edema  PSYCH: AAOx3  NEUROLOGY: non-focal  SKIN: No rashes or lesions    LABS:                        9.7    5.42  )-----------( 192      ( 04 Sep 2018 07:54 )             28.8     09-04    137  |  107  |  12  ----------------------------<  120<H>  3.4<L>   |  21<L>  |  0.69    Ca    7.6<L>      04 Sep 2018 06:42  Phos  2.9     09-03  Mg     1.8     09-03                  RADIOLOGY & ADDITIONAL TESTS:    Imaging Personally Reviewed:    Consultant(s) Notes Reviewed:      Care Discussed with Consultants/Other Providers:
Patient is a 73y old  Male who presents with a chief complaint of fever (31 Aug 2018 22:36)      SUBJECTIVE / OVERNIGHT EVENTS: weak, tired  Review of Systems  chest pain no  palpitations no  sob no  nausea no  headache no    MEDICATIONS  (STANDING):  amylase/lipase/protease  (CREON 12,000 Units) 1 Capsule(s) Oral three times a day  carvedilol 6.25 milliGRAM(s) Oral every 12 hours  docusate sodium 100 milliGRAM(s) Oral two times a day  lactobacillus acidophilus 1 Tablet(s) Oral three times a day with meals  pantoprazole    Tablet 40 milliGRAM(s) Oral before breakfast  rivaroxaban 20 milliGRAM(s) Oral every 24 hours  senna 2 Tablet(s) Oral at bedtime  sodium chloride 0.9%. 1000 milliLiter(s) (75 mL/Hr) IV Continuous <Continuous>    MEDICATIONS  (PRN):  acetaminophen   Tablet 650 milliGRAM(s) Oral every 6 hours PRN For Temp greater than 38 C (100.4 F)  metoclopramide Injectable 10 milliGRAM(s) IV Push every 8 hours PRN nausea and vomiting      Vital Signs Last 24 Hrs  T(C): 36.4 (03 Sep 2018 11:19), Max: 37.1 (03 Sep 2018 04:14)  T(F): 97.6 (03 Sep 2018 11:19), Max: 98.8 (03 Sep 2018 04:14)  HR: 99 (03 Sep 2018 11:19) (99 - 134)  BP: 108/73 (03 Sep 2018 11:19) (104/67 - 122/83)  BP(mean): --  RR: 17 (03 Sep 2018 11:19) (17 - 18)  SpO2: 96% (03 Sep 2018 11:19) (95% - 98%)    PHYSICAL EXAM:  GENERAL: NAD  HEAD:  Atraumatic, Normocephalic  EYES: EOMI, PERRLA, conjunctiva and sclera clear  NECK: Supple, No JVD  CHEST/LUNG: Clear to auscultation bilaterally; No wheeze  HEART: Regular rate and rhythm; No murmurs, rubs, or gallops  ABDOMEN: Soft, Nontender,distended;+ascites Bowel sounds present  EXTREMITIES:  2+ Peripheral Pulses, No clubbing, cyanosis, or edema  PSYCH: AAOx3  NEUROLOGY: non-focal  SKIN: No rashes or lesions    LABS:                        10.1   4.33  )-----------( 196      ( 03 Sep 2018 09:38 )             30.0     09-03    136  |  104  |  11  ----------------------------<  123<H>  3.8   |  21<L>  |  0.72    Ca    8.1<L>      03 Sep 2018 07:17  Phos  2.9     09-03  Mg     1.8     09-03                Culture - Blood (collected 31 Aug 2018 22:15)  Source: .Blood Blood-Peripheral  Preliminary Report (01 Sep 2018 23:01):    No growth to date.    Culture - Blood (collected 31 Aug 2018 22:15)  Source: .Blood Blood-Peripheral  Preliminary Report (01 Sep 2018 23:01):    No growth to date.    Culture - Urine (collected 31 Aug 2018 22:06)  Source: .Urine Clean Catch (Midstream)  Final Report (01 Sep 2018 22:11):    No growth        RADIOLOGY & ADDITIONAL TESTS:    Imaging Personally Reviewed:    Consultant(s) Notes Reviewed:      Care Discussed with Consultants/Other Providers:
Patient is a 73y old  Male who presents with a chief complaint of fever (31 Aug 2018 22:36)    Admitted overnight by hospitalist service   SUBJECTIVE / OVERNIGHT EVENTS: Comfortable without new complaints. Family at bedside.   Review of Systems  chest pain no  palpitations no  sob no  nausea no  headache no    MEDICATIONS  (STANDING):  amylase/lipase/protease  (CREON 12,000 Units) 1 Capsule(s) Oral three times a day  lactobacillus acidophilus 1 Tablet(s) Oral three times a day with meals  metoprolol tartrate 25 milliGRAM(s) Oral two times a day  pantoprazole    Tablet 40 milliGRAM(s) Oral before breakfast  piperacillin/tazobactam IVPB. 3.375 Gram(s) IV Intermittent every 8 hours  rivaroxaban 20 milliGRAM(s) Oral every 24 hours  vancomycin  IVPB 1000 milliGRAM(s) IV Intermittent every 12 hours    MEDICATIONS  (PRN):  acetaminophen   Tablet 650 milliGRAM(s) Oral every 6 hours PRN For Temp greater than 38 C (100.4 F)  metoclopramide Injectable 10 milliGRAM(s) IV Push every 8 hours PRN nausea and vomiting      Vital Signs Last 24 Hrs  T(C): 37.3 (01 Sep 2018 02:53), Max: 37.6 (31 Aug 2018 19:18)  T(F): 99.1 (01 Sep 2018 02:53), Max: 99.6 (31 Aug 2018 19:18)  HR: 123 (01 Sep 2018 07:58) (100 - 139)  BP: 97/68 (01 Sep 2018 07:58) (97/68 - 110/70)  BP(mean): --  RR: 17 (01 Sep 2018 07:58) (17 - 24)  SpO2: 95% (01 Sep 2018 07:58) (94% - 96%)    PHYSICAL EXAM:  GENERAL: NAD, well-developed  HEAD:  Atraumatic, Normocephalic  EYES: EOMI, PERRLA, conjunctiva and sclera clear  NECK: Supple, No JVD  CHEST/LUNG: Clear to auscultation bilaterally; No wheeze  HEART: Regular rate and rhythm; No murmurs, rubs, or gallops  ABDOMEN: Soft, Nontender, Nondistended; Bowel sounds present  EXTREMITIES:  2+ Peripheral Pulses, No clubbing, cyanosis, or edema  PSYCH: AAOx3  NEUROLOGY: non-focal  SKIN: No rashes or lesions    LABS:                        11.0   15.7  )-----------( 284      ( 31 Aug 2018 20:52 )             34.0         138  |  101  |  15  ----------------------------<  126<H>  3.7   |  24  |  0.83    Ca    8.1<L>      31 Aug 2018 20:52    TPro  5.8<L>  /  Alb  2.6<L>  /  TBili  5.3<H>  /  DBili  x   /  AST  35  /  ALT  24  /  AlkPhos  130<H>      PT/INR - ( 31 Aug 2018 20:52 )   PT: 15.4 sec;   INR: 1.42 ratio         PTT - ( 31 Aug 2018 20:52 )  PTT:30.5 sec      Urinalysis Basic - ( 31 Aug 2018 20:52 )    Color: Brown / Appearance: SL Turbid / S.026 / pH: x  Gluc: x / Ketone: Negative  / Bili: Small / Urobili: >8 mg/dL   Blood: x / Protein: 100 mg/dL / Nitrite: Negative   Leuk Esterase: Negative / RBC: 5-10 /HPF / WBC 5-10 /HPF   Sq Epi: x / Non Sq Epi: x / Bacteria: Few /HPF          RADIOLOGY & ADDITIONAL TESTS:    Imaging Personally Reviewed:    Consultant(s) Notes Reviewed:      Care Discussed with Consultants/Other Providers:
· Requested by Name:	tigre	  · Date/Time:	03-Sep-2018 05:38	  · Reason for Referral/Consultation:	afib rvr	      · Subjective and Objective: 	  Initial Cardiology Attending Consult    CHIEF COMPLAINT: fever      HISTORY OF PRESENT ILLNESS:  ROYCE NORMAN is a 73y Male patient PMH metastatic pancreatic adenocarcinoma s/p biliary stent (mets to liver, started Gemzar/abraxane 18), afib on xarelto, HTN presents with fever, generalized weakness, and poor appetite for one day .  He started chemo two days ago. Had paracentesis performed yesterday with 5500 cc removed.   Saturday morning, he felt significant malaise when he woke up. + anorexia.  Then developed fever up to 101 at home. Patient denies focal complaints besides poor appetite. No chest pain, SOB, abdominal pain, dizziness, abdominal pain, dysuria, hematuria, diarrhea, rashes. No sick contacts.   Allergies    No Known Allergies    Intolerances  ===================  Interval Events  - AF 140s gradually coming down to 90s-100s  - No reported worsening CP/Palps/SOB      ================  	    PAST MEDICAL & SURGICAL HISTORY:  Pancreatic cancer: metastatic to liver  HTN (hypertension)  Afib  History of coronary artery stent placement  Carpal tunnel syndrome  S/P abdominal paracentesis: aug 30, 2018  H/O knee surgery      FAMILY HISTORY:  Family history of liver cancer (Mother): In mother,  in her 50&#x27;s      SOCIAL HISTORY:    [x ] Non-smoker  [ ] Smoker  [ ] Alcohol      REVIEW OF SYSTEMS:  CONSTITUTIONAL: +fever, weight loss, +fatigue  EYES: No eye pain, visual disturbances, or discharge  ENMT:  No difficulty hearing, tinnitus, vertigo; No sinus or throat pain  NECK: No pain or stiffness  RESPIRATORY: No cough, wheezing, chills or hemoptysis; No Shortness of Breath  CARDIOVASCULAR: No chest pain, palpitations, passing out, dizziness, or leg swelling  GASTROINTESTINAL: No abdominal or epigastric pain. No nausea, vomiting, or hematemesis; No diarrhea or constipation. No melena or hematochezia.  GENITOURINARY: No dysuria, frequency, hematuria, or incontinence  NEUROLOGICAL: No headaches, memory loss, loss of strength, numbness, or tremors  SKIN: No itching, burning, rashes, or lesions   LYMPH Nodes: No enlarged glands  ENDOCRINE: No heat or cold intolerance; No hair loss  MUSCULOSKELETAL: No joint pain or swelling; No muscle, back, or extremity pain  PSYCHIATRIC: No depression, anxiety, mood swings, or difficulty sleeping  HEME/LYMPH: No easy bruising, or bleeding gums  ALLERY AND IMMUNOLOGIC: No hives or eczema	    [ ] All others negative	  [ ] Unable to obtain    PHYSICAL EXAM:  I&O's Summary    01 Sep 2018 07:01  -  02 Sep 2018 05:38  --------------------------------------------------------  IN: 600 mL / OUT: 300 mL / NET: 300 mL    Vital Signs Last 24 Hrs  T(C): 36.9 (02 Sep 2018 01:10), Max: 38.3 (01 Sep 2018 21:58)  T(F): 98.4 (02 Sep 2018 01:10), Max: 101 (01 Sep 2018 21:58)  HR: 110 (02 Sep 2018 04:00) (110 - 154)  BP: 104/68 (02 Sep 2018 04:00) (89/58 - 109/70)  BP(mean): --  RR: 18 (02 Sep 2018 01:10) (16 - 18)  SpO2: 95% (02 Sep 2018 01:10) (95% - 95%)    Appearance: Normal	  HEENT:   Normal oral mucosa, PERRL, EOMI	  Lymphatic: No lymphadenopathy  Cardiovascular: irreg, tachycardic,, No JVD, No murmurs, No edema  Respiratory: Lungs clear to auscultation	  Psychiatry: A & O x 3, Mood & affect appropriate  Gastrointestinal:   Skin: No rashes, No ecchymoses, No cyanosis	  Neurologic: Non-focal  Extremities: Normal range of motion, No clubbing, cyanosis or edema  Vascular: Peripheral pulses palpable 2+ bilaterally    MEDICATIONS:  Home Medications:  carvedilol 6.25 mg oral tablet: 1 tab(s) orally 2 times a day (31 Aug 2018 23:09)  Creon 12,000 units oral delayed release capsule: 1-2 cap(s) orally 3 times a day before meals (31 Aug 2018 23:09)  Reglan 5 mg oral tablet: 1 tab(s) orally 4 times a day (before meals and at bedtime), As Needed (31 Aug 2018 23:09)  Xarelto 20 mg oral tablet: 1 tab(s) orally once a day (31 Aug 2018 23:09)    MEDICATIONS  (STANDING):  amylase/lipase/protease  (CREON 12,000 Units) 1 Capsule(s) Oral three times a day  lactobacillus acidophilus 1 Tablet(s) Oral three times a day with meals  metoprolol tartrate 25 milliGRAM(s) Oral two times a day  pantoprazole    Tablet 40 milliGRAM(s) Oral before breakfast  piperacillin/tazobactam IVPB. 3.375 Gram(s) IV Intermittent every 8 hours  rivaroxaban 20 milliGRAM(s) Oral every 24 hours  sodium chloride 0.9%. 1000 milliLiter(s) (75 mL/Hr) IV Continuous <Continuous>    MEDICATIONS  (PRN):  acetaminophen   Tablet 650 milliGRAM(s) Oral every 6 hours PRN For Temp greater than 38 C (100.4 F)  metoclopramide Injectable 10 milliGRAM(s) IV Push every 8 hours PRN nausea and vomiting      LABS:	 	Personally Labs Personally Reviewed 0- 9/3/2018    CBC Full  -  ( 02 Sep 2018 02:46 )  WBC Count : 9.1 K/uL  Hemoglobin : 9.6 g/dL  Hematocrit : 29.4 %  Platelet Count - Automated : 214 K/uL  Mean Cell Volume : 101.0 fl  Mean Cell Hemoglobin : 32.9 pg  Mean Cell Hemoglobin Concentration : 32.7 gm/dL  Auto Neutrophil # : 8.4 K/uL  Auto Lymphocyte # : 0.5 K/uL  Auto Monocyte # : 0.1 K/uL  Auto Eosinophil # : 0.1 K/uL  Auto Basophil # : 0.0 K/uL  Auto Neutrophil % : 92.6 %  Auto Lymphocyte % : 5.3 %  Auto Monocyte % : 0.6 %  Auto Eosinophil % : 1.4 %  Auto Basophil % : 0.1 %        132<L>  |  103  |  11  ----------------------------<  145<H>  3.7   |  22  |  0.74  09-    136  |  104  |  12  ----------------------------<  159<H>  3.7   |  24  |  0.75    Ca    7.7<L>      02 Sep 2018 02:46  Ca    8.1<L>      01 Sep 2018 09:53  Phos  2.7       Mg     1.7         TPro  5.4<L>  /  Alb  2.3<L>  /  TBili  3.8<H>  /  DBili  x   /  AST  36  /  ALT  23  /  AlkPhos  110    TPro  5.8<L>  /  Alb  2.6<L>  /  TBili  5.3<H>  /  DBili  x   /  AST  35  /  ALT  24  /  AlkPhos  130<H>        proBNP:   Lipid Profile:   HgA1c:   TSH:     TROPONIN:        TELEMETRY: Afib 150s trending down to 110	    ECG:  	Afib RVR  RADIOLOGY:  OTHER: 	    CARDIAC TESTING/STUDIES:    [ ] Echocardiogram:  [ ]  Catheterization:  [ ] Stress Test:  	  	  ASSESSMENT/PLAN: 	  ROYCE NORMAN is a 73y Male patient PMH metastatic pancreatic adenocarcinoma s/p biliary stent (mets to liver, started Gemzar/abraxane 18), afib on xarelto, HTN presents with fever, generalized weakness, and poor appetite for one day .    upon presentation he had fever and Afib RVR.  As his fever has improved his HR has trended down with IVF and metoprolol, continue metoprolol for HR control, uptitrate metoprolol as needed for optimal HR control  monitor on tele  continue to treat underlying fever  Check TTE
· Requested by Name:	tigre	  · Date/Time:	03-Sep-2018 05:38	  · Reason for Referral/Consultation:	afib rvr	      · Subjective and Objective: 	  Initial Cardiology Attending Consult    CHIEF COMPLAINT: fever      HISTORY OF PRESENT ILLNESS:  ROYCE NORMAN is a 73y Male patient PMH metastatic pancreatic adenocarcinoma s/p biliary stent (mets to liver, started Gemzar/abraxane 18), afib on xarelto, HTN presents with fever, generalized weakness, and poor appetite for one day .  He started chemo two days prior to admission. Had paracentesis performed recently with 5500 cc removed.   Saturday morning, he felt significant malaise when he woke up. + anorexia.  Then developed fever up to 101 at home. Patient denies focal complaints besides poor appetite. No chest pain, SOB, abdominal pain, dizziness, abdominal pain, dysuria, hematuria, diarrhea, rashes. No sick contacts.   Allergies    No Known Allergies    Intolerances  ===================  Interval Events  metoprolo dc, coreg restarted  - -160  - No reported worsening CP/Palps/SOB      ================  	    PAST MEDICAL & SURGICAL HISTORY:  Pancreatic cancer: metastatic to liver  HTN (hypertension)  Afib  History of coronary artery stent placement  Carpal tunnel syndrome  S/P abdominal paracentesis: aug 30, 2018  H/O knee surgery      FAMILY HISTORY:  Family history of liver cancer (Mother): In mother,  in her 50&#x27;s      SOCIAL HISTORY:    [x ] Non-smoker  [ ] Smoker  [ ] Alcohol      REVIEW OF SYSTEMS:  CONSTITUTIONAL: +fever, weight loss, +fatigue  EYES: No eye pain, visual disturbances, or discharge  ENMT:  No difficulty hearing, tinnitus, vertigo; No sinus or throat pain  NECK: No pain or stiffness  RESPIRATORY: No cough, wheezing, chills or hemoptysis; No Shortness of Breath  CARDIOVASCULAR: No chest pain, palpitations, passing out, dizziness, or leg swelling  GASTROINTESTINAL: No abdominal or epigastric pain. No nausea, vomiting, or hematemesis; No diarrhea or constipation. No melena or hematochezia.  GENITOURINARY: No dysuria, frequency, hematuria, or incontinence  NEUROLOGICAL: No headaches, memory loss, loss of strength, numbness, or tremors  SKIN: No itching, burning, rashes, or lesions   LYMPH Nodes: No enlarged glands  ENDOCRINE: No heat or cold intolerance; No hair loss  MUSCULOSKELETAL: No joint pain or swelling; No muscle, back, or extremity pain  PSYCHIATRIC: No depression, anxiety, mood swings, or difficulty sleeping  HEME/LYMPH: No easy bruising, or bleeding gums  ALLERY AND IMMUNOLOGIC: No hives or eczema	    [ ] All others negative	  [ ] Unable to obtain    PHYSICAL EXAM:  I&O's Summary    Vital Signs Last 24 Hrs  T(C): 36.6 (04 Sep 2018 11:12), Max: 37.2 (03 Sep 2018 21:02)  T(F): 97.8 (04 Sep 2018 11:12), Max: 99 (03 Sep 2018 21:02)  HR: 109 (04 Sep 2018 11:12) (109 - 123)  BP: 99/62 (04 Sep 2018 11:12) (93/60 - 106/60)  BP(mean): --  RR: 17 (04 Sep 2018 11:12) (17 - 18)  SpO2: 94% (04 Sep 2018 11:12) (93% - 95%)    Appearance: Normal	  HEENT:   Normal oral mucosa, PERRL, EOMI	  Lymphatic: No lymphadenopathy  Cardiovascular: irreg, tachycardic,, No JVD, No murmurs, No edema  Respiratory: Lungs clear to auscultation	  Psychiatry: A & O x 3, Mood & affect appropriate  Gastrointestinal:   Skin: No rashes, No ecchymoses, No cyanosis	  Neurologic: Non-focal  Extremities: Normal range of motion, No clubbing, cyanosis or edema  Vascular: Peripheral pulses palpable 2+ bilaterally    MEDICATIONS:  Home Medications:  carvedilol 6.25 mg oral tablet: 1 tab(s) orally 2 times a day (31 Aug 2018 23:09)  Creon 12,000 units oral delayed release capsule: 1-2 cap(s) orally 3 times a day before meals (31 Aug 2018 23:09)  Reglan 5 mg oral tablet: 1 tab(s) orally 4 times a day (before meals and at bedtime), As Needed (31 Aug 2018 23:09)  Xarelto 20 mg oral tablet: 1 tab(s) orally once a day (31 Aug 2018 23:09)    MEDICATIONS  (STANDING):  amylase/lipase/protease  (CREON 12,000 Units) 1 Capsule(s) Oral three times a day  carvedilol 6.25 milliGRAM(s) Oral every 12 hours  docusate sodium 100 milliGRAM(s) Oral two times a day  lactobacillus acidophilus 1 Tablet(s) Oral three times a day with meals  pantoprazole    Tablet 40 milliGRAM(s) Oral before breakfast  rivaroxaban 20 milliGRAM(s) Oral every 24 hours  senna 2 Tablet(s) Oral at bedtime  sodium chloride 0.9%. 1000 milliLiter(s) (75 mL/Hr) IV Continuous <Continuous>    MEDICATIONS  (PRN):  acetaminophen   Tablet 650 milliGRAM(s) Oral every 6 hours PRN For Temp greater than 38 C (100.4 F)  metoclopramide Injectable 10 milliGRAM(s) IV Push every 8 hours PRN nausea and vomiting    LABS:	 	Personally Labs Personally Reviewed 0- 9/3/2018                        9.7    5.42  )-----------( 192      ( 04 Sep 2018 07:54 )             28.8   09-    137  |  107  |  12  ----------------------------<  120<H>  3.4<L>   |  21<L>  |  0.69    Ca    7.6<L>      04 Sep 2018 06:42  Phos  2.9     09-03  Mg     1.8     09-03        proBNP:   Lipid Profile:   HgA1c:   TSH:     TROPONIN:        TELEMETRY: Afib 150s trending down to 110	    ECG:  	Afib RVR  RADIOLOGY:  OTHER: 	    CARDIAC TESTING/STUDIES:    [ ] Echocardiogram:  [ ]  Catheterization:  [ ] Stress Test:  	  	  ASSESSMENT/PLAN: 	  ROYCE NORMAN is a 73y Male patient PMH metastatic pancreatic adenocarcinoma s/p biliary stent (mets to liver, started Gemzar/abraxane 18), afib on xarelto, HTN presents with fever, generalized weakness, and poor appetite for one day .    upon presentation he had fever and Afib RVR.  As his fever has improved, AS per IR the fever is secondary to chem.  his HR continues to be elevated, his home dose coreg was restarted, however he had beer HR control on metoprolol.  He had prior cold hands and feet with metoprolol but his BP is soft and HR not controlled on coreg, recommend metoprolol 50mg BID and dc coreg.   No further inpatient cardiac workup or monitoring  please arrange outpatient followup with his cardiologist Dr Miles for further HR control  ok to hold xaralto as needed for future procedures    final cardiology recommendations above please call with any questions        Law Diamond MD, PhD  Cardiology Attending  [facility]  [phone]    (Cardiology Nocturnist cell number available 7 pm - 7 am every night; available daytime week days for follow-up only; daytime weekends covered by general cardiology consult service)
Patient is a 73y old  Male who presents with a chief complaint of fever (31 Aug 2018 22:36)      SUBJECTIVE / OVERNIGHT EVENTS: had Fever 101 last night. Today feels tired. Family at bedside.   Review of Systems  chest pain no  palpitations no  sob no  nausea no  headache no    MEDICATIONS  (STANDING):  amylase/lipase/protease  (CREON 12,000 Units) 1 Capsule(s) Oral three times a day  docusate sodium 100 milliGRAM(s) Oral two times a day  lactobacillus acidophilus 1 Tablet(s) Oral three times a day with meals  metoprolol tartrate 25 milliGRAM(s) Oral two times a day  pantoprazole    Tablet 40 milliGRAM(s) Oral before breakfast  piperacillin/tazobactam IVPB. 3.375 Gram(s) IV Intermittent every 8 hours  rivaroxaban 20 milliGRAM(s) Oral every 24 hours  senna 2 Tablet(s) Oral at bedtime  sodium chloride 0.9%. 1000 milliLiter(s) (75 mL/Hr) IV Continuous <Continuous>    MEDICATIONS  (PRN):  acetaminophen   Tablet 650 milliGRAM(s) Oral every 6 hours PRN For Temp greater than 38 C (100.4 F)  metoclopramide Injectable 10 milliGRAM(s) IV Push every 8 hours PRN nausea and vomiting      Vital Signs Last 24 Hrs  T(C): 36.7 (02 Sep 2018 11:31), Max: 38.3 (01 Sep 2018 21:58)  T(F): 98.1 (02 Sep 2018 11:31), Max: 101 (01 Sep 2018 21:58)  HR: 102 (02 Sep 2018 11:31) (102 - 154)  BP: 101/65 (02 Sep 2018 11:31) (89/58 - 114/63)  BP(mean): --  RR: 17 (02 Sep 2018 11:31) (16 - 18)  SpO2: 95% (02 Sep 2018 11:31) (95% - 95%)    PHYSICAL EXAM:  GENERAL: NAD, well-developed  HEAD:  Atraumatic, Normocephalic  EYES: EOMI, PERRLA, conjunctiva and sclera clear  NECK: Supple, No JVD  CHEST/LUNG: Clear to auscultation bilaterally; No wheeze  HEART: Regular rate and rhythm; No murmurs, rubs, or gallops  ABDOMEN: Soft, Nontender, Nondistended; Bowel sounds present  EXTREMITIES:  2+ Peripheral Pulses, No clubbing, cyanosis, or edema  PSYCH: AAOx3  NEUROLOGY: non-focal  SKIN: No rashes or lesions    LABS:                        9.6    7.63  )-----------( 217      ( 02 Sep 2018 08:03 )             27.9     09-02    138  |  107  |  11  ----------------------------<  130<H>  3.8   |  21<L>  |  0.71    Ca    7.6<L>      02 Sep 2018 06:44  Phos  2.7       Mg     1.7         TPro  5.4<L>  /  Alb  2.3<L>  /  TBili  3.8<H>  /  DBili  x   /  AST  36  /  ALT  23  /  AlkPhos  110      PT/INR - ( 01 Sep 2018 10:27 )   PT: 15.8 sec;   INR: 1.39 ratio         PTT - ( 01 Sep 2018 10:27 )  PTT:34.7 sec      Urinalysis Basic - ( 31 Aug 2018 20:52 )    Color: Brown / Appearance: SL Turbid / S.026 / pH: x  Gluc: x / Ketone: Negative  / Bili: Small / Urobili: >8 mg/dL   Blood: x / Protein: 100 mg/dL / Nitrite: Negative   Leuk Esterase: Negative / RBC: 5-10 /HPF / WBC 5-10 /HPF   Sq Epi: x / Non Sq Epi: x / Bacteria: Few /HPF        Culture - Blood (collected 31 Aug 2018 22:15)  Source: .Blood Blood-Peripheral  Preliminary Report (01 Sep 2018 23:01):    No growth to date.    Culture - Blood (collected 31 Aug 2018 22:15)  Source: .Blood Blood-Peripheral  Preliminary Report (01 Sep 2018 23:01):    No growth to date.    Culture - Urine (collected 31 Aug 2018 22:06)  Source: .Urine Clean Catch (Midstream)  Final Report (01 Sep 2018 22:11):    No growth        RADIOLOGY & ADDITIONAL TESTS:    Imaging Personally Reviewed:    Consultant(s) Notes Reviewed:      Care Discussed with Consultants/Other Providers:

## 2018-09-04 NOTE — PROGRESS NOTE ADULT - ASSESSMENT
Assessment:  · Assessment		  73M PMH recently diagnosed metastatic pancreatic adenocarcinoma s/p biliary stent (mets to liver, started Gemzar/abraxane 8/29/18), afib on xarelto, HTN presents with fever, generalized weakness, and poor appetite for one day. Likely septic, however source unclear at this time. With near-hypotension and afib RVR, rate somewhat improved after IVF resuscitation.       Fever. --Immunocompromised patient with fever, elevated HR, low BP, signs consistent with sepsis.  --source of infection unclear at this time. No focal symptoms, recent paracentesis, started chemo two days ago.   --UCx and BCx result pending   --CXR with small L pleural effusion (possibly from hypoalbuminemia), R base atelectasis, but likely related to compressive atelectasis from ascites.    Continue zosyn.   --Tylenol for fever.   -- ID follow    Atrial fibrillation with RVR.  --HR initially elevated in 130s-140s, now more consistently in 120s. Will continue with IVF. Low dose metoprolol to prevent signifiant RVR, goal HR around 110. Hold coreg due to its more significant anti-hypertensive properties.   --continue with xarelto for anticoagulation.   Cardiology evaluation noted.    Pancreatic cancer. --recent diagnosis and stated chemo two days ago.   --Bilirubin and liver enzymes significantly improved from when he had obstructive symptoms s/p biliary stent placement. Currently denies abdominal pain, significant nausea or vomiting.   --oncology evaluation noted.   --house GI evaluation noted.    Malignant Ascites. --s/p 5.5L paracentesis     Hypotension improving     Need for prophylactic measure. --anticoagulated with xarelto.  d/w wife,son,patient QA  Aditya Frederick MD pager [phone]
72 yo M, Afib, recently dxed met pancreatic ca (Ct as reviewed, hepatic mets, peritoneal carcinomatosis, malignant ascites, thrombosis of portal vein), s/p first cycle of chemo, admitted post large volume paracentesis, with fever.    Tachycardia noted, Afib with RVR   WBC minimally elevated.  Bili on continued decline from peak of 25  No clues to source of infection  Bld Cxs negative thus far  Extensive underlying malignancy could easily explain all findings as well- tumor fever, thrombosis  Reviewed temps he is currently afebrile   No clear infection found, possible pt clinical presentation from SIRS related to known metastatic malignancy? he is followed by heme onc     Plan:  Bld cxs remain  negative will DC empiric Zosyn as planned   continue supportive care as per medicine, heme onc  D/w pt and family
72 yo M, Afib, recently dxed met pancreatic ca (Ct as reviewed, hepatic mets, peritoneal carcinomatosis, malignant ascites, thrombosis of portal vein), s/p first cycle of chemo, admitted post large volume paracentesis, with fever.    Tachycardia noted, Afib with RVR   WBC minimally elevated.  Bili on continued decline from peak of 25  No clues to source of infection  Bld Cxs negative thus far  Extensive underlying malignancy could easily explain all findings as well- tumor fever, thrombosis  Tx 101 past 24 hrs    Plan:  Follow on Zosyn for now  If Bld cxs negative, favor d/c Zosyn tomorrow  ?steroid use as per Onc  D/w pt and family
74 yo M, Afib, recently dxed met pancreatic ca (Ct as reviewed, hepatic mets, peritoneal carcinomatosis, malignant ascites, thrombosis of portal vein), s/p first cycle of chemo, admitted post large volume paracentesis, with fever.    Tachycardia noted, Afib with RVR   WBC minimally elevated.  Bili on continued decline from peak of 25  No clues to source of infection  Bld Cxs negative thus far  Extensive underlying malignancy could easily explain all findings as well- tumor fever, thrombosis  Reviewed temps he is currently afebrile   No clear infection found, possible pt clinical presentation from SIRS related to known metastatic malignancy? he is [facility]   [facility] no [facility] and his white cell count is in normal range   no infection found     Plan:  no ID objection for DC planning   DC planning as per medical service   D/w pt and family
Assessment:  · Assessment		  73M PMH recently diagnosed metastatic pancreatic adenocarcinoma s/p biliary stent (mets to liver, started Gemzar/abraxane 8/29/18), afib on xarelto, HTN presents with fever, generalized weakness, and poor appetite for one day. Likely septic, however source unclear at this time. With near-hypotension and afib RVR, rate somewhat improved after IVF resuscitation.       Fever. --Immunocompromised patient with fever, elevated HR, low BP, signs consistent with sepsis.  --source of infection unclear at this time. No focal symptoms, recent paracentesis, started chemo two days ago.   --UCx and BCx NTD.  --CXR with small L pleural effusion (possibly from hypoalbuminemia), R base atelectasis, but likely related to compressive atelectasis from ascites.    Discontinue zosyn. Observe.   --Tylenol for fever.   -- ID follow    Atrial fibrillation with RVR.  --HR initially elevated in 130s-140s, now more consistently in 120s. Will continue with IVF. Restart coreg. Continue Xarelto for anticoagulation.   Cardiology follow.    Pancreatic cancer. --recent diagnosis and stated chemo two days ago.   --Bilirubin and liver enzymes significantly improved from when he had obstructive symptoms s/p biliary stent placement. Currently denies abdominal pain, significant nausea or vomiting.   --oncology follow.   --house GI evaluation noted.    Malignant Ascites. --s/p 5.5L paracentesis Will need repeat paracentesis soon.     Hypotension improving     Need for prophylactic measure. --anticoagulated with xarelto.  d/w wife,son,patient QA  Aditya Frederick MD pager [phone]
Assessment:  · Assessment		  73M PMH recently diagnosed metastatic pancreatic adenocarcinoma s/p biliary stent (mets to liver, started Gemzar/abraxane 8/29/18), afib on xarelto, HTN presents with fever, generalized weakness, and poor appetite for one day. Likely septic, however source unclear at this time. With near-hypotension and afib RVR, rate somewhat improved after IVF resuscitation.       Fever. --Immunocompromised patient with fever, elevated HR, low BP, signs consistent with sepsis.  --source of infection unclear at this time. No focal symptoms, recent paracentesis, started chemo two days ago.   --UCx and BCx NTD.  --CXR with small L pleural effusion (possibly from hypoalbuminemia), R base atelectasis, but likely related to compressive atelectasis from ascites.    Discontinue zosyn. Observe.   --Tylenol for fever.   -- ID follow noted. Cleared for Discharge.    Atrial fibrillation with RVR.  --HR initially elevated in 130s-140s, now more consistently in 100. Rate control with Metoprolol. Discontinue coreg. Continue Xarelto for anticoagulation.   Cardiology follow.     Pancreatic cancer. --recent diagnosis and stated chemo two days ago.   --Bilirubin and liver enzymes significantly improved from when he had obstructive symptoms s/p biliary stent placement. Currently denies abdominal pain, significant nausea or vomiting.   --oncology follow.   --house GI evaluation noted.  ChemoRx as per Oncology.    Malignant Ascites. --s/p 5.5L paracentesis Will need repeat paracentesis soon.     Hypotension improving     Need for prophylactic measure. --anticoagulated with xarelto.  d/w wife, family, patient  QA    [place] home.  Aditya Frederick MD pager [phone]
Assessment:  · Assessment		  73M PMH recently diagnosed metastatic pancreatic adenocarcinoma s/p biliary stent (mets to liver, started Gemzar/abraxane 8/29/18), afib on xarelto, HTN presents with fever, generalized weakness, and poor appetite for one day. Likely septic, however source unclear at this time. With near-hypotension and afib RVR, rate somewhat improved after IVF resuscitation.       Fever. --Immunocompromised patient with fever, elevated HR, low BP, signs consistent with sepsis.  --source of infection unclear at this time. No focal symptoms, recent paracentesis, started chemo two days ago.   --UCx and BCx result pending   --CXR with small L pleural effusion (possibly from hypoalbuminemia), R base atelectasis, but likely related to compressive atelectasis from ascites.    --Started on vancomycin and zosyn, will continue broad spectrum antibiotics for now.   --Tylenol for fever.   --Lactate 2.2, s/p IVF resuscitation  --CT A/P pending    -- ID evaluation called    Atrial fibrillation with RVR.  --HR initially elevated in 130s-140s, now more consistently in 120s. Will continue with IVF. Low dose metoprolol to prevent signifiant RVR, goal HR around 110. Hold coreg due to its more significant anti-hypertensive properties.   --continue with xarelto for anticoagulation.     Pancreatic cancer. --recent diagnosis and stated chemo two days ago.   --Bilirubin and liver enzymes significantly improved from when he had obstructive symptoms s/p biliary stent placement. Currently denies abdominal pain, significant nausea or vomiting.   --oncology consult called.   --house GI consult called     Malignant Ascites. --s/p 5.5L paracentesis yesterday, did not receive albumin.  --If BP continues to be low after 3rd liter IVF will give albumin.      Hypotension. --If BP continues to be low  will give albumin.   --Patient with low-normal BP on last admission as well.     Need for prophylactic measure. --anticoagulated with xarelto.  Aditya Frederick MD pager [phone]
Scott Moses is a gentleman with Stage 4 pancreas cancer. He is seen with is wife and son in attendance. He had a difficult evening with fever. Antibiotic therapy continues and culture results are still pending. He is not neutropenic.  Wife has asked about appetite stimulation as he has decreased oral intake over several weeks. I explained that the burden of pancreas cancer and cachexia is a difficult task to improve and that low doses of steroids may be used as an outpatient source of fever elevation is not currently established but his abdomen is no tender and I do not think that the abdomen has infection. Tumor fever may be seen in the process of gemcitabine administration

## 2018-09-04 NOTE — DISCHARGE NOTE ADULT - CARE PLAN
Principal Discharge DX:	Ascites  Goal:	PT free from s/s of infection  Assessment and plan of treatment:	S/p paracentesis on going treatment  Secondary Diagnosis:	HTN (hypertension)  Assessment and plan of treatment:	Follow up with your medical doctor to establish long term blood pressure treatment goals.  Secondary Diagnosis:	Atrial fibrillation with RVR  Assessment and plan of treatment:	Atrial fibrillation is the most common heart rhythm problem & has the risk of stroke & heart attack  It helps if you control your blood pressure, not drink more than 1-2 alcohol drinks per day, cut down on   caffeine, getting treatment for over active thyroid gland, & getting exercise    Call your doctor if you feel your heart racing or beating unusually, chest tightness or pain, lightheaded, faint, shortness of breath especially with exercise  It is important to take your heart medication as prescribed

## 2018-09-05 ENCOUNTER — APPOINTMENT (OUTPATIENT)
Dept: INFUSION THERAPY | Facility: HOSPITAL | Age: 73
End: 2018-09-05

## 2018-09-05 ENCOUNTER — LABORATORY RESULT (OUTPATIENT)
Age: 73
End: 2018-09-05

## 2018-09-05 ENCOUNTER — OTHER (OUTPATIENT)
Age: 73
End: 2018-09-05

## 2018-09-05 LAB
CULTURE RESULTS: SIGNIFICANT CHANGE UP
CULTURE RESULTS: SIGNIFICANT CHANGE UP
SPECIMEN SOURCE: SIGNIFICANT CHANGE UP
SPECIMEN SOURCE: SIGNIFICANT CHANGE UP

## 2018-09-09 ENCOUNTER — FORM ENCOUNTER (OUTPATIENT)
Age: 73
End: 2018-09-09

## 2018-09-09 RX ORDER — LOPERAMIDE HYDROCHLORIDE 2 MG/1
2 TABLET ORAL EVERY 6 HOURS
Qty: 240 | Refills: 0 | Status: ACTIVE | COMMUNITY
Start: 2018-09-09 | End: 1900-01-01

## 2018-09-10 ENCOUNTER — OUTPATIENT (OUTPATIENT)
Dept: OUTPATIENT SERVICES | Facility: HOSPITAL | Age: 73
LOS: 1 days | End: 2018-09-10
Payer: MEDICARE

## 2018-09-10 ENCOUNTER — APPOINTMENT (OUTPATIENT)
Dept: ULTRASOUND IMAGING | Facility: IMAGING CENTER | Age: 73
End: 2018-09-10
Payer: MEDICARE

## 2018-09-10 DIAGNOSIS — G56.00 CARPAL TUNNEL SYNDROME, UNSPECIFIED UPPER LIMB: Chronic | ICD-10-CM

## 2018-09-10 DIAGNOSIS — C25.9 MALIGNANT NEOPLASM OF PANCREAS, UNSPECIFIED: ICD-10-CM

## 2018-09-10 DIAGNOSIS — Z98.890 OTHER SPECIFIED POSTPROCEDURAL STATES: Chronic | ICD-10-CM

## 2018-09-10 DIAGNOSIS — R18.8 OTHER ASCITES: ICD-10-CM

## 2018-09-10 DIAGNOSIS — Z95.5 PRESENCE OF CORONARY ANGIOPLASTY IMPLANT AND GRAFT: Chronic | ICD-10-CM

## 2018-09-10 PROCEDURE — 49083 ABD PARACENTESIS W/IMAGING: CPT

## 2018-09-11 ENCOUNTER — RESULT REVIEW (OUTPATIENT)
Age: 73
End: 2018-09-11

## 2018-09-11 ENCOUNTER — LABORATORY RESULT (OUTPATIENT)
Age: 73
End: 2018-09-11

## 2018-09-11 ENCOUNTER — APPOINTMENT (OUTPATIENT)
Dept: INFUSION THERAPY | Facility: HOSPITAL | Age: 73
End: 2018-09-11

## 2018-09-11 ENCOUNTER — INBOUND DOCUMENT (OUTPATIENT)
Age: 73
End: 2018-09-11

## 2018-09-11 PROBLEM — R19.7 DIARRHEA: Status: ACTIVE | Noted: 2018-09-09

## 2018-09-11 LAB
BUN SERPL-MCNC: 14 MG/DL — SIGNIFICANT CHANGE UP (ref 7–23)
CA-I BLDA-SCNC: 1.1 MMOL/L — LOW (ref 1.12–1.3)
CHLORIDE SERPL-SCNC: 99 MMOL/L — SIGNIFICANT CHANGE UP (ref 96–108)
CO2 SERPL-SCNC: 23 MMOL/L — SIGNIFICANT CHANGE UP (ref 22–31)
CREAT SERPL-MCNC: 0.8 MG/DL — SIGNIFICANT CHANGE UP (ref 0.5–1.3)
GLUCOSE SERPL-MCNC: 145 MG/DL — HIGH (ref 70–99)
HCT VFR BLD CALC: 31.1 % — LOW (ref 39–50)
HGB BLD-MCNC: 10.4 G/DL — LOW (ref 13–17)
LYMPHOCYTES # BLD AUTO: 16 % — SIGNIFICANT CHANGE UP (ref 13–44)
MCHC RBC-ENTMCNC: 33.3 PG — SIGNIFICANT CHANGE UP (ref 27–34)
MCHC RBC-ENTMCNC: 33.4 G/DL — SIGNIFICANT CHANGE UP (ref 32–36)
MCV RBC AUTO: 99.8 FL — SIGNIFICANT CHANGE UP (ref 80–100)
MONOCYTES NFR BLD AUTO: 8 % — SIGNIFICANT CHANGE UP (ref 2–14)
NEUTROPHILS # BLD AUTO: SIGNIFICANT CHANGE UP K/UL (ref 1.8–7.4)
NEUTROPHILS NFR BLD AUTO: 76 % — SIGNIFICANT CHANGE UP (ref 43–77)
NRBC # BLD: 1 /100 — HIGH (ref 0–0)
PLAT MORPH BLD: NORMAL — SIGNIFICANT CHANGE UP
PLATELET # BLD AUTO: 87 K/UL — LOW (ref 150–400)
POTASSIUM SERPL-MCNC: 3.7 MMOL/L — SIGNIFICANT CHANGE UP (ref 3.5–5.3)
POTASSIUM SERPL-SCNC: 3.7 MMOL/L — SIGNIFICANT CHANGE UP (ref 3.5–5.3)
RBC # BLD: 3.12 M/UL — LOW (ref 4.2–5.8)
RBC # FLD: 12.9 % — SIGNIFICANT CHANGE UP (ref 10.3–14.5)
RBC BLD AUTO: SIGNIFICANT CHANGE UP
SODIUM SERPL-SCNC: 137 MMOL/L — SIGNIFICANT CHANGE UP (ref 135–145)
WBC # BLD: 3.2 K/UL — LOW (ref 3.8–10.5)
WBC # FLD AUTO: 3.2 K/UL — LOW (ref 3.8–10.5)

## 2018-09-11 RX ORDER — DIPHENOXYLATE HYDROCHLORIDE AND ATROPINE SULFATE 2.5; .025 MG/1; MG/1
2.5-0.025 TABLET ORAL
Qty: 40 | Refills: 0 | Status: ACTIVE | COMMUNITY
Start: 2018-09-11 | End: 1900-01-01

## 2018-09-12 ENCOUNTER — APPOINTMENT (OUTPATIENT)
Dept: INFUSION THERAPY | Facility: HOSPITAL | Age: 73
End: 2018-09-12
Payer: MEDICARE

## 2018-09-12 DIAGNOSIS — R19.7 DIARRHEA, UNSPECIFIED: ICD-10-CM

## 2018-09-12 DIAGNOSIS — E86.0 DEHYDRATION: ICD-10-CM

## 2018-09-13 ENCOUNTER — OUTPATIENT (OUTPATIENT)
Dept: OUTPATIENT SERVICES | Facility: HOSPITAL | Age: 73
LOS: 1 days | Discharge: HOME | End: 2018-09-13

## 2018-09-13 ENCOUNTER — OTHER (OUTPATIENT)
Age: 73
End: 2018-09-13

## 2018-09-13 DIAGNOSIS — Z98.890 OTHER SPECIFIED POSTPROCEDURAL STATES: Chronic | ICD-10-CM

## 2018-09-13 DIAGNOSIS — Z95.5 PRESENCE OF CORONARY ANGIOPLASTY IMPLANT AND GRAFT: Chronic | ICD-10-CM

## 2018-09-13 DIAGNOSIS — C25.9 MALIGNANT NEOPLASM OF PANCREAS, UNSPECIFIED: ICD-10-CM

## 2018-09-13 DIAGNOSIS — G56.00 CARPAL TUNNEL SYNDROME, UNSPECIFIED UPPER LIMB: Chronic | ICD-10-CM

## 2018-09-14 ENCOUNTER — APPOINTMENT (OUTPATIENT)
Dept: INFUSION THERAPY | Facility: HOSPITAL | Age: 73
End: 2018-09-14

## 2018-09-17 ENCOUNTER — APPOINTMENT (OUTPATIENT)
Dept: ENDOVASCULAR SURGERY | Facility: CLINIC | Age: 73
End: 2018-09-17
Payer: MEDICARE

## 2018-09-17 DIAGNOSIS — E86.0 DEHYDRATION: ICD-10-CM

## 2018-09-17 PROCEDURE — 76937 US GUIDE VASCULAR ACCESS: CPT | Mod: PD

## 2018-09-17 PROCEDURE — 36561 INSERT TUNNELED CV CATH: CPT | Mod: PD

## 2018-09-17 PROCEDURE — 77001 FLUOROGUIDE FOR VEIN DEVICE: CPT | Mod: PD

## 2018-09-18 ENCOUNTER — FORM ENCOUNTER (OUTPATIENT)
Age: 73
End: 2018-09-18

## 2018-09-19 ENCOUNTER — APPOINTMENT (OUTPATIENT)
Dept: ULTRASOUND IMAGING | Facility: IMAGING CENTER | Age: 73
End: 2018-09-19
Payer: MEDICARE

## 2018-09-19 ENCOUNTER — APPOINTMENT (OUTPATIENT)
Dept: INFUSION THERAPY | Facility: HOSPITAL | Age: 73
End: 2018-09-19
Payer: MEDICARE

## 2018-09-19 ENCOUNTER — RESULT REVIEW (OUTPATIENT)
Age: 73
End: 2018-09-19

## 2018-09-19 ENCOUNTER — OUTPATIENT (OUTPATIENT)
Dept: OUTPATIENT SERVICES | Facility: HOSPITAL | Age: 73
LOS: 1 days | End: 2018-09-19

## 2018-09-19 ENCOUNTER — INPATIENT (INPATIENT)
Facility: HOSPITAL | Age: 73
LOS: 2 days | Discharge: HOME | DRG: 314 | End: 2018-09-22
Attending: HOSPITALIST | Admitting: HOSPITALIST
Payer: MEDICARE

## 2018-09-19 VITALS
RESPIRATION RATE: 18 BRPM | OXYGEN SATURATION: 97 % | SYSTOLIC BLOOD PRESSURE: 110 MMHG | HEART RATE: 107 BPM | DIASTOLIC BLOOD PRESSURE: 69 MMHG

## 2018-09-19 DIAGNOSIS — R06.02 SHORTNESS OF BREATH: ICD-10-CM

## 2018-09-19 DIAGNOSIS — Z95.5 PRESENCE OF CORONARY ANGIOPLASTY IMPLANT AND GRAFT: Chronic | ICD-10-CM

## 2018-09-19 DIAGNOSIS — G56.00 CARPAL TUNNEL SYNDROME, UNSPECIFIED UPPER LIMB: Chronic | ICD-10-CM

## 2018-09-19 DIAGNOSIS — Z98.890 OTHER SPECIFIED POSTPROCEDURAL STATES: Chronic | ICD-10-CM

## 2018-09-19 DIAGNOSIS — C25.9 MALIGNANT NEOPLASM OF PANCREAS, UNSPECIFIED: ICD-10-CM

## 2018-09-19 DIAGNOSIS — R18.8 OTHER ASCITES: ICD-10-CM

## 2018-09-19 LAB
ALBUMIN SERPL ELPH-MCNC: 2.3 G/DL — LOW (ref 3.3–5)
ALP SERPL-CCNC: 376 U/L — HIGH (ref 40–120)
ALT FLD-CCNC: 19 U/L — SIGNIFICANT CHANGE UP (ref 10–45)
ANION GAP SERPL CALC-SCNC: 14 MMOL/L — SIGNIFICANT CHANGE UP (ref 5–17)
ANISOCYTOSIS BLD QL: SLIGHT — SIGNIFICANT CHANGE UP
APTT BLD: 32.2 SEC — SIGNIFICANT CHANGE UP (ref 27.5–37.4)
AST SERPL-CCNC: 38 U/L — SIGNIFICANT CHANGE UP (ref 10–40)
BASE EXCESS BLDV CALC-SCNC: 1.8 MMOL/L — SIGNIFICANT CHANGE UP (ref -2–2)
BASOPHILS # BLD AUTO: 0 K/UL — SIGNIFICANT CHANGE UP (ref 0–0.2)
BASOPHILS # BLD AUTO: 0 K/UL — SIGNIFICANT CHANGE UP (ref 0–0.2)
BILIRUB SERPL-MCNC: 1.7 MG/DL — HIGH (ref 0.2–1.2)
BUN SERPL-MCNC: 14 MG/DL — SIGNIFICANT CHANGE UP (ref 7–23)
CA-I SERPL-SCNC: 1.1 MMOL/L — LOW (ref 1.12–1.3)
CALCIUM SERPL-MCNC: 7.8 MG/DL — LOW (ref 8.4–10.5)
CHLORIDE BLDV-SCNC: 101 MMOL/L — SIGNIFICANT CHANGE UP (ref 96–108)
CHLORIDE SERPL-SCNC: 101 MMOL/L — SIGNIFICANT CHANGE UP (ref 96–108)
CO2 BLDV-SCNC: 27 MMOL/L — SIGNIFICANT CHANGE UP (ref 22–30)
CO2 SERPL-SCNC: 22 MMOL/L — SIGNIFICANT CHANGE UP (ref 22–31)
CREAT SERPL-MCNC: 1.16 MG/DL — SIGNIFICANT CHANGE UP (ref 0.5–1.3)
EOSINOPHIL # BLD AUTO: 0 K/UL — SIGNIFICANT CHANGE UP (ref 0–0.5)
EOSINOPHIL # BLD AUTO: 0.5 K/UL — SIGNIFICANT CHANGE UP (ref 0–0.5)
EOSINOPHIL NFR BLD AUTO: 1 % — SIGNIFICANT CHANGE UP (ref 0–6)
GAS PNL BLDV: 133 MMOL/L — LOW (ref 136–145)
GAS PNL BLDV: SIGNIFICANT CHANGE UP
GLUCOSE BLDV-MCNC: 114 MG/DL — HIGH (ref 70–99)
GLUCOSE SERPL-MCNC: 117 MG/DL — HIGH (ref 70–99)
HCO3 BLDV-SCNC: 26 MMOL/L — SIGNIFICANT CHANGE UP (ref 21–29)
HCT VFR BLD CALC: 31.8 % — LOW (ref 39–50)
HCT VFR BLD CALC: 34.5 % — LOW (ref 39–50)
HCT VFR BLDA CALC: 33 % — LOW (ref 39–50)
HGB BLD CALC-MCNC: 10.6 G/DL — LOW (ref 13–17)
HGB BLD-MCNC: 10.8 G/DL — LOW (ref 13–17)
HGB BLD-MCNC: 11.6 G/DL — LOW (ref 13–17)
HYPOCHROMIA BLD QL: SLIGHT — SIGNIFICANT CHANGE UP
INR BLD: 1.31 RATIO — HIGH (ref 0.88–1.16)
LACTATE BLDV-MCNC: 1.5 MMOL/L — SIGNIFICANT CHANGE UP (ref 0.7–2)
LG PLATELETS BLD QL AUTO: SLIGHT — SIGNIFICANT CHANGE UP
LYMPHOCYTES # BLD AUTO: 1.2 K/UL — SIGNIFICANT CHANGE UP (ref 1–3.3)
LYMPHOCYTES # BLD AUTO: 1.3 K/UL — SIGNIFICANT CHANGE UP (ref 1–3.3)
LYMPHOCYTES # BLD AUTO: 4 % — LOW (ref 13–44)
LYMPHOCYTES # BLD AUTO: 8 % — LOW (ref 13–44)
MACROCYTES BLD QL: SLIGHT — SIGNIFICANT CHANGE UP
MCHC RBC-ENTMCNC: 33.1 PG — SIGNIFICANT CHANGE UP (ref 27–34)
MCHC RBC-ENTMCNC: 33.5 G/DL — SIGNIFICANT CHANGE UP (ref 32–36)
MCHC RBC-ENTMCNC: 33.8 PG — SIGNIFICANT CHANGE UP (ref 27–34)
MCHC RBC-ENTMCNC: 34 GM/DL — SIGNIFICANT CHANGE UP (ref 32–36)
MCV RBC AUTO: 98.7 FL — SIGNIFICANT CHANGE UP (ref 80–100)
MCV RBC AUTO: 99.6 FL — SIGNIFICANT CHANGE UP (ref 80–100)
METAMYELOCYTES # FLD: 3 % — HIGH (ref 0–0)
METAMYELOCYTES # FLD: 3 % — HIGH (ref 0–0)
MONOCYTES # BLD AUTO: 1.6 K/UL — HIGH (ref 0–0.9)
MONOCYTES # BLD AUTO: 2.1 K/UL — HIGH (ref 0–0.9)
MONOCYTES NFR BLD AUTO: 13 % — SIGNIFICANT CHANGE UP (ref 2–14)
MONOCYTES NFR BLD AUTO: 7 % — SIGNIFICANT CHANGE UP (ref 2–14)
MYELOCYTES NFR BLD: 1 % — HIGH (ref 0–0)
NEUTROPHILS # BLD AUTO: 10.8 K/UL — HIGH (ref 1.8–7.4)
NEUTROPHILS # BLD AUTO: 13.7 K/UL — HIGH (ref 1.8–7.4)
NEUTROPHILS NFR BLD AUTO: 77 % — SIGNIFICANT CHANGE UP (ref 43–77)
NEUTROPHILS NFR BLD AUTO: 78 % — HIGH (ref 43–77)
NEUTS BAND # BLD: 1 % — SIGNIFICANT CHANGE UP (ref 0–8)
NEUTS BAND # BLD: 4 % — SIGNIFICANT CHANGE UP (ref 0–8)
NT-PROBNP SERPL-SCNC: 1690 PG/ML — HIGH (ref 0–300)
PCO2 BLDV: 41 MMHG — SIGNIFICANT CHANGE UP (ref 35–50)
PH BLDV: 7.42 — SIGNIFICANT CHANGE UP (ref 7.35–7.45)
PLAT MORPH BLD: NORMAL — SIGNIFICANT CHANGE UP
PLAT MORPH BLD: NORMAL — SIGNIFICANT CHANGE UP
PLATELET # BLD AUTO: 250 K/UL — SIGNIFICANT CHANGE UP (ref 150–400)
PLATELET # BLD AUTO: 305 K/UL — SIGNIFICANT CHANGE UP (ref 150–400)
PO2 BLDV: 21 MMHG — LOW (ref 25–45)
POLYCHROMASIA BLD QL SMEAR: SLIGHT — SIGNIFICANT CHANGE UP
POTASSIUM BLDV-SCNC: 3.1 MMOL/L — LOW (ref 3.5–5.3)
POTASSIUM SERPL-MCNC: 3.2 MMOL/L — LOW (ref 3.5–5.3)
POTASSIUM SERPL-SCNC: 3.2 MMOL/L — LOW (ref 3.5–5.3)
PROT SERPL-MCNC: 5.3 G/DL — LOW (ref 6–8.3)
PROTHROM AB SERPL-ACNC: 14.2 SEC — HIGH (ref 9.8–12.7)
RBC # BLD: 3.2 M/UL — LOW (ref 4.2–5.8)
RBC # BLD: 3.49 M/UL — LOW (ref 4.2–5.8)
RBC # FLD: 14.8 % — HIGH (ref 10.3–14.5)
RBC # FLD: 14.9 % — HIGH (ref 10.3–14.5)
RBC BLD AUTO: ABNORMAL
RBC BLD AUTO: SIGNIFICANT CHANGE UP
SAO2 % BLDV: 26 % — LOW (ref 67–88)
SODIUM SERPL-SCNC: 137 MMOL/L — SIGNIFICANT CHANGE UP (ref 135–145)
TROPONIN T, HIGH SENSITIVITY RESULT: 19 NG/L — SIGNIFICANT CHANGE UP (ref 0–51)
WBC # BLD: 14 K/UL — HIGH (ref 3.8–10.5)
WBC # BLD: 17.2 K/UL — HIGH (ref 3.8–10.5)
WBC # FLD AUTO: 14 K/UL — HIGH (ref 3.8–10.5)
WBC # FLD AUTO: 17.2 K/UL — HIGH (ref 3.8–10.5)

## 2018-09-19 PROCEDURE — 99285 EMERGENCY DEPT VISIT HI MDM: CPT | Mod: 25

## 2018-09-19 PROCEDURE — 71045 X-RAY EXAM CHEST 1 VIEW: CPT | Mod: 26

## 2018-09-19 PROCEDURE — 93010 ELECTROCARDIOGRAM REPORT: CPT

## 2018-09-19 PROCEDURE — 99213 OFFICE O/P EST LOW 20 MIN: CPT

## 2018-09-19 PROCEDURE — 49083 ABD PARACENTESIS W/IMAGING: CPT

## 2018-09-19 RX ORDER — SODIUM CHLORIDE 9 MG/ML
1000 INJECTION INTRAMUSCULAR; INTRAVENOUS; SUBCUTANEOUS ONCE
Qty: 0 | Refills: 0 | Status: COMPLETED | OUTPATIENT
Start: 2018-09-19 | End: 2018-09-19

## 2018-09-19 RX ORDER — POTASSIUM CHLORIDE 20 MEQ
40 PACKET (EA) ORAL ONCE
Qty: 0 | Refills: 0 | Status: COMPLETED | OUTPATIENT
Start: 2018-09-19 | End: 2018-09-19

## 2018-09-19 RX ORDER — ALBUMIN HUMAN 25 %
100 VIAL (ML) INTRAVENOUS ONCE
Qty: 0 | Refills: 0 | Status: COMPLETED | OUTPATIENT
Start: 2018-09-19 | End: 2018-09-19

## 2018-09-19 RX ADMIN — Medication 50 MILLILITER(S): at 23:04

## 2018-09-19 RX ADMIN — Medication 40 MILLIEQUIVALENT(S): at 23:03

## 2018-09-19 RX ADMIN — SODIUM CHLORIDE 200 MILLILITER(S): 9 INJECTION INTRAMUSCULAR; INTRAVENOUS; SUBCUTANEOUS at 19:34

## 2018-09-19 NOTE — ED ADULT NURSE NOTE - OBJECTIVE STATEMENT
73 y.o M arrived via EMS from outpatient cancer center c/o lightheadedness/weakness. A&Ox3. pmh a-fib on Xarelto, pancreatic CA (currently on chemo most recently round 1 week ago). Per EMS, pt was having abdominal paracentesis performed - 1000mL fluid removed when he developed rapid a-fib and hypotension. pt then sent to ED. PTA to ED pt received 50mg albumin and about 100NS. EMS placed #18G IV to L FA, removed in ED r/t not flushing/no blood return. pt states 2 days ago he had port placed to R chest, has not yet been used or accessed (not accessed in ED). pt stopped taking Xarelto about 8 days prior to port placement procedure per his MD instruction. currently endorses abdominal discomfort - dressing noted to R abdomen (site of paracentesis). per EMS pt O2 sat 93-94% RA, placed on NC improvement to O2 sat maintaining above 95%. pt appears pale, respirations     He states he feels abdominal discomfort and sob both unchanged from prior to oncology visit. He denies chest pain, headaches, nausea, vomiting, urinary symptoms, paresthesias      pale 73 y.o M arrived via EMS from outpatient cancer center c/o lightheadedness/weakness. A&Ox3. pmh a-fib on Xarelto, pancreatic CA (currently on chemo most recently round 1 week ago). Per EMS, pt was having abdominal paracentesis performed - 1000mL fluid removed when he developed rapid a-fib and hypotension. pt then sent to ED. PTA to ED pt received 50mg albumin and about 100NS. EMS placed #18G IV to L FA, removed in ED r/t not flushing/no blood return. pt states 2 days ago he had port placed to R chest, has not yet been used or accessed (not accessed in ED). pt stopped taking Xarelto about 8 days prior to port placement procedure per his MD instruction. currently endorses abdominal discomfort - dressing noted to R abdomen (site of paracentesis). per EMS pt O2 sat 93-94% RA, placed on NC improvement to O2 sat maintaining above 95%. neuro intact, able to move all extremities. pt placed on cardiac monitoring, EKG performed. pt states he has been feeling SOB but NC O2 is helping. Denies CP, N/V/D, fevers, chills, HA, dizziness, pain/burning upon urination, back pain. Safety and comfort provided/maintained. Spouse at bedside.

## 2018-09-19 NOTE — ED PROVIDER NOTE - OBJECTIVE STATEMENT
73 year old male w/ pmhx of afib on xeralto (stopped 9 days ago for port placement procedure), Pancreatic cancer diagnosed 8/2018 last chemo 9/5 presents from oncology office for lightheadedness and weakness. Patient was at office for therapeutic paracentesis  when during the procedure patient went into rapid afib and became hypotensive. About 1000 cc was removed prior to onset symptoms. Patient given 50 of albumin and 150cc bolus prior to transport to ED. He states he feels abdominal discomfort and sob both unchanged from prior to oncology visit. 73 year old male w/ pmhx of afib on xeralto (stopped 9 days ago for port placement procedure), Pancreatic cancer diagnosed 8/2018 last chemo 9/5 presents from oncology office for lightheadedness and weakness. Patient was at office for therapeutic paracentesis  when during the procedure patient went into rapid afib and became hypotensive. About 1000 cc was removed prior to onset symptoms. Patient given 50 of albumin and 150cc bolus prior to transport to ED. He states he feels abdominal discomfort and sob both unchanged from prior to oncology visit. He denies chest pain, headaches, nausea, vomiting, urinary symptoms, paresthesias

## 2018-09-19 NOTE — ED PROVIDER NOTE - PROGRESS NOTE DETAILS
WBC count appreciated. Afebrile rectally. Abdominal discomfort on palpation but I do not believe his exam is c/w peritonitis at this time. He also remains tachycardic but is due for his PM medications which include metoprolol and carvedilol. Should his BP allow for the administration of these meds, we will provide. He is receiving IVF and I would not otherwise provide any additional AVN blockade.

## 2018-09-19 NOTE — ED PROVIDER NOTE - PHYSICAL EXAMINATION
CONSTITUTIONAL: Patient is awake, alert and oriented x 3. Patient is ill appearing and in no acute distress.  HEAD: NCAT,   EYES: PERRL b/l, EOMI,   ENT: Airway patent, Nasal mucosa clear. Mouth with normal mucosa. Throat has no vesicles, no oropharyngeal exudates and uvula is midline.  NECK: supple,   LUNGS: Bibasilar crackles ,  HEART: Tachycardic Irregular rhythm .+S1S2 no murmurs,   ABDOMEN: Soft, distended w/ + ascites, nttp, +bs no rebound or guarding.   EXTREMITY: 2+ pitting edema to the lower ext b/l, FROM upper and lower ext b/l  SKIN: mild jaundice  NEURO: No focal deficits

## 2018-09-19 NOTE — ED ADULT NURSE NOTE - NSIMPLEMENTINTERV_GEN_ALL_ED
Implemented All Fall with Harm Risk Interventions:  [place] to call system. Call bell, personal items and telephone within reach. Instruct patient to call for assistance. Room bathroom lighting operational. Non-slip footwear when patient is off stretcher. Physically safe environment: no spills, clutter or unnecessary equipment. Stretcher in lowest position, wheels locked, appropriate side rails in place. Provide visual cue, wrist band, yellow gown, etc. Monitor gait and stability. Monitor for mental status changes and reorient to person, place, and time. Review medications for side effects contributing to fall risk. Reinforce activity limits and safety measures with patient and family. Provide visual clues: red socks.

## 2018-09-19 NOTE — ED ADULT TRIAGE NOTE - CHIEF COMPLAINT QUOTE
Patient sent from oncologist office, during paracentesis episode of lightheadedness/ weakness and lethargy associated with "elevated HR and low BP". Patient given 50mg albumin and 100mL NS at facility.

## 2018-09-19 NOTE — ED ADULT NURSE REASSESSMENT NOTE - NS ED NURSE REASSESS COMMENT FT1
Pt repositioned, pt family at the bedside. Pending transport. MD Randolph approved off tele to floor. Receiving RN aware of HR.

## 2018-09-19 NOTE — ED PROVIDER NOTE - ATTENDING CONTRIBUTION TO CARE
72 y/o male with the above documented history and HPI who on exam appears comfortable. VSs noted, neck supple, lungs c/ diminished BSs @ bases, cardiac sounds s/ audible m/r/g, abdomen soft but distended c/ diffuse discomfort s/ focality, rebound or guarding, extremities s/ asymmetry, skin s/ rash c/ 3+ B/L LE pitting edema and neurologically intact. Reason for referral likely related to his underlying condition and procedure as opposed to any acute primary cardiac issue. Screening EKG, labs and CXR ordered. IVFs running. Will follow his studies, reassess and treat/dispo accordingly. Anticipate admission, if not based on any diagnostic test results, at the very least in light of the small volume that was obtained via paracentesis and his still feeling a bit SOB.

## 2018-09-20 DIAGNOSIS — C25.9 MALIGNANT NEOPLASM OF PANCREAS, UNSPECIFIED: ICD-10-CM

## 2018-09-20 DIAGNOSIS — I95.9 HYPOTENSION, UNSPECIFIED: ICD-10-CM

## 2018-09-20 DIAGNOSIS — I48.91 UNSPECIFIED ATRIAL FIBRILLATION: ICD-10-CM

## 2018-09-20 DIAGNOSIS — R18.0 MALIGNANT ASCITES: ICD-10-CM

## 2018-09-20 DIAGNOSIS — R65.10 SYSTEMIC INFLAMMATORY RESPONSE SYNDROME (SIRS) OF NON-INFECTIOUS ORIGIN WITHOUT ACUTE ORGAN DYSFUNCTION: ICD-10-CM

## 2018-09-20 DIAGNOSIS — I81 PORTAL VEIN THROMBOSIS: ICD-10-CM

## 2018-09-20 DIAGNOSIS — Z29.9 ENCOUNTER FOR PROPHYLACTIC MEASURES, UNSPECIFIED: ICD-10-CM

## 2018-09-20 DIAGNOSIS — N17.9 ACUTE KIDNEY FAILURE, UNSPECIFIED: ICD-10-CM

## 2018-09-20 LAB
ALBUMIN SERPL ELPH-MCNC: 2.4 G/DL — LOW (ref 3.3–5)
ALP SERPL-CCNC: 291 U/L — HIGH (ref 40–120)
ALT FLD-CCNC: 15 U/L — SIGNIFICANT CHANGE UP (ref 10–45)
ANION GAP SERPL CALC-SCNC: 10 MMOL/L — SIGNIFICANT CHANGE UP (ref 5–17)
APPEARANCE UR: CLEAR — SIGNIFICANT CHANGE UP
AST SERPL-CCNC: 36 U/L — SIGNIFICANT CHANGE UP (ref 10–40)
BACTERIA # UR AUTO: NEGATIVE — SIGNIFICANT CHANGE UP
BILIRUB SERPL-MCNC: 1.6 MG/DL — HIGH (ref 0.2–1.2)
BILIRUB UR-MCNC: NEGATIVE — SIGNIFICANT CHANGE UP
BUN SERPL-MCNC: 11 MG/DL — SIGNIFICANT CHANGE UP (ref 7–23)
CALCIUM SERPL-MCNC: 7.9 MG/DL — LOW (ref 8.4–10.5)
CHLORIDE SERPL-SCNC: 102 MMOL/L — SIGNIFICANT CHANGE UP (ref 96–108)
CO2 SERPL-SCNC: 23 MMOL/L — SIGNIFICANT CHANGE UP (ref 22–31)
COLOR SPEC: YELLOW — SIGNIFICANT CHANGE UP
CREAT SERPL-MCNC: 1.05 MG/DL — SIGNIFICANT CHANGE UP (ref 0.5–1.3)
DIFF PNL FLD: ABNORMAL
EPI CELLS # UR: 0 /HPF — SIGNIFICANT CHANGE UP
GLUCOSE SERPL-MCNC: 108 MG/DL — HIGH (ref 70–99)
GLUCOSE UR QL: NEGATIVE — SIGNIFICANT CHANGE UP
HCT VFR BLD CALC: 29 % — LOW (ref 39–50)
HGB BLD-MCNC: 9.7 G/DL — LOW (ref 13–17)
HYALINE CASTS # UR AUTO: 4 /LPF — HIGH (ref 0–2)
KETONES UR-MCNC: NEGATIVE — SIGNIFICANT CHANGE UP
LEUKOCYTE ESTERASE UR-ACNC: NEGATIVE — SIGNIFICANT CHANGE UP
MAGNESIUM SERPL-MCNC: 1.8 MG/DL — SIGNIFICANT CHANGE UP (ref 1.6–2.6)
MCHC RBC-ENTMCNC: 32.9 PG — SIGNIFICANT CHANGE UP (ref 27–34)
MCHC RBC-ENTMCNC: 33.4 GM/DL — SIGNIFICANT CHANGE UP (ref 32–36)
MCV RBC AUTO: 98.3 FL — SIGNIFICANT CHANGE UP (ref 80–100)
NITRITE UR-MCNC: NEGATIVE — SIGNIFICANT CHANGE UP
PH UR: 5.5 — SIGNIFICANT CHANGE UP (ref 5–8)
PHOSPHATE SERPL-MCNC: 3.4 MG/DL — SIGNIFICANT CHANGE UP (ref 2.5–4.5)
PLATELET # BLD AUTO: 240 K/UL — SIGNIFICANT CHANGE UP (ref 150–400)
POTASSIUM SERPL-MCNC: 3.6 MMOL/L — SIGNIFICANT CHANGE UP (ref 3.5–5.3)
POTASSIUM SERPL-SCNC: 3.6 MMOL/L — SIGNIFICANT CHANGE UP (ref 3.5–5.3)
PROT SERPL-MCNC: 4.9 G/DL — LOW (ref 6–8.3)
PROT UR-MCNC: ABNORMAL
RBC # BLD: 2.95 M/UL — LOW (ref 4.2–5.8)
RBC # FLD: 16.5 % — HIGH (ref 10.3–14.5)
RBC CASTS # UR COMP ASSIST: 4 /HPF — SIGNIFICANT CHANGE UP (ref 0–4)
SODIUM SERPL-SCNC: 135 MMOL/L — SIGNIFICANT CHANGE UP (ref 135–145)
SP GR SPEC: 1.01 — SIGNIFICANT CHANGE UP
UROBILINOGEN FLD QL: NEGATIVE — SIGNIFICANT CHANGE UP
WBC # BLD: 15.33 K/UL — HIGH (ref 3.8–10.5)
WBC # FLD AUTO: 15.33 K/UL — HIGH (ref 3.8–10.5)
WBC UR QL: 2 /HPF — SIGNIFICANT CHANGE UP (ref 0–5)

## 2018-09-20 PROCEDURE — 99222 1ST HOSP IP/OBS MODERATE 55: CPT

## 2018-09-20 PROCEDURE — 12345: CPT | Mod: NC,GC

## 2018-09-20 PROCEDURE — 76705 ECHO EXAM OF ABDOMEN: CPT | Mod: 26

## 2018-09-20 PROCEDURE — 99223 1ST HOSP IP/OBS HIGH 75: CPT | Mod: AI,GC

## 2018-09-20 PROCEDURE — 93306 TTE W/DOPPLER COMPLETE: CPT | Mod: 26

## 2018-09-20 PROCEDURE — 99233 SBSQ HOSP IP/OBS HIGH 50: CPT | Mod: GC

## 2018-09-20 RX ORDER — METOPROLOL TARTRATE 50 MG
12.5 TABLET ORAL
Qty: 0 | Refills: 0 | Status: DISCONTINUED | OUTPATIENT
Start: 2018-09-20 | End: 2018-09-20

## 2018-09-20 RX ORDER — CEFOTAXIME SODIUM 1 G
2 VIAL (EA) INJECTION EVERY 8 HOURS
Qty: 0 | Refills: 0 | Status: DISCONTINUED | OUTPATIENT
Start: 2018-09-20 | End: 2018-09-20

## 2018-09-20 RX ORDER — DIGOXIN 250 MCG
0.12 TABLET ORAL DAILY
Qty: 0 | Refills: 0 | Status: DISCONTINUED | OUTPATIENT
Start: 2018-09-21 | End: 2018-09-22

## 2018-09-20 RX ORDER — CEFOTAXIME SODIUM 1 G
VIAL (EA) INJECTION
Qty: 0 | Refills: 0 | Status: DISCONTINUED | OUTPATIENT
Start: 2018-09-20 | End: 2018-09-20

## 2018-09-20 RX ORDER — CEFTRIAXONE 500 MG/1
1 INJECTION, POWDER, FOR SOLUTION INTRAMUSCULAR; INTRAVENOUS EVERY 24 HOURS
Qty: 0 | Refills: 0 | Status: DISCONTINUED | OUTPATIENT
Start: 2018-09-20 | End: 2018-09-22

## 2018-09-20 RX ORDER — LIPASE/PROTEASE/AMYLASE 16-48-48K
1 CAPSULE,DELAYED RELEASE (ENTERIC COATED) ORAL THREE TIMES A DAY
Qty: 0 | Refills: 0 | Status: DISCONTINUED | OUTPATIENT
Start: 2018-09-20 | End: 2018-09-22

## 2018-09-20 RX ORDER — DIGOXIN 250 MCG
0.5 TABLET ORAL ONCE
Qty: 0 | Refills: 0 | Status: DISCONTINUED | OUTPATIENT
Start: 2018-09-20 | End: 2018-09-20

## 2018-09-20 RX ORDER — METOPROLOL TARTRATE 50 MG
25 TABLET ORAL
Qty: 0 | Refills: 0 | Status: DISCONTINUED | OUTPATIENT
Start: 2018-09-20 | End: 2018-09-22

## 2018-09-20 RX ORDER — PANTOPRAZOLE SODIUM 20 MG/1
40 TABLET, DELAYED RELEASE ORAL
Qty: 0 | Refills: 0 | Status: DISCONTINUED | OUTPATIENT
Start: 2018-09-20 | End: 2018-09-22

## 2018-09-20 RX ORDER — CEFOTAXIME SODIUM 1 G
2 VIAL (EA) INJECTION ONCE
Qty: 0 | Refills: 0 | Status: COMPLETED | OUTPATIENT
Start: 2018-09-20 | End: 2018-09-20

## 2018-09-20 RX ORDER — DIGOXIN 250 MCG
0.5 TABLET ORAL ONCE
Qty: 0 | Refills: 0 | Status: COMPLETED | OUTPATIENT
Start: 2018-09-20 | End: 2018-09-20

## 2018-09-20 RX ADMIN — Medication 1 CAPSULE(S): at 22:01

## 2018-09-20 RX ADMIN — PANTOPRAZOLE SODIUM 40 MILLIGRAM(S): 20 TABLET, DELAYED RELEASE ORAL at 05:53

## 2018-09-20 RX ADMIN — Medication 0.5 MILLIGRAM(S): at 17:55

## 2018-09-20 RX ADMIN — CEFTRIAXONE 100 GRAM(S): 500 INJECTION, POWDER, FOR SOLUTION INTRAMUSCULAR; INTRAVENOUS at 10:13

## 2018-09-20 RX ADMIN — Medication 25 MILLIGRAM(S): at 11:25

## 2018-09-20 RX ADMIN — Medication 12.5 MILLIGRAM(S): at 03:18

## 2018-09-20 RX ADMIN — Medication 1 CAPSULE(S): at 05:53

## 2018-09-20 RX ADMIN — Medication 1 CAPSULE(S): at 14:28

## 2018-09-20 RX ADMIN — Medication 100 GRAM(S): at 03:28

## 2018-09-20 RX ADMIN — Medication 25 MILLIGRAM(S): at 19:03

## 2018-09-20 NOTE — CONSULT NOTE ADULT - PROBLEM SELECTOR RECOMMENDATION 9
- Metastatic pancreatic adenocarcinoma with liver mets and ascites, currently on Gemzar/abraxane  - Received 2 doses at [facility] (8/24, 9/5). Missed 3rd dose (9/19). Rescheduled 3rd dose for next Monday (9/24)  - ABx treatment per primary team

## 2018-09-20 NOTE — PROGRESS NOTE ADULT - PROBLEM SELECTOR PLAN 3
Most likely secondary to hypotension due A-fib RVR during paracentesis.     - Will continue to monitor BUN/Cr.

## 2018-09-20 NOTE — H&P ADULT - PROBLEM SELECTOR PLAN 3
-Met SIRS criteria w/ tachycardia (though 2/2 afib w/ RVR), tachypnea (likely 2/2 ascites) and leukocytosis, unclear source, concern for ?SBP  -CXR w/o consolidation/infiltrate  -F/u blood cx sent in ED.   -Will also send UA and urine cx  -Will start empirically with ceftriaxone for now,. De-escalate with cx result.   -Will get paracentesis culture result from [facility] in AM -Met SIRS criteria w/ tachycardia (though 2/2 afib w/ RVR), tachypnea (likely 2/2 ascites) and leukocytosis, unclear source, concern for ?SBP  -CXR w/o consolidation/infiltrate  -F/u blood cx sent in ED.   -Will also send UA and urine cx  -Will start empirically with cefotaxime for now,. De-escalate with cx result.   -Will get paracentesis culture result from [facility] in AM

## 2018-09-20 NOTE — CONSULT NOTE ADULT - ASSESSMENT
73-yo M w/ PMH of pancreatic adenocarcinoma mets to the liver, s/p biliary stent, currently on Gemzar/abraxane, afib on Xarelto, and HTN, sent from [facility] for hypotension s/p paracentesis.

## 2018-09-20 NOTE — DIETITIAN INITIAL EVALUATION ADULT. - ENERGY NEEDS
Height: 68 inches, Weight: 189 pounds (9/20, standing)  BMI: 28.7 kg/m2 IBW: 154 pounds (+/-10%), %IBW: 123%  Pertinent Info: Ascites noted, no pressure ulcers noted at this time.   Other pertinent info: Pt is 73yoM admitted for hypotension s/p therapeutic paracentesis at clinic. Pt with recently diagnosed metastatic pancreatic adenocarcinoma s/p biliary stent, with ascites. PMH includes Afib.

## 2018-09-20 NOTE — DIETITIAN INITIAL EVALUATION ADULT. - PHYSICAL APPEARANCE
BMI 28.7, pt with ascites. Nutrition Focused Physical Exam performed with pt's verbal consent with the following findings: moderate muscle wasting to temporal, clavicle. Moderate fat loss to orbital. NFPE was brief as pt appeared emotional at time of assessment.

## 2018-09-20 NOTE — H&P ADULT - PROBLEM SELECTOR PLAN 8
-On home Xarelto, hold pharmacological DVT ppx in anticipation for therapeutic LVP.   -SCDs. -On home Xarelto, hold pharmacological DVT ppx in anticipation for therapeutic LVP.   -SCDs.    -Nutrition c/s

## 2018-09-20 NOTE — PROCEDURE NOTE - ADDITIONAL PROCEDURE DETAILS
Procedure was aborted, only subcutaneous lidocaine was given. Patient's wife who came into the room deferred procedure for IR tomorrow and patient agreed. Procedure was aborted, only subcutaneous lidocaine was given. Patient's wife who came into the room deferred procedure to IR tomorrow and patient agreed.

## 2018-09-20 NOTE — DIETITIAN INITIAL EVALUATION ADULT. - NS AS NUTRI INTERV MEALS SNACK
Continue to provide current diet order, no therapeutic diet restrictions indicated at this time. Food preferences obtained./General/healthful diet

## 2018-09-20 NOTE — CONSULT NOTE ADULT - SUBJECTIVE AND OBJECTIVE BOX
MRN-[id number]    CHIEF COMPLAINT:  Hypotension, Abdominal distention    HISTORY OF PRESENT ILLNESS:  ROYCE NORMAN is a 73y Male patient with past medical history of metastatic pancreatic adenocarcinoma s/p biliary stent (mets to liver, started Gemzar/abraxane 18), afib on xarelto, HTN presents with plans for paracentesis complicated by atrial fibrillation with RVR. Cardiology consulted for further management.     Allergies  No Known Allergies    PAST MEDICAL & SURGICAL HISTORY:  Pancreatic cancer: metastatic to liver  HTN (hypertension)  Afib  History of coronary artery stent placement  Carpal tunnel syndrome  S/P abdominal paracentesis: aug 30, 2018  H/O knee surgery    FAMILY HISTORY:  Family history of liver cancer (Mother): In mother,  in her 50&#x27;s    SOCIAL HISTORY:    Non-smoker    REVIEW OF SYSTEMS:  CONSTITUTIONAL: No fever, weight loss, Positive fatigue  EYES: No eye pain  ENMT:  No difficulty hearing, tinnitus  NECK: No pain or stiffness  RESPIRATORY: No cough, wheezing, chills or hemoptysis; Positive Shortness of Breath  CARDIOVASCULAR: No chest pain, palpitations, passing out, dizziness, or leg swelling  GASTROINTESTINAL: Positive abdominal and epigastric pain. No nausea, vomiting, or hematemesis  GENITOURINARY: No dysuria, frequency, hematuria, or incontinence  NEUROLOGICAL: No headaches, memory loss  SKIN: No itching, burning, rashes, or lesions   LYMPH Nodes: No enlarged glands  ENDOCRINE: No heat or cold intolerance; No hair loss  MUSCULOSKELETAL: No muscle, back, or extremity pain  PSYCHIATRIC: No depression, anxiety, mood swings, or difficulty sleeping  HEME/LYMPH: No easy bruising, or bleeding gums  ALLERY AND IMMUNOLOGIC: No hives or eczema	    I&O's Summary    19 Sep 2018 07:  -  20 Sep 2018 07:00  --------------------------------------------------------  IN: 0 mL / OUT: 400 mL / NET: -400 mL    20 Sep 2018 07:  -  20 Sep 2018 15:46  --------------------------------------------------------  IN: 480 mL / OUT: 600 mL / NET: -120 mL    PHYSICAL EXAM:  Vital Signs Last 24 Hrs  T(C): 36.6 (20 Sep 2018 11:25), Max: 37.2 (19 Sep 2018 17:41)  T(F): 97.8 (20 Sep 2018 11:25), Max: 98.9 (19 Sep 2018 17:41)  HR: 117 (20 Sep 2018 11:25) (107 - 131)  BP: 104/69 (20 Sep 2018 11:25) (100/69 - 114/79)  RR: 18 (20 Sep 2018 11:25) (16 - 22)  SpO2: 92% (20 Sep 2018 11:25) (92% - 100%)    Appearance: Normal	  HEENT:   Normal oral mucosa  Lymphatic: No lymphadenopathy  Cardiovascular: irreg, tachycardic, No murmurs, No edema  Respiratory: Lungs clear to auscultation	  Psychiatry: A & O x 3, Mood & affect appropriate  Gastrointestinal:   Skin: No rashes, No ecchymoses, No cyanosis	  Neurologic: Non-focal  Extremities:No clubbing, cyanosis or edema  Vascular: Peripheral pulses palpable 2+ bilaterally    MEDICATIONS:  MEDICATIONS  (STANDING):  amylase/lipase/protease  (CREON 12,000 Units) 1 Capsule(s) Oral three times a day  cefTRIAXone   IVPB 1 Gram(s) IV Intermittent every 24 hours  metoprolol tartrate 25 milliGRAM(s) Oral two times a day  pantoprazole    Tablet 40 milliGRAM(s) Oral before breakfast    LABS:	 	  CBC Full  -  ( 20 Sep 2018 07:44 )  WBC Count : 15.33 K/uL  Hemoglobin : 9.7 g/dL  Hematocrit : 29.0 %  Platelet Count - Automated : 240 K/uL  Mean Cell Volume : 98.3 fl  Mean Cell Hemoglobin : 32.9 pg  Mean Cell Hemoglobin Concentration : 33.4 gm/dL        135  |  102  |  11  ----------------------------<  108<H>  3.6   |  23  |  1.05      137  |  101  |  14  ----------------------------<  117<H>  3.2<L>   |  22  |  1.16    Ca    7.9<L>      20 Sep 2018 06:34  Ca    7.8<L>      19 Sep 2018 18:17  Phos  3.4       Mg     1.8         TPro  4.9<L>  /  Alb  2.4<L>  /  TBili  1.6<H>  /  DBili  x   /  AST  36  /  ALT  15  /  AlkPhos  291<H>    TPro  5.3<L>  /  Alb  2.3<L>  /  TBili  1.7<H>  /  DBili  x   /  AST  38  /  ALT  19  /  AlkPhos  376<H>      PT/INR - ( 19 Sep 2018 18:17 )   PT: 14.2 sec;   INR: 1.31 ratio      PTT - ( 19 Sep 2018 18:17 )  PTT:32.2 sec    TELEMETRY: Atrial fibrillation RVR    CARDIAC TESTING/STUDIES:    N/A  	  ASSESSMENT/PLAN: 	  73y Male patient with past medical history of metastatic pancreatic adenocarcinoma s/p biliary stent (mets to liver, started Gemzar/abraxane 18), afib on xarelto, HTN presents with plans for paracentesis complicated by atrial fibrillation with RVR.    1) Atrial Fibrillation RVR  Rates 140s  Asymptomatic   Low/normal BP    ·	Continue rate control with metoprolol tartrate 25 mg BID and up-titrate as tolerated.   ·	Holding A/C due to pending paracentesis.   ·	Start digoxin load 500 mcg once and  mcg daily thereafter  ·	Continue treating underlying medical problems   ·	Pending ECHO    Bi Pat MD, MPH, [place]INA  Cardiovascular Specialist Attending  [facility]  C: [phone]  E: [email or web address]  (Cardiology Nocturnist cell number available 7 pm - 7 am every night; available daytime week days for follow-up only; daytime weekends covered by general cardiology consult service)

## 2018-09-20 NOTE — H&P ADULT - PROBLEM SELECTOR PLAN 6
-Recently diagnosed metastatic pancreatic cancer, s/p biliary stent, s/p Gemzar/abraxane 9/5/18  -Oncology c/s in AM -Recently diagnosed metastatic pancreatic cancer, s/p biliary stent, s/p Gemzar/abraxane 9/5/18  -C/w creon  -Oncology c/s in AM

## 2018-09-20 NOTE — PROGRESS NOTE ADULT - PROBLEM SELECTOR PLAN 2
- Oncology consulted. - Oncology consulted.  - Will have GOC discussion.  - Plan for diagnostic paracentesis for possible SBP. - Oncology consulted.  - Will have GOC discussion.  - Plan for diagnostic paracentesis for possible SBP.  - IR consulted obtained ultrasound abdomen imaging, will keep INR<2 and Platelets >50.

## 2018-09-20 NOTE — PROGRESS NOTE ADULT - PROBLEM SELECTOR PLAN 5
- Oncology consulted.  - Will have GOC discussion.  - Will continue CREON (Pancreatic Enzyme supplmentation).

## 2018-09-20 NOTE — DIETITIAN INITIAL EVALUATION ADULT. - NS AS NUTRI INTERV MEDICAL AND FOOD SUPPLEMENTS
Provide 1 Ensure Clear daily (provides 240kcal, 8g protein). Provide 1 Brammo Shake daily./Commercial beverage

## 2018-09-20 NOTE — PROGRESS NOTE ADULT - SUBJECTIVE AND OBJECTIVE BOX
Dr. Rashmi Mauro  Internal Medicine PGY-1   Pager# [phone]    Patient is a 73y old  Male who presents with a chief complaint of hypotension (20 Sep 2018 00:42)      SUBJECTIVE / OVERNIGHT EVENTS:    MEDICATIONS  (STANDING):  amylase/lipase/protease  (CREON 12,000 Units) 1 Capsule(s) Oral three times a day  cefotaxime  IVPB      cefotaxime  IVPB 2 Gram(s) IV Intermittent every 8 hours  metoprolol tartrate 12.5 milliGRAM(s) Oral two times a day  pantoprazole    Tablet 40 milliGRAM(s) Oral before breakfast    MEDICATIONS  (PRN):      Vital Signs Last 24 Hrs  T(C): 36.8 (20 Sep 2018 05:07), Max: 37.2 (19 Sep 2018 17:41)  T(F): 98.2 (20 Sep 2018 05:07), Max: 98.9 (19 Sep 2018 17:41)  HR: 131 (20 Sep 2018 05:07) (107 - 131)  BP: 102/66 (20 Sep 2018 05:07) (100/69 - 114/79)  BP(mean): --  RR: 18 (20 Sep 2018 05:07) (16 - 22)  SpO2: 95% (20 Sep 2018 05:07) (95% - 100%)  CAPILLARY BLOOD GLUCOSE        I&O's Summary      PHYSICAL EXAM:  GENERAL: NAD, well-developed  HEAD:  Atraumatic, Normocephalic  EYES: EOMI, PERRLA, conjunctiva and sclera clear  NECK: Supple, No JVD  CHEST/LUNG: Clear to auscultation bilaterally; No wheeze  HEART: Regular rate and rhythm; No murmurs, rubs, or gallops  ABDOMEN: Soft, Nontender, Nondistended; Bowel sounds present  EXTREMITIES:  2+ Peripheral Pulses, No clubbing, cyanosis, or edema  PSYCH: AAOx3  NEUROLOGY: non-focal  SKIN: No rashes or lesions Dr. Rashmi Mauro  Internal Medicine PGY-1   Pager# [phone]    Patient is a 73y old  Male who presents with a chief complaint of hypotension (20 Sep 2018 00:42)      SUBJECTIVE: At bedside patient denies any chest pain, SOB, abdominal pain, fevers, chills.      Telemetry: Atrial fibrillation with RVR.      MEDICATIONS  (STANDING):  amylase/lipase/protease  (CREON 12,000 Units) 1 Capsule(s) Oral three times a day  cefotaxime  IVPB      cefotaxime  IVPB 2 Gram(s) IV Intermittent every 8 hours  metoprolol tartrate 12.5 milliGRAM(s) Oral two times a day  pantoprazole    Tablet 40 milliGRAM(s) Oral before breakfast    MEDICATIONS  (PRN):      Vital Signs Last 24 Hrs  T(C): 36.8 (20 Sep 2018 05:07), Max: 37.2 (19 Sep 2018 17:41)  T(F): 98.2 (20 Sep 2018 05:07), Max: 98.9 (19 Sep 2018 17:41)  HR: 131 (20 Sep 2018 05:07) (107 - 131)  BP: 102/66 (20 Sep 2018 05:07) (100/69 - 114/79)  BP(mean): --  RR: 18 (20 Sep 2018 05:07) (16 - 22)  SpO2: 95% (20 Sep 2018 05:07) (95% - 100%)  CAPILLARY BLOOD GLUCOSE        I&O's Summary      PHYSICAL EXAM:  GENERAL: NAD, well-developed  HEAD:  Atraumatic, Normocephalic  EYES: EOMI, PERRLA, conjunctiva and sclera clear  NECK: Supple, No JVD  CHEST/LUNG: Clear to auscultation bilaterally; No wheeze  HEART: Regular rate and rhythm; No murmurs, rubs, or gallops  ABDOMEN: Distended abdomen, non tender, dull to percussion at flanks and tympanic in epigastrum.   EXTREMITIES:  2+ Peripheral Pulses, No clubbing, cyanosis. Peripheral edema +1 b/l up to ankles.   PSYCH: AAOx3  NEUROLOGY: non-focal  SKIN: No rashes or lesions      LABS:                        9.7    15.33 )-----------( 240      ( 20 Sep 2018 07:44 )             29.0     09-20    135  |  102  |  11  ----------------------------<  108<H>  3.6   |  23  |  1.05    Ca    7.9<L>      20 Sep 2018 06:34  Phos  3.4       Mg     1.8         TPro  4.9<L>  /  Alb  2.4<L>  /  TBili  1.6<H>  /  DBili  x   /  AST  36  /  ALT  15  /  AlkPhos  291<H>      PT/INR - ( 19 Sep 2018 18:17 )   PT: 14.2 sec;   INR: 1.31 ratio         PTT - ( 19 Sep 2018 18:17 )  PTT:32.2 sec      Urinalysis Basic - ( 20 Sep 2018 05:33 )    Color: Yellow / Appearance: Clear / S.014 / pH: x  Gluc: x / Ketone: Negative  / Bili: Negative / Urobili: Negative   Blood: x / Protein: Trace / Nitrite: Negative   Leuk Esterase: Negative / RBC: 4 /hpf / WBC 2 /hpf   Sq Epi: x / Non Sq Epi: 0 /hpf / Bacteria: Negative Dr. Rashmi Mauro  Internal Medicine PGY-1   Pager# [phone]    Patient is a 73y old  Male who presents with a chief complaint of hypotension (20 Sep 2018 00:42)      SUBJECTIVE: At bedside patient denies any chest pain, SOB, abdominal pain, fevers, chills. Reports his abdominal distention is making him uncomfortable, but not SOB. Wife is at bedside.     Telemetry: Atrial fibrillation with RVR.      MEDICATIONS  (STANDING):  amylase/lipase/protease  (CREON 12,000 Units) 1 Capsule(s) Oral three times a day  cefTRIAXone   IVPB 1 Gram(s) IV Intermittent every 24 hours  metoprolol tartrate 25 milliGRAM(s) Oral two times a day  pantoprazole    Tablet 40 milliGRAM(s) Oral before breakfast    MEDICATIONS  (PRN):      Vital Signs Last 24 Hrs  T(C): 36.8 (20 Sep 2018 05:07), Max: 37.2 (19 Sep 2018 17:41)  T(F): 98.2 (20 Sep 2018 05:07), Max: 98.9 (19 Sep 2018 17:41)  HR: 131 (20 Sep 2018 05:07) (107 - 131)  BP: 102/66 (20 Sep 2018 05:07) (100/69 - 114/79)  BP(mean): --  RR: 18 (20 Sep 2018 05:07) (16 - 22)  SpO2: 95% (20 Sep 2018 05:07) (95% - 100%)  CAPILLARY BLOOD GLUCOSE        I&O's Summary      PHYSICAL EXAM:  GENERAL: NAD, well-developed  HEAD:  Atraumatic, Normocephalic  EYES: EOMI, PERRLA, conjunctiva and sclera clear  NECK: Supple, No JVD  CHEST/LUNG: Clear to auscultation bilaterally; No wheeze  HEART: Regular rate and rhythm; No murmurs, rubs, or gallops  ABDOMEN: Distended abdomen, non tender, dull to percussion at flanks and tympanic in epigastrum.   EXTREMITIES:  2+ Peripheral Pulses, No clubbing, cyanosis. Peripheral edema +1 b/l up to ankles.   PSYCH: AAOx3  NEUROLOGY: non-focal  SKIN: No rashes or lesions      LABS:                        9.7    15.33 )-----------( 240      ( 20 Sep 2018 07:44 )             29.0     09-20    135  |  102  |  11  ----------------------------<  108<H>  3.6   |  23  |  1.05    Ca    7.9<L>      20 Sep 2018 06:34  Phos  3.4       Mg     1.8         TPro  4.9<L>  /  Alb  2.4<L>  /  TBili  1.6<H>  /  DBili  x   /  AST  36  /  ALT  15  /  AlkPhos  291<H>      PT/INR - ( 19 Sep 2018 18:17 )   PT: 14.2 sec;   INR: 1.31 ratio         PTT - ( 19 Sep 2018 18:17 )  PTT:32.2 sec      Urinalysis Basic - ( 20 Sep 2018 05:33 )    Color: Yellow / Appearance: Clear / S.014 / pH: x  Gluc: x / Ketone: Negative  / Bili: Negative / Urobili: Negative   Blood: x / Protein: Trace / Nitrite: Negative   Leuk Esterase: Negative / RBC: 4 /hpf / WBC 2 /hpf   Sq Epi: x / Non Sq Epi: 0 /hpf / Bacteria: Negative Dr. Rashmi Mauro  Internal Medicine PGY-1   Pager# [phone]    Patient is a 73y old  Male who presents with a chief complaint of hypotension (20 Sep 2018 00:42)    SUBJECTIVE: At bedside patient denies any chest pain, SOB, abdominal pain, fevers, chills. Reports his abdominal distention is making him uncomfortable, but not SOB. Wife is at bedside.     Telemetry: Atrial fibrillation with RVR.      MEDICATIONS  (STANDING):  amylase/lipase/protease  (CREON 12,000 Units) 1 Capsule(s) Oral three times a day  cefTRIAXone   IVPB 1 Gram(s) IV Intermittent every 24 hours  metoprolol tartrate 25 milliGRAM(s) Oral two times a day  pantoprazole    Tablet 40 milliGRAM(s) Oral before breakfast    MEDICATIONS  (PRN):      Vital Signs Last 24 Hrs  T(C): 36.8 (20 Sep 2018 05:07), Max: 37.2 (19 Sep 2018 17:41)  T(F): 98.2 (20 Sep 2018 05:07), Max: 98.9 (19 Sep 2018 17:41)  HR: 131 (20 Sep 2018 05:07) (107 - 131)  BP: 102/66 (20 Sep 2018 05:07) (100/69 - 114/79)  BP(mean): --  RR: 18 (20 Sep 2018 05:07) (16 - 22)  SpO2: 95% (20 Sep 2018 05:07) (95% - 100%)  CAPILLARY BLOOD GLUCOSE        I&O's Summary      PHYSICAL EXAM:  GENERAL: NAD, well-developed  HEAD:  Atraumatic, Normocephalic  EYES: EOMI, PERRLA, conjunctiva and sclera clear  NECK: Supple, No JVD  CHEST/LUNG: Clear to auscultation bilaterally; No wheeze  HEART: Regular rate and rhythm; No murmurs, rubs, or gallops  ABDOMEN: Distended abdomen, non tender, dull to percussion at flanks and tympanic in epigastrum.   EXTREMITIES:  2+ Peripheral Pulses, No clubbing, cyanosis. Peripheral edema +1 b/l up to ankles.   PSYCH: AAOx3  NEUROLOGY: non-focal  SKIN: No rashes or lesions      LABS:                        9.7    15.33 )-----------( 240      ( 20 Sep 2018 07:44 )             29.0     09-20    135  |  102  |  11  ----------------------------<  108<H>  3.6   |  23  |  1.05    Ca    7.9<L>      20 Sep 2018 06:34  Phos  3.4       Mg     1.8         TPro  4.9<L>  /  Alb  2.4<L>  /  TBili  1.6<H>  /  DBili  x   /  AST  36  /  ALT  15  /  AlkPhos  291<H>      PT/INR - ( 19 Sep 2018 18:17 )   PT: 14.2 sec;   INR: 1.31 ratio       Consultant reviewed: cards/onc  PTT - ( 19 Sep 2018 18:17 )  PTT:32.2 sec      Urinalysis Basic - ( 20 Sep 2018 05:33 )    Color: Yellow / Appearance: Clear / S.014 / pH: x  Gluc: x / Ketone: Negative  / Bili: Negative / Urobili: Negative   Blood: x / Protein: Trace / Nitrite: Negative   Leuk Esterase: Negative / RBC: 4 /hpf / WBC 2 /hpf   Sq Epi: x / Non Sq Epi: 0 /hpf / Bacteria: Negative

## 2018-09-20 NOTE — DIETITIAN INITIAL EVALUATION ADULT. - PROBLEM SELECTOR PLAN 3
-Met SIRS criteria w/ tachycardia (though 2/2 afib w/ RVR), tachypnea (likely 2/2 ascites) and leukocytosis, unclear source, concern for ?SBP  -CXR w/o consolidation/infiltrate  -F/u blood cx sent in ED.   -Will also send UA and urine cx  -Will start empirically with cefotaxime for now,. De-escalate with cx result.   -Will get paracentesis culture result from [facility] in AM

## 2018-09-20 NOTE — H&P ADULT - HISTORY OF PRESENT ILLNESS
74yo male with PMHx significant for recently diagnosed metastatic pancreatic adenocarcinoma s/p biliary stent (mets to liver, s/p Gemzar/abraxane 9/5/18, c/b portal vein thrombosis resulting in malignant recurrent ascties), Afib on xarelto, HTN present from [facility] with hypotension after removing 1L from therapeutic paracentesis. Patient's wife at bedside providing most history. Patient went to see his oncology yesterday for chemotherapy, found to have worsening abdominal distention and SOB, was sent over for urgent therapeutic paracentesis. After removing 1L fluid, patient was noted to be hypotensive to 60/40 and went into afib with RVR. Patient appears lethargic at that time per wife. He was given 50 of albumin and 150cc bolus and subsequently transferred to the hospital for further management. He had 2 rounds of therapeutic paracentesis (~5-6L removed everytime) and has been tolerating the procedure well in the past. Denied fever, chill, nausea, vomiting, CP, abdominal pain, diarrhea, constipation, or urinary symptoms. Endorse mildly SOB. The wife also states that the patient has been diagnosed with afib for 3 years, and since then, his heart rates has been around 120-130s.    In the ED, initial VS T98.9F, , /69, RR 18 SpO2 97% on 2L NC. Labs significant for WBC 14, hgb 10.8, K 3.2, alb 2.3, bili 1.7, , CXR with small b/l effusion. He was given 1L NS, 100ml albumin and KCl 40mg X1. 74yo male with PMHx significant for recently diagnosed metastatic pancreatic adenocarcinoma s/p biliary stent (mets to liver, s/p Gemzar/abraxane 9/5/18, c/b portal vein thrombosis resulting in malignant recurrent ascties), Afib on xarelto, HTN present from [facility] with hypotension after removing 1L from therapeutic paracentesis. Patient's wife at bedside providing most history. Patient went to see his oncology yesterday for chemotherapy, found to have worsening abdominal distention and SOB, was sent over for urgent therapeutic paracentesis. Pt denied abdominal pain.  After removing 1L fluid, patient was noted to be hypotensive to 60/40 and went into afib with RVR. Patient appears lethargic at that time per wife. He was given 50 of albumin and 150cc bolus and subsequently transferred to the hospital for further management. He had 2 rounds of therapeutic paracentesis (~5-6L removed everytime) and has been tolerating the procedure well in the past. Denied fever, chill, nausea, vomiting, CP, abdominal pain, diarrhea, constipation, or urinary symptoms. Endorse mildly SOB. The wife also states that the patient has been diagnosed with afib for 3 years, and since then, his heart rates has been around 120-130s.    In the ED, initial VS T98.9F, , /69, RR 18 SpO2 97% on 2L NC. Labs significant for WBC 14, hgb 10.8, K 3.2, alb 2.3, bili 1.7, , CXR with small b/l effusion. He was given 1L NS, 100ml albumin and KCl 40mg X1.

## 2018-09-20 NOTE — H&P ADULT - NEGATIVE GASTROINTESTINAL SYMPTOMS
no constipation/no abdominal pain/no vomiting/no diarrhea/no nausea no hematochezia/no vomiting/no melena/no nausea/no diarrhea/no constipation/no abdominal pain

## 2018-09-20 NOTE — DIETITIAN INITIAL EVALUATION ADULT. - PROBLEM SELECTOR PLAN 4
-Likely malignant in setting of metastatic pancreatic cancer and portal vein thrombosis.  -May need another therapeutic LVP while in hospital - consult IR. Likely need imaging to quantify ascites.

## 2018-09-20 NOTE — H&P ADULT - NSHPPHYSICALEXAM_GEN_ALL_CORE
Vital Signs Last 24 Hrs  T(C): 37.2 (19 Sep 2018 21:58), Max: 37.2 (19 Sep 2018 17:41)  T(F): 98.9 (19 Sep 2018 21:58), Max: 98.9 (19 Sep 2018 17:41)  HR: 128 (19 Sep 2018 23:22) (107 - 130)  BP: 111/72 (19 Sep 2018 23:22) (102/77 - 114/79)  BP(mean): --  RR: 6 (19 Sep 2018 23:22) (6 - 22)  SpO2: 96% (19 Sep 2018 23:22) (96% - 100%)    PHYSICAL EXAM:  GENERAL: NAD, well-developed  HEAD:  Atraumatic, Normocephalic  EYES: EOMI, PERRLA, conjunctiva and sclera clear  CHEST/LUNG: Clear to auscultation bilaterally; on 2L NC, No wheeze  HEART: irregularly irregular with tachycardia; No murmurs, rubs, or gallops  ABDOMEN: Soft, Nontender, distended; Bowel sounds present  EXTREMITIES:  2+ Peripheral Pulses, No clubbing, cyanosis. 3+ pitting edema up to knee  PSYCH: AAOx3, appropriate mood and affect  NEUROLOGY: non-focal, no sensory or motor deficit.   SKIN: No rashes or lesions, right abdomen covered in bandage s/p paracentesis, appear clean w/o erythema.

## 2018-09-20 NOTE — H&P ADULT - NSHPSOCIALHISTORY_GEN_ALL_CORE
Former cigar smoker but only occasional (once every few months). Tried cigarettes briefly in [place] but did not smoke regularly  Rare social ETOH use. Lives at home with wife.

## 2018-09-20 NOTE — H&P ADULT - PROBLEM SELECTOR PLAN 4
-Likely malignant in setting of metastatic pancreatic cancer and portal vein thrombosis.  -Will do therapeutic LVP while in hospital - consult IR. Likely need imaging to asses amt of ascites. -Likely malignant in setting of metastatic pancreatic cancer and portal vein thrombosis.  -May need another therapeutic LVP while in hospital - consult IR. Likely need imaging to quantify ascites.

## 2018-09-20 NOTE — DIETITIAN INITIAL EVALUATION ADULT. - PROBLEM SELECTOR PLAN 2
-Patient went into afib with RVR, likely precipitated by hypotension and/or underlying infection  -Per wife, patient HR usually at 120-130s.  -Will start home metoprolol tartrate at lower dose of 12.5mg BID given hypotension. uptitrate as tolerated  -Will give albumin if patient develop hypotension  -Monitor on tele  -Hold Xarelto as pt may need further procedures (? repeat LVP, ? pigtail catheter)

## 2018-09-20 NOTE — CONSULT NOTE ADULT - ATTENDING COMMENTS
73 y.o male with pancreatic cancer on chemotherapy, admitted with hypotensio after paracentesis.  Patient most likely dehydrated, unclear if SBP.  Patient on antibiotics, if cultures negative and clinically improved, consider d.c. antibiotics.

## 2018-09-20 NOTE — DIETITIAN INITIAL EVALUATION ADULT. - SIGNS/SYMPTOMS
po meeting <75% estimated nutrient needs >1month, muscle wasting, fat loss, 10lb. wt loss x4-6 weeks

## 2018-09-20 NOTE — DIETITIAN INITIAL EVALUATION ADULT. - OTHER INFO
Pt seen on [facility] for nutrition consult. Pt reports decreased po intake and decreased appetite PTA due to ascites, pt feels full and prefers liquids with only minimal bites of food. Pt with improved appetite following paracentesis, but as fluid builds up again his appetite decreases. Wife reports paracentesis was being done every 8-9 days for past few weeks PTA. Pt with 10lb. wt loss noted per chart records, some due to fluid shifts/fluid loss from paracentesis. NFPE performed with findings above. Pt amenable to trying liquid nutrition supplements available: Ensure Clear and Mighty Health Shakes, does not like Ensure Enlive. Pt understands increased protein and calorie needs, encouraged po intake as feasible. Food preferences obtained and will be honored as feasible. Pt and family made aware RD remains available.

## 2018-09-20 NOTE — DIETITIAN INITIAL EVALUATION ADULT. - PROBLEM SELECTOR PLAN 6
-Recently diagnosed metastatic pancreatic cancer, s/p biliary stent, s/p Gemzar/abraxane 9/5/18  -C/w creon  -Oncology c/s in AM

## 2018-09-20 NOTE — DIETITIAN INITIAL EVALUATION ADULT. - ORAL INTAKE PTA
pt with variable po intake >1month PTA. Pt with ascites, would eat well following paracentesis, but eat poorly in between treatments as fluid would build up again. NKFA reported. No vitamin/mineral/herbal supplement use PTA. Pt would consume chocolate protein shake by [employer] on occasion, pt dislikes Boost and Ensure Enlive./fair

## 2018-09-20 NOTE — H&P ADULT - NSHPLABSRESULTS_GEN_ALL_CORE
EKG personally reviewed. Atrial fibrillation w/ rate of 128, no acute St/T wave changes.    .  Labs reviewed personally.                          10.8   14.0  )-----------( 250      ( 19 Sep 2018 18:17 )             31.8     Hgb Trend: 10.8<--, 11.6<--  09-19    137  |  101  |  14  ----------------------------<  117<H>  3.2<L>   |  22  |  1.16    Ca    7.8<L>      19 Sep 2018 18:17    TPro  5.3<L>  /  Alb  2.3<L>  /  TBili  1.7<H>  /  DBili  x   /  AST  38  /  ALT  19  /  AlkPhos  376<H>  09-19    Creatinine Trend: 1.16<--, 0.80<--, 0.69<--, 0.72<--, 0.71<--, 0.74<--  PT/INR - ( 19 Sep 2018 18:17 )   PT: 14.2 sec;   INR: 1.31 ratio         PTT - ( 19 Sep 2018 18:17 )  PTT:32.2 sec      Imaging reviewed personally.  < from: Xray Chest 1 View AP/PA (09.19.18 @ 18:32) >    INTERPRETATION:  Small bilateral effusions.    < end of copied text > EKG personally reviewed by me. Atrial fibrillation w/ rate of 128, no acute St/T wave changes.    .  Labs reviewed personally by me.                          10.8   14.0  )-----------( 250      ( 19 Sep 2018 18:17 )             31.8     Hgb Trend: 10.8<--, 11.6<--  09-19    137  |  101  |  14  ----------------------------<  117<H>  3.2<L>   |  22  |  1.16    Ca    7.8<L>      19 Sep 2018 18:17    TPro  5.3<L>  /  Alb  2.3<L>  /  TBili  1.7<H>  /  DBili  x   /  AST  38  /  ALT  19  /  AlkPhos  376<H>  09-19    Creatinine Trend: 1.16<--, 0.80<--, 0.69<--, 0.72<--, 0.71<--, 0.74<--  PT/INR - ( 19 Sep 2018 18:17 )   PT: 14.2 sec;   INR: 1.31 ratio         PTT - ( 19 Sep 2018 18:17 )  PTT:32.2 sec      Imaging reviewed personally by me.  < from: Xray Chest 1 View AP/PA (09.19.18 @ 18:32) >    INTERPRETATION:  Small bilateral effusions.    < end of copied text >

## 2018-09-20 NOTE — H&P ADULT - PROBLEM SELECTOR PLAN 2
-Patient went into afib with RVR, likely precipitated by hypotension and/or underlying infection  -Per wife, patient HR usually at 120-130s.  -Will start home metoprolol tartrate at lower dose of 25mg BID given hypotension.  -Will give albumin if patient develop hypotension  -Monitor on tele  -Hold Xarelto for now in anticipating for possible therapeutic paracentesis in AM -Patient went into afib with RVR, likely precipitated by hypotension and/or underlying infection  -Per wife, patient HR usually at 120-130s.  -Will start home metoprolol tartrate at lower dose of 12.5mg BID given hypotension. uptitrate as tolerated  -Will give albumin if patient develop hypotension  -Monitor on tele  -Hold Xarelto for now in anticipating for possible therapeutic paracentesis in AM -Patient went into afib with RVR, likely precipitated by hypotension and/or underlying infection  -Per wife, patient HR usually at 120-130s.  -Will start home metoprolol tartrate at lower dose of 12.5mg BID given hypotension. uptitrate as tolerated  -Will give albumin if patient develop hypotension  -Monitor on tele  -Hold Xarelto as pt may need further procedures (? repeat LVP, ? pigtail catheter)

## 2018-09-20 NOTE — PROGRESS NOTE ADULT - ASSESSMENT
Assessment:  · Assessment		  73M PMH recently diagnosed metastatic pancreatic adenocarcinoma s/p biliary stent (mets to liver, started Gemzar/abraxane 8/29/18), afib on xarelto, HTN presents with fever, generalized weakness, and poor appetite for one day. Likely septic, however source unclear at this time. With near-hypotension and afib RVR, rate somewhat improved after IVF resuscitation.       Fever. --Immunocompromised patient with fever, elevated HR, low BP, signs consistent with sepsis.  --source of infection unclear at this time. No focal symptoms, recent paracentesis, started chemo two days ago.   --UCx and BCx NTD.  --CXR with small L pleural effusion (possibly from hypoalbuminemia), R base atelectasis, but likely related to compressive atelectasis from ascites.    Discontinue zosyn. Observe.   --Tylenol for fever.   -- ID follow noted. Cleared for Discharge.    Atrial fibrillation with RVR.  --HR initially elevated in 130s-140s, now more consistently in 100. Rate control with Metoprolol. Discontinue coreg. Continue Xarelto for anticoagulation.   Cardiology follow.     Pancreatic cancer. --recent diagnosis and stated chemo two days ago.   --Bilirubin and liver enzymes significantly improved from when he had obstructive symptoms s/p biliary stent placement. Currently denies abdominal pain, significant nausea or vomiting.   --oncology follow.   --house GI evaluation noted.  ChemoRx as per Oncology.    Malignant Ascites. --s/p 5.5L paracentesis Will need repeat paracentesis soon.     Hypotension improving     Need for prophylactic measure. --anticoagulated with xarelto.  d/w wife, family, patient  QA    [place].  Aditya Frederick MD pager [phone] 73M PMH recently diagnosed metastatic pancreatic adenocarcinoma s/p biliary stent (mets to liver, started Gemzar/abraxane 8/29/18), afib on xarelto, HTN presents with fever, generalized weakness, and poor appetite for one day. Likely septic, however source unclear at this time. With near-hypotension and afib RVR, rate somewhat improved after IVF resuscitation. Now has improving blood pressures, however still in Atrial Fibrillation with RVR.

## 2018-09-20 NOTE — H&P ADULT - ASSESSMENT
74yo male with PMHx significant for recently diagnosed metastatic pancreatic adenocarcinoma s/p biliary stent (mets to liver, s/p Gemzar/abraxane 9/5/18, c/b portal vein thrombosis and malignant recurrent ascites), Afib on Xarelto HTN present from [facility] clinic with hypotension after removing 1L from therapeutic paracentesis, found to have SIRS concern for intra-abdominal source, and afib with RVR.

## 2018-09-20 NOTE — CONSULT NOTE ADULT - SUBJECTIVE AND OBJECTIVE BOX
*** INCOMPLETE RESIDENT NOTE ***          CC: hypotension    HPI:  73-yo M w/ PMH of pancreatic adenocarcinoma mets to the liver, s/p biliary stent, currently on Gemzar/abraxane, afib on Xarelto, and HTN, sent from [facility] for hypotension s/p paracentesis. Patient presented to [facility] for his scheduled chemotherapy yesterday. Paracentesis was attempted to remove ascites prior to chemo, however was aborted after taking out 1 L because of hypotension at 60s/40s. Patient was also found to be lethargic than his baseline and have a-fib with RVR. He was given 50 of albumin and 150 cc bolus NS. Patient was sent to the ER without receiving chemotherapy.    Of note, he started noticing weight loss and abdominal distension since 2018. On 18, he was found to have 4 cm pancreatic mass and omental carcinomatosis with intra and extrahepatic ductal dilatation. Ascites and liver metastasis were also noted. ERCP was performed and biliary stent was inserted. He was started on chemotherapy on Gemzar and abraxane on . He received second dose of chemo on , and was due for 3rd dose on , which he did not receive. He is currently rescheduled his 3rd chemo for next Monday (). Large volume paracentesis was performed on  and 9/10 (~ 6L each time) prior to his 3rd paracentesis that was performed on the day of presentation ().      REVIEW OF SYSTEMS:  CONSTITUTIONAL: No fever  RESPIRATORY: No cough, wheezing, chills or hemoptysis; No shortness of breath  CARDIOVASCULAR: No chest pain, palpitations, dizziness  GASTROINTESTINAL: Abdominal discomfort; denies abdominal pain; decreased appetite and PO intake      MEDICATIONS  (STANDING):  amylase/lipase/protease  (CREON 12,000 Units) 1 Capsule(s) Oral three times a day  cefTRIAXone   IVPB 1 Gram(s) IV Intermittent every 24 hours  metoprolol tartrate 25 milliGRAM(s) Oral two times a day  pantoprazole    Tablet 40 milliGRAM(s) Oral before breakfast    MEDICATIONS  (PRN):        CAPILLARY BLOOD GLUCOSE        I&O's Summary    19 Sep 2018 07:01  -  20 Sep 2018 07:00  --------------------------------------------------------  IN: 0 mL / OUT: 400 mL / NET: -400 mL      Vital Signs Last 24 Hrs  T(C): 36.6 (20 Sep 2018 11:25), Max: 37.2 (19 Sep 2018 17:41)  T(F): 97.8 (20 Sep 2018 11:25), Max: 98.9 (19 Sep 2018 17:41)  HR: 117 (20 Sep 2018 11:25) (107 - 131)  BP: 104/69 (20 Sep 2018 11:25) (100/69 - 114/79)  BP(mean): --  RR: 18 (20 Sep 2018 11:25) (16 - 22)  SpO2: 92% (20 Sep 2018 11:25) (92% - 100%)    PHYSICAL EXAM:  GENERAL: NAD, well-developed  HEENT: Atraumatic, Normocephalic; EOMI; jaundice noted in sclera and under the tongue  CHEST/LUNG: Mild inspiratory crackles right lower lung base; no wheezes  HEART: Regular rate and rhythm; No murmurs, rubs, or gallops  ABDOMEN: Firm, Nontender, distended; hypoactive BS  EXTREMITIES: 2+ pitting edema mid-shin bilaterally  PSYCH: AAOx3  NEUROLOGY: non-focal  SKIN: facial jaundice    LABS:                        9.7    15.33 )-----------( 240      ( 20 Sep 2018 07:44 )             29.0     09-20    135  |  102  |  11  ----------------------------<  108<H>  3.6   |  23  |  1.05    Ca    7.9<L>      20 Sep 2018 06:34  Phos  3.4     09-20  Mg     1.8     -20    TPro  4.9<L>  /  Alb  2.4<L>  /  TBili  1.6<H>  /  DBili  x   /  AST  36  /  ALT  15  /  AlkPhos  291<H>  09-20    PT/INR - ( 19 Sep 2018 18:17 )   PT: 14.2 sec;   INR: 1.31 ratio         PTT - ( 19 Sep 2018 18:17 )  PTT:32.2 sec      Urinalysis Basic - ( 20 Sep 2018 05:33 )    Color: Yellow / Appearance: Clear / S.014 / pH: x  Gluc: x / Ketone: Negative  / Bili: Negative / Urobili: Negative   Blood: x / Protein: Trace / Nitrite: Negative   Leuk Esterase: Negative / RBC: 4 /hpf / WBC 2 /hpf   Sq Epi: x / Non Sq Epi: 0 /hpf / Bacteria: Negative        RADIOLOGY & ADDITIONAL TESTS:    Imaging Personally Reviewed:    Consultant(s) Notes Reviewed:      Care Discussed with Consultants/Other Providers: *** RESIDENT NOTE PENDING ATTENDING REVIEW ***          CC: hypotension    HPI:  73-yo M w/ PMH of pancreatic adenocarcinoma mets to the liver, s/p biliary stent, currently on Gemzar/abraxane, afib on Xarelto, and HTN, sent from [facility] for hypotension s/p paracentesis. Patient presented to [facility] for his scheduled chemotherapy yesterday. Paracentesis was attempted to remove ascites prior to chemo, however was aborted after taking out 1 L because of hypotension at 60s/40s. Patient was also found to be lethargic than his baseline and have a-fib with RVR. He was given 50 of albumin and 150 cc bolus NS. Patient was sent to the ER without receiving chemotherapy.    Of note, he started noticing weight loss and abdominal distension since 2018. On 18, he was found to have 4 cm pancreatic mass and omental carcinomatosis with intra and extrahepatic ductal dilatation. Ascites and liver metastasis were also noted. ERCP was performed and biliary stent was inserted. He was started on chemotherapy on Gemzar and abraxane on . He received second dose of chemo on , and was due for 3rd dose on , which he did not receive. He is currently rescheduled his 3rd chemo for next Monday (). Large volume paracentesis was performed on  and 9/10 (~ 6L each time) prior to his 3rd paracentesis that was performed on the day of presentation ().      REVIEW OF SYSTEMS:  CONSTITUTIONAL: No fever  RESPIRATORY: No cough, wheezing, chills or hemoptysis; No shortness of breath  CARDIOVASCULAR: No chest pain, palpitations, dizziness  GASTROINTESTINAL: Abdominal discomfort; denies abdominal pain; decreased appetite and PO intake      MEDICATIONS  (STANDING):  amylase/lipase/protease  (CREON 12,000 Units) 1 Capsule(s) Oral three times a day  cefTRIAXone   IVPB 1 Gram(s) IV Intermittent every 24 hours  metoprolol tartrate 25 milliGRAM(s) Oral two times a day  pantoprazole    Tablet 40 milliGRAM(s) Oral before breakfast    MEDICATIONS  (PRN):        CAPILLARY BLOOD GLUCOSE        I&O's Summary    19 Sep 2018 07:01  -  20 Sep 2018 07:00  --------------------------------------------------------  IN: 0 mL / OUT: 400 mL / NET: -400 mL      Vital Signs Last 24 Hrs  T(C): 36.6 (20 Sep 2018 11:25), Max: 37.2 (19 Sep 2018 17:41)  T(F): 97.8 (20 Sep 2018 11:25), Max: 98.9 (19 Sep 2018 17:41)  HR: 117 (20 Sep 2018 11:25) (107 - 131)  BP: 104/69 (20 Sep 2018 11:25) (100/69 - 114/79)  BP(mean): --  RR: 18 (20 Sep 2018 11:25) (16 - 22)  SpO2: 92% (20 Sep 2018 11:25) (92% - 100%)    PHYSICAL EXAM:  GENERAL: NAD, well-developed  HEENT: Atraumatic, Normocephalic; EOMI; jaundice noted in sclera and under the tongue  CHEST/LUNG: Mild inspiratory crackles right lower lung base; no wheezes  HEART: Regular rate and rhythm; No murmurs, rubs, or gallops  ABDOMEN: Firm, Nontender, distended; hypoactive BS  EXTREMITIES: 2+ pitting edema mid-shin bilaterally  PSYCH: AAOx3  NEUROLOGY: non-focal  SKIN: facial jaundice    LABS:                        9.7    15.33 )-----------( 240      ( 20 Sep 2018 07:44 )             29.0     09-20    135  |  102  |  11  ----------------------------<  108<H>  3.6   |  23  |  1.05    Ca    7.9<L>      20 Sep 2018 06:34  Phos  3.4     -  Mg     1.8     -    TPro  4.9<L>  /  Alb  2.4<L>  /  TBili  1.6<H>  /  DBili  x   /  AST  36  /  ALT  15  /  AlkPhos  291<H>  09-20    PT/INR - ( 19 Sep 2018 18:17 )   PT: 14.2 sec;   INR: 1.31 ratio         PTT - ( 19 Sep 2018 18:17 )  PTT:32.2 sec      Urinalysis Basic - ( 20 Sep 2018 05:33 )    Color: Yellow / Appearance: Clear / S.014 / pH: x  Gluc: x / Ketone: Negative  / Bili: Negative / Urobili: Negative   Blood: x / Protein: Trace / Nitrite: Negative   Leuk Esterase: Negative / RBC: 4 /hpf / WBC 2 /hpf   Sq Epi: x / Non Sq Epi: 0 /hpf / Bacteria: Negative        RADIOLOGY & ADDITIONAL TESTS:    Imaging Personally Reviewed:    Consultant(s) Notes Reviewed:      Care Discussed with Consultants/Other Providers: CC: hypotension    HPI:  73-yo M w/ PMH of pancreatic adenocarcinoma mets to the liver, s/p biliary stent, currently on Gemzar/abraxane, afib on Xarelto, and HTN, sent from [facility] for hypotension s/p paracentesis. Patient presented to [facility] for his scheduled chemotherapy yesterday. Paracentesis was attempted to remove ascites prior to chemo, however was aborted after taking out 1 L because of hypotension at 60s/40s. Patient was also found to be lethargic than his baseline and have a-fib with RVR. He was given 50 of albumin and 150 cc bolus NS. Patient was sent to the ER without receiving chemotherapy.    Of note, he started noticing weight loss and abdominal distension since 2018. On 18, he was found to have 4 cm pancreatic mass and omental carcinomatosis with intra and extrahepatic ductal dilatation. Ascites and liver metastasis were also noted. ERCP was performed and biliary stent was inserted. He was started on chemotherapy on Gemzar and abraxane on . He received second dose of chemo on , and was due for 3rd dose on , which he did not receive. He is currently rescheduled his 3rd chemo for next Monday (). Large volume paracentesis was performed on  and 9/10 (~ 6L each time) prior to his 3rd paracentesis that was performed on the day of presentation ().      REVIEW OF SYSTEMS:  CONSTITUTIONAL: No fever  RESPIRATORY: No cough, wheezing, chills or hemoptysis; No shortness of breath  CARDIOVASCULAR: No chest pain, palpitations, dizziness  GASTROINTESTINAL: Abdominal discomfort; denies abdominal pain; decreased appetite and PO intake      MEDICATIONS  (STANDING):  amylase/lipase/protease  (CREON 12,000 Units) 1 Capsule(s) Oral three times a day  cefTRIAXone   IVPB 1 Gram(s) IV Intermittent every 24 hours  metoprolol tartrate 25 milliGRAM(s) Oral two times a day  pantoprazole    Tablet 40 milliGRAM(s) Oral before breakfast    MEDICATIONS  (PRN):        CAPILLARY BLOOD GLUCOSE        I&O's Summary    19 Sep 2018 07:01  -  20 Sep 2018 07:00  --------------------------------------------------------  IN: 0 mL / OUT: 400 mL / NET: -400 mL      Vital Signs Last 24 Hrs  T(C): 36.6 (20 Sep 2018 11:25), Max: 37.2 (19 Sep 2018 17:41)  T(F): 97.8 (20 Sep 2018 11:25), Max: 98.9 (19 Sep 2018 17:41)  HR: 117 (20 Sep 2018 11:) (107 - 131)  BP: 104/69 (20 Sep 2018 11:25) (100/69 - 114/79)  BP(mean): --  RR: 18 (20 Sep 2018 11:) (16 - 22)  SpO2: 92% (20 Sep 2018 11:) (92% - 100%)    PHYSICAL EXAM:  GENERAL: NAD, well-developed  HEENT: Atraumatic, Normocephalic; EOMI; jaundice noted in sclera and under the tongue  CHEST/LUNG: Mild inspiratory crackles right lower lung base; no wheezes  HEART: Regular rate and rhythm; No murmurs, rubs, or gallops  ABDOMEN: Firm, Nontender, distended; hypoactive BS  EXTREMITIES: 2+ pitting edema mid-shin bilaterally  PSYCH: AAOx3  NEUROLOGY: non-focal  SKIN: facial jaundice    LABS:                        9.7    15.33 )-----------( 240      ( 20 Sep 2018 07:44 )             29.0     09-20    135  |  102  |  11  ----------------------------<  108<H>  3.6   |  23  |  1.05    Ca    7.9<L>      20 Sep 2018 06:34  Phos  3.4     09-20  Mg     1.8     09-20    TPro  4.9<L>  /  Alb  2.4<L>  /  TBili  1.6<H>  /  DBili  x   /  AST  36  /  ALT  15  /  AlkPhos  291<H>  09-20    PT/INR - ( 19 Sep 2018 18:17 )   PT: 14.2 sec;   INR: 1.31 ratio         PTT - ( 19 Sep 2018 18:17 )  PTT:32.2 sec      Urinalysis Basic - ( 20 Sep 2018 05:33 )    Color: Yellow / Appearance: Clear / S.014 / pH: x  Gluc: x / Ketone: Negative  / Bili: Negative / Urobili: Negative   Blood: x / Protein: Trace / Nitrite: Negative   Leuk Esterase: Negative / RBC: 4 /hpf / WBC 2 /hpf   Sq Epi: x / Non Sq Epi: 0 /hpf / Bacteria: Negative        RADIOLOGY & ADDITIONAL TESTS:    Imaging Personally Reviewed:    Consultant(s) Notes Reviewed:      Care Discussed with Consultants/Other Providers:

## 2018-09-20 NOTE — PROGRESS NOTE ADULT - PROBLEM SELECTOR PLAN 4
Concerns for SBP given Leukocytosis and hypotension. However afebrile and non tender on abdominal exam.    - Will continue with Ceftriaxone IV.  - Diagnostic paracentesis to ascertain if SBP is present.

## 2018-09-20 NOTE — CHART NOTE - NSCHARTNOTEFT_GEN_A_CORE
Upon Nutritional Assessment by the Registered Dietitian your patient was determined to meet criteria / has evidence of the following diagnosis/diagnoses:          [ ]  Mild Protein Calorie Malnutrition        [ ]  Moderate Protein Calorie Malnutrition        [x] Severe Protein Calorie Malnutrition        [ ] Unspecified Protein Calorie Malnutrition        [ ] Underweight / BMI <19        [ ] Morbid Obesity / BMI > 40      Findings as based on:  [x] Comprehensive nutrition assessment   [x] Nutrition Focused Physical Exam: moderate muscle wasting, moderate fat loss  [x] Other: poor po intake >1 month, 10 lb. wt loss x 4-6 weeks      Nutrition Plan/Recommendations:    1. Continue to provide current diet order, no therapeutic diet restrictions indicated at this time.   2. Provide 1 Ensure Clear daily (provides 240kcal, 8g protein)   3. Provide TeachBoost Shake per pt preference  4. Will continue to monitor po intake, wt, labs, f/u per protocol    RD remains available. Katy Salazar RD, Pager: [phone]       PROVIDER Section:     By signing this assessment you are acknowledging and agree with the diagnosis/diagnoses assigned by the Registered Dietitian    Comments: Upon Nutritional Assessment by the Registered Dietitian your patient was determined to meet criteria / has evidence of the following diagnosis/diagnoses:          [ ]  Mild Protein Calorie Malnutrition        [ ]  Moderate Protein Calorie Malnutrition        [x] Severe Protein Calorie Malnutrition        [ ] Unspecified Protein Calorie Malnutrition        [ ] Underweight / BMI <19        [ ] Morbid Obesity / BMI > 40      Findings as based on:  [x] Comprehensive nutrition assessment: catabolic illness  [x] Nutrition Focused Physical Exam: moderate muscle wasting, moderate fat loss  [x] Other: poor po intake >1 month, 10 lb. wt loss x 4-6 weeks      Nutrition Plan/Recommendations:    1. Continue to provide current diet order, no therapeutic diet restrictions indicated at this time.   2. Provide 1 Ensure Clear daily (provides 240kcal, 8g protein)   3. Provide FreeWheel Shake per pt preference  4. Will continue to monitor po intake, wt, labs, f/u per protocol    RD remains available. Katy Salazar RD, Pager: [phone]      PROVIDER Section:     By signing this assessment you are acknowledging and agree with the diagnosis/diagnoses assigned by the Registered Dietitian    Comments:

## 2018-09-20 NOTE — H&P ADULT - PROBLEM SELECTOR PLAN 1
-Patient became hypotensive s/p 1L therapeutic paracentesis likely 2/2 intravascular fluid shift, although can't r/o infectious cause.  -S/p 1L NS and 100cc albumin, currently BP stabilized.   -May need additional albumin if patient became hypotensive.

## 2018-09-20 NOTE — DIETITIAN INITIAL EVALUATION ADULT. - PROBLEM SELECTOR PLAN 8
-On home Xarelto, hold pharmacological DVT ppx in anticipation for therapeutic LVP.   -SCDs.    -Nutrition c/s

## 2018-09-20 NOTE — PROGRESS NOTE ADULT - PROBLEM SELECTOR PLAN 1
- Will give Metoprolol tartrate 25 mg PO now and will reassess.   - Will contact his private Cardiologist Dr. Torres. - Will give Metoprolol tartrate 25 mg PO now and will reassess and up titrate to control rate giving BP can tolerate it.   - If continues to have hypotension will also consider Albumin infusion.   - Will contact his private Cardiologist Dr. Torres.  - Will get TTE to assess for presence of pericardial effusion (given low voltage in ECG and hypotension) - Will give Metoprolol tartrate 25 mg PO now and will reassess and up titrate to control rate giving BP can tolerate it.   - If continues to have hypotension will also consider Albumin infusion.   - Cardiology consulted, recommendations appreciated will do Digoxin for rate control and inotropic effect.   - Will get TTE to assess for presence of pericardial effusion (given low voltage in ECG and hypotension)

## 2018-09-21 ENCOUNTER — RESULT REVIEW (OUTPATIENT)
Age: 73
End: 2018-09-21

## 2018-09-21 DIAGNOSIS — A41.9 SEPSIS, UNSPECIFIED ORGANISM: ICD-10-CM

## 2018-09-21 LAB
ALBUMIN FLD-MCNC: 1 G/DL — SIGNIFICANT CHANGE UP
ALBUMIN SERPL ELPH-MCNC: 2.3 G/DL — LOW (ref 3.3–5)
ALP SERPL-CCNC: 300 U/L — HIGH (ref 40–120)
ALT FLD-CCNC: 17 U/L — SIGNIFICANT CHANGE UP (ref 10–45)
ANION GAP SERPL CALC-SCNC: 12 MMOL/L — SIGNIFICANT CHANGE UP (ref 5–17)
APTT BLD: 33.4 SEC — SIGNIFICANT CHANGE UP (ref 27.5–37.4)
AST SERPL-CCNC: 36 U/L — SIGNIFICANT CHANGE UP (ref 10–40)
B PERT IGG+IGM PNL SER: CLEAR — SIGNIFICANT CHANGE UP
BASOPHILS # BLD AUTO: 0 K/UL — SIGNIFICANT CHANGE UP (ref 0–0.2)
BASOPHILS NFR BLD AUTO: 0 % — SIGNIFICANT CHANGE UP (ref 0–2)
BILIRUB SERPL-MCNC: 1.4 MG/DL — HIGH (ref 0.2–1.2)
BUN SERPL-MCNC: 12 MG/DL — SIGNIFICANT CHANGE UP (ref 7–23)
CALCIUM SERPL-MCNC: 8 MG/DL — LOW (ref 8.4–10.5)
CHLORIDE SERPL-SCNC: 100 MMOL/L — SIGNIFICANT CHANGE UP (ref 96–108)
CO2 SERPL-SCNC: 21 MMOL/L — LOW (ref 22–31)
COLOR FLD: YELLOW — SIGNIFICANT CHANGE UP
CREAT SERPL-MCNC: 0.98 MG/DL — SIGNIFICANT CHANGE UP (ref 0.5–1.3)
CULTURE RESULTS: NO GROWTH — SIGNIFICANT CHANGE UP
EOSINOPHIL # BLD AUTO: 0.15 K/UL — SIGNIFICANT CHANGE UP (ref 0–0.5)
EOSINOPHIL NFR BLD AUTO: 0.8 % — SIGNIFICANT CHANGE UP (ref 0–6)
FLUID INTAKE SUBSTANCE CLASS: SIGNIFICANT CHANGE UP
FLUID SEGMENTED GRANULOCYTES: 11 % — SIGNIFICANT CHANGE UP
GLUCOSE FLD-MCNC: 162 MG/DL — SIGNIFICANT CHANGE UP
GLUCOSE SERPL-MCNC: 143 MG/DL — HIGH (ref 70–99)
GRAM STN FLD: SIGNIFICANT CHANGE UP
HCT VFR BLD CALC: 33.6 % — LOW (ref 39–50)
HGB BLD-MCNC: 10.9 G/DL — LOW (ref 13–17)
INR BLD: 1.35 RATIO — HIGH (ref 0.88–1.16)
LDH SERPL L TO P-CCNC: 126 U/L — SIGNIFICANT CHANGE UP
LYMPHOCYTES # BLD AUTO: 0.33 K/UL — LOW (ref 1–3.3)
LYMPHOCYTES # BLD AUTO: 1.7 % — LOW (ref 13–44)
LYMPHOCYTES # FLD: 34 % — SIGNIFICANT CHANGE UP
MCHC RBC-ENTMCNC: 31.5 PG — SIGNIFICANT CHANGE UP (ref 27–34)
MCHC RBC-ENTMCNC: 32.4 GM/DL — SIGNIFICANT CHANGE UP (ref 32–36)
MCV RBC AUTO: 97.1 FL — SIGNIFICANT CHANGE UP (ref 80–100)
MESOTHL CELL # FLD: 3 % — SIGNIFICANT CHANGE UP
MONOCYTES # BLD AUTO: 0.98 K/UL — HIGH (ref 0–0.9)
MONOCYTES NFR BLD AUTO: 5.1 % — SIGNIFICANT CHANGE UP (ref 2–14)
MONOS+MACROS # FLD: 41 % — SIGNIFICANT CHANGE UP
NEUTROPHILS # BLD AUTO: 17.3 K/UL — HIGH (ref 1.8–7.4)
NEUTROPHILS NFR BLD AUTO: 89.9 % — HIGH (ref 43–77)
OTHER CELLS FLD MANUAL: 11 % — SIGNIFICANT CHANGE UP
PLATELET # BLD AUTO: 228 K/UL — SIGNIFICANT CHANGE UP (ref 150–400)
POTASSIUM SERPL-MCNC: 3.7 MMOL/L — SIGNIFICANT CHANGE UP (ref 3.5–5.3)
POTASSIUM SERPL-SCNC: 3.7 MMOL/L — SIGNIFICANT CHANGE UP (ref 3.5–5.3)
PROT FLD-MCNC: 2 G/DL — SIGNIFICANT CHANGE UP
PROT SERPL-MCNC: 5.1 G/DL — LOW (ref 6–8.3)
PROTHROM AB SERPL-ACNC: 14.8 SEC — HIGH (ref 9.8–12.7)
RBC # BLD: 3.46 M/UL — LOW (ref 4.2–5.8)
RBC # FLD: 16.5 % — HIGH (ref 10.3–14.5)
RCV VOL RI: 80 /UL — HIGH (ref 0–5)
SODIUM SERPL-SCNC: 133 MMOL/L — LOW (ref 135–145)
SPECIMEN SOURCE FLD: SIGNIFICANT CHANGE UP
SPECIMEN SOURCE: SIGNIFICANT CHANGE UP
SPECIMEN SOURCE: SIGNIFICANT CHANGE UP
TOTAL NUCLEATED CELL COUNT, BODY FLUID: 153 /UL — HIGH (ref 0–5)
TUBE TYPE: SIGNIFICANT CHANGE UP
WBC # BLD: 19.24 K/UL — HIGH (ref 3.8–10.5)
WBC # FLD AUTO: 19.24 K/UL — HIGH (ref 3.8–10.5)

## 2018-09-21 PROCEDURE — 88108 CYTOPATH CONCENTRATE TECH: CPT | Mod: 26,59

## 2018-09-21 PROCEDURE — 99233 SBSQ HOSP IP/OBS HIGH 50: CPT | Mod: GC

## 2018-09-21 PROCEDURE — 99232 SBSQ HOSP IP/OBS MODERATE 35: CPT

## 2018-09-21 PROCEDURE — 88342 IMHCHEM/IMCYTCHM 1ST ANTB: CPT | Mod: 26

## 2018-09-21 PROCEDURE — 88305 TISSUE EXAM BY PATHOLOGIST: CPT | Mod: 26

## 2018-09-21 PROCEDURE — 88341 IMHCHEM/IMCYTCHM EA ADD ANTB: CPT | Mod: 26

## 2018-09-21 PROCEDURE — 88112 CYTOPATH CELL ENHANCE TECH: CPT | Mod: 26

## 2018-09-21 PROCEDURE — 49083 ABD PARACENTESIS W/IMAGING: CPT

## 2018-09-21 RX ORDER — ALBUMIN HUMAN 25 %
100 VIAL (ML) INTRAVENOUS ONCE
Qty: 0 | Refills: 0 | Status: COMPLETED | OUTPATIENT
Start: 2018-09-21 | End: 2018-09-21

## 2018-09-21 RX ORDER — RIVAROXABAN 15 MG-20MG
20 KIT ORAL AT BEDTIME
Qty: 0 | Refills: 0 | Status: DISCONTINUED | OUTPATIENT
Start: 2018-09-21 | End: 2018-09-22

## 2018-09-21 RX ADMIN — Medication 1 CAPSULE(S): at 17:29

## 2018-09-21 RX ADMIN — PANTOPRAZOLE SODIUM 40 MILLIGRAM(S): 20 TABLET, DELAYED RELEASE ORAL at 05:33

## 2018-09-21 RX ADMIN — CEFTRIAXONE 100 GRAM(S): 500 INJECTION, POWDER, FOR SOLUTION INTRAMUSCULAR; INTRAVENOUS at 11:13

## 2018-09-21 RX ADMIN — RIVAROXABAN 20 MILLIGRAM(S): KIT at 22:09

## 2018-09-21 RX ADMIN — Medication 50 MILLILITER(S): at 19:46

## 2018-09-21 RX ADMIN — Medication 1 CAPSULE(S): at 05:34

## 2018-09-21 RX ADMIN — Medication 25 MILLIGRAM(S): at 06:32

## 2018-09-21 RX ADMIN — Medication 1 CAPSULE(S): at 22:09

## 2018-09-21 RX ADMIN — Medication 0.12 MILLIGRAM(S): at 05:34

## 2018-09-21 RX ADMIN — Medication 25 MILLIGRAM(S): at 22:09

## 2018-09-21 NOTE — PROGRESS NOTE ADULT - SUBJECTIVE AND OBJECTIVE BOX
SUBJ:  Feeling tired and overall discomfort, appears ill, jaundices, generalized pain. Started Digoxin with improved rate control 100-120. Denies chest pain and shortness of breath. Positive abdominal discomfort.     MEDICATIONS  (STANDING):  amylase/lipase/protease  (CREON 12,000 Units) 1 Capsule(s) Oral three times a day  cefTRIAXone   IVPB 1 Gram(s) IV Intermittent every 24 hours  digoxin     Tablet 0.125 milliGRAM(s) Oral daily  metoprolol tartrate 25 milliGRAM(s) Oral two times a day  pantoprazole    Tablet 40 milliGRAM(s) Oral before breakfast    Vital Signs Last 24 Hrs  T(C): 36.6 (21 Sep 2018 08:30), Max: 37.2 (20 Sep 2018 20:23)  T(F): 97.9 (21 Sep 2018 08:30), Max: 98.9 (20 Sep 2018 20:23)  HR: 110 (21 Sep 2018 08:30) (98 - 128)  BP: 93/63 (21 Sep 2018 08:30) (93/63 - 108/75)  BP(mean): --  RR: 18 (21 Sep 2018 08:30) (16 - 18)  SpO2: 94% (21 Sep 2018 08:30) (90% - 97%)    REVIEW OF SYSTEMS:  As per HPI, otherwise unremarkable.     PHYSICAL EXAM:  Appearance: Ill appearing, cachectic   HEENT:  Normal oral mucosa  Lymphatic: No lymphadenopathy  Cardiovascular: irreg, tachycardic, No murmurs, No edema  Respiratory: Reduced breath sounds.   Psychiatry: A & O x 3  Gastrointestinal: Positive abdominal discomfort.   Skin: No rashes, No ecchymoses, No cyanosis	  Neurologic: Non-focal  Extremities:No clubbing, cyanosis or edema    LABS:   CBC Full  -  ( 21 Sep 2018 07:55 )  WBC Count : 19.24 K/uL  Hemoglobin : 10.9 g/dL  Hematocrit : 33.6 %  Platelet Count - Automated : 228 K/uL  Mean Cell Volume : 97.1 fl  Mean Cell Hemoglobin : 31.5 pg  Mean Cell Hemoglobin Concentration : 32.4 gm/dL  Auto Neutrophil # : 17.30 K/uL  Auto Lymphocyte # : 0.33 K/uL  Auto Monocyte # : 0.98 K/uL  Auto Eosinophil # : 0.15 K/uL  Auto Basophil # : 0.00 K/uL  Auto Neutrophil % : 89.9 %  Auto Lymphocyte % : 1.7 %  Auto Monocyte % : 5.1 %  Auto Eosinophil % : 0.8 %  Auto Basophil % : 0.0 %    09-21    133<L>  |  100  |  12  ----------------------------<  143<H>  3.7   |  21<L>  |  0.98    Ca    8.0<L>      21 Sep 2018 07:21  Phos  3.0     09-21  Mg     1.8     09-21    TPro  5.1<L>  /  Alb  2.3<L>  /  TBili  1.4<H>  /  DBili  x   /  AST  36  /  ALT  17  /  AlkPhos  300<H>  09-21    ECHO 9/20/2018  Conclusions:  1. Normal mitral valve. Mild mitral regurgitation.  2. Normal trileaflet aortic valve.  3. Increased relative wall thickness with normal left  ventricular mass index, consistent with concentric left  ventricular remodeling.  4. Mild segmental left ventricular systolic dysfunction.  The basal septum appears hypokinetic. Tachycardia and  variable RR interval limit LV assessment.  5. Normal right ventricular size and function.  6. Estimated right ventricular systolic pressure equals 35  mm Hg, assuming right atrial pressure equals 8 mm Hg,  consistent with borderline pulmonary hypertension.  7. Normal pericardium with trace pericardial effusion.  8. Left pleural effusion.  *** No previous Echo exam.    IMPRESSION AND PLAN:  73y Male patient with past medical history of metastatic pancreatic adenocarcinoma s/p biliary stent (mets to liver, started Gemzar/abraxane 8/29/18), afib on xarelto, HTN presents with plans for paracentesis complicated by atrial fibrillation with RVR.    1) Atrial Fibrillation RVR  Rates improved to 100-120 with medical management.   Asymptomatic   Low/normal BP    ·	Continue rate control with metoprolol tartrate 25 mg BID and up-titrate as tolerated.   ·	Holding A/C due to pending paracentesis but resume when possible.   ·	Continue digoxin load 500 mcg once and  mcg daily thereafter  ·	Continue treating underlying medical problems     Please call with questions. Discharge on above medical regimen.     Bi Pat MD, MPH, [place]  Cardiovascular Specialist Attending  [facility]  C: [phone]  E: [email or web address]  (Cardiology nocturnist available every night 7 pm - 7 am; available week days for follow-up; general cardiology consult coverage weekend days)

## 2018-09-21 NOTE — PROGRESS NOTE ADULT - SUBJECTIVE AND OBJECTIVE BOX
Dr. Rashmi Mauro  Internal Medicine PGY-1   Pager# [phone]    Patient is a 73y old  Male who presents with a chief complaint of Hypotension (20 Sep 2018 15:45)      SUBJECTIVE / OVERNIGHT EVENTS:    MEDICATIONS  (STANDING):  amylase/lipase/protease  (CREON 12,000 Units) 1 Capsule(s) Oral three times a day  cefTRIAXone   IVPB 1 Gram(s) IV Intermittent every 24 hours  digoxin     Tablet 0.125 milliGRAM(s) Oral daily  metoprolol tartrate 25 milliGRAM(s) Oral two times a day  pantoprazole    Tablet 40 milliGRAM(s) Oral before breakfast    MEDICATIONS  (PRN):      Vital Signs Last 24 Hrs  T(C): 36.6 (21 Sep 2018 04:35), Max: 37.2 (20 Sep 2018 20:23)  T(F): 97.9 (21 Sep 2018 04:35), Max: 98.9 (20 Sep 2018 20:23)  HR: 126 (21 Sep 2018 06:30) (98 - 128)  BP: 104/68 (21 Sep 2018 06:30) (97/65 - 108/75)  BP(mean): --  RR: 16 (21 Sep 2018 00:41) (16 - 18)  SpO2: 90% (21 Sep 2018 04:35) (90% - 97%)  CAPILLARY BLOOD GLUCOSE        I&O's Summary    19 Sep 2018 07:01  -  20 Sep 2018 07:00  --------------------------------------------------------  IN: 0 mL / OUT: 400 mL / NET: -400 mL    20 Sep 2018 07:01  -  21 Sep 2018 06:40  --------------------------------------------------------  IN: 600 mL / OUT: 900 mL / NET: -300 mL        PHYSICAL EXAM:  GENERAL: NAD, well-developed  HEAD:  Atraumatic, Normocephalic  EYES: EOMI, PERRLA, conjunctiva and sclera clear  NECK: Supple, No JVD  CHEST/LUNG: Clear to auscultation bilaterally; No wheeze  HEART: Regular rate and rhythm; No murmurs, rubs, or gallops  ABDOMEN: Soft, Nontender, Nondistended; Bowel sounds present  EXTREMITIES:  2+ Peripheral Pulses, No clubbing, cyanosis, or edema  PSYCH: AAOx3  NEUROLOGY: non-focal  SKIN: No rashes or lesions Dr. Rashmi Mauro  Internal Medicine PGY-1   Pager# [phone]    Patient is a 73y old  Male who presents with a chief complaint of Hypotension (20 Sep 2018 15:45)      SUBJECTIVE / OVERNIGHT EVENTS:    MEDICATIONS  (STANDING):  amylase/lipase/protease  (CREON 12,000 Units) 1 Capsule(s) Oral three times a day  cefTRIAXone   IVPB 1 Gram(s) IV Intermittent every 24 hours  digoxin     Tablet 0.125 milliGRAM(s) Oral daily  metoprolol tartrate 25 milliGRAM(s) Oral two times a day  pantoprazole    Tablet 40 milliGRAM(s) Oral before breakfast    MEDICATIONS  (PRN):      Vital Signs Last 24 Hrs  T(C): 36.6 (21 Sep 2018 04:35), Max: 37.2 (20 Sep 2018 20:23)  T(F): 97.9 (21 Sep 2018 04:35), Max: 98.9 (20 Sep 2018 20:23)  HR: 126 (21 Sep 2018 06:30) (98 - 128)  BP: 104/68 (21 Sep 2018 06:30) (97/65 - 108/75)  BP(mean): --  RR: 16 (21 Sep 2018 00:41) (16 - 18)  SpO2: 90% (21 Sep 2018 04:35) (90% - 97%)  CAPILLARY BLOOD GLUCOSE        I&O's Summary    19 Sep 2018 07:01  -  20 Sep 2018 07:00  --------------------------------------------------------  IN: 0 mL / OUT: 400 mL / NET: -400 mL    20 Sep 2018 07:01  -  21 Sep 2018 06:40  --------------------------------------------------------  IN: 600 mL / OUT: 900 mL / NET: -300 mL        PHYSICAL EXAM:  GENERAL: NAD, well-developed  HEAD:  Atraumatic, Normocephalic  EYES: EOMI, PERRLA, conjunctiva and sclera clear  NECK: Supple, No JVD  CHEST/LUNG: Clear to auscultation bilaterally; No wheeze  HEART: Regular rate and rhythm; No murmurs, rubs, or gallops  ABDOMEN: Soft, Nontender, Nondistended; Bowel sounds present  EXTREMITIES:  2+ Peripheral Pulses, No clubbing, cyanosis, or edema  PSYCH: AAOx3  NEUROLOGY: non-focal  SKIN: No rashes or lesions      < from: Transthoracic Echocardiogram (09.20.18 @ 14:16) >  Dimensions:    Normal Values:  LA:     3.9    2.0 - 4.0 cm  Ao:     3.3    2.0 - 3.8 cm  SEPTUM: 1.1    0.6 - 1.2 cm  PWT:    1.1    0.6 - 1.1 cm  LVIDd:  3.3    3.0 - 5.6 cm  LVIDs:  2.6    1.8 - 4.0 cm  Derived variables:  LVMI: 55 g/m2  RWT: 0.66  Fractional short: 21 %  EF (Visual Estimate): 45-50 %  ------------------------------------------------------------------------  Observations:  Mitral Valve: Normal mitral valve. Mild mitral  regurgitation.  Aortic Valve/Aorta: Normal trileaflet aortic valve.  Aortic Root: 3.3 cm.  Left Atrium: Normal left atrium.  Left Ventricle: Mild segmental left ventricular systolic  dysfunction. The basal septum appears hypokinetic.  Tachycardia and variable RR interval limit LV assessment.  Increased relative wall thickness with normal left  ventricular mass index, consistent with concentric left  ventricular remodeling.  Right Heart: Normal rightatrium. Normal right ventricular  size and function. Normal tricuspid valve. Mild tricuspid  regurgitation. Normal pulmonic valve. Mild pulmonic  regurgitation.  Pericardium/Pleura: Normal pericardium with trace  pericardial effusion.  Left pleural effusion.  Hemodynamic: Estimated right atrial pressure is 8 mm Hg.  Estimated right ventricular systolic pressure equals 35 mm  Hg, assuming right atrial pressure equals 8 mm Hg,  consistent with borderline pulmonary hypertension.  ------------------------------------------------------------------------  Conclusions:  1. Normal mitral valve. Mild mitral regurgitation.  2. Normal trileaflet aortic valve.  3. Increased relative wall thickness with normal left  ventricular mass index, consistent withconcentric left  ventricular remodeling.  4. Mild segmental left ventricular systolic dysfunction.  The basal septum appears hypokinetic. Tachycardia and  variable RR interval limit LV assessment.  5. Normal right ventricular size and function.  6. Estimated right ventricular systolic pressure equals 35  mm Hg, assuming right atrial pressure equals 8 mm Hg,  consistent with borderline pulmonary hypertension.  7. Normal pericardium with trace pericardial effusion.  8. Left pleural effusion.  *** No previous Echo exam.  ------------------------------------------------------------------------  Confirmed on  9/20/2018 - 18:08:17 by Prasad Britt MD  ------------------------------------------------------------------------    < end of copied text > Dr. Rashmi Mauro  Internal Medicine PGY-1   Pager# [phone]    Patient is a 73y old  Male who presents with a chief complaint of Hypotension (20 Sep 2018 15:45)      SUBJECTIVE / OVERNIGHT EVENTS: No acute events overnight. Patient does report distention causing difficulty breathing, however no increase SOB. No chest pain, nausea, vomiting, calf pain or abdominal pain. Has somewhat of an appetite, drinking ensure.     Telemetry: Atrial Fibrillation with RVR (100-120 consistently through the night). Briefly up to 170's for (seconds).     MEDICATIONS  (STANDING):  amylase/lipase/protease  (CREON 12,000 Units) 1 Capsule(s) Oral three times a day  cefTRIAXone   IVPB 1 Gram(s) IV Intermittent every 24 hours  digoxin     Tablet 0.125 milliGRAM(s) Oral daily  metoprolol tartrate 25 milliGRAM(s) Oral two times a day  pantoprazole    Tablet 40 milliGRAM(s) Oral before breakfast    MEDICATIONS  (PRN):      Vital Signs Last 24 Hrs  T(C): 36.6 (21 Sep 2018 04:35), Max: 37.2 (20 Sep 2018 20:23)  T(F): 97.9 (21 Sep 2018 04:35), Max: 98.9 (20 Sep 2018 20:23)  HR: 126 (21 Sep 2018 06:30) (98 - 128)  BP: 104/68 (21 Sep 2018 06:30) (97/65 - 108/75)  BP(mean): --  RR: 16 (21 Sep 2018 00:41) (16 - 18)  SpO2: 90% (21 Sep 2018 04:35) (90% - 97%)  CAPILLARY BLOOD GLUCOSE        I&O's Summary    19 Sep 2018 07:01  -  20 Sep 2018 07:00  --------------------------------------------------------  IN: 0 mL / OUT: 400 mL / NET: -400 mL    20 Sep 2018 07:01  -  21 Sep 2018 06:40  --------------------------------------------------------  IN: 600 mL / OUT: 900 mL / NET: -300 mL        PHYSICAL EXAM:  GENERAL: NAD, well-developed  HEAD:  Atraumatic, Normocephalic  EYES: EOMI, PERRLA, conjunctiva and sclera clear  NECK: Supple, No JVD  CHEST/LUNG: Clear to auscultation bilaterally; No wheeze  HEART: Regular rate and rhythm; No murmurs, rubs, or gallops  ABDOMEN: Soft, Nontender, Nondistended; Bowel sounds present  EXTREMITIES:  2+ Peripheral Pulses, No clubbing, cyanosis, or edema  PSYCH: AAOx3  NEUROLOGY: non-focal  SKIN: No rashes or lesions      < from: Transthoracic Echocardiogram (18 @ 14:16) >  Dimensions:    Normal Values:  LA:     3.9    2.0 - 4.0 cm  Ao:     3.3    2.0 - 3.8 cm  SEPTUM: 1.1    0.6 - 1.2 cm  PWT:    1.1    0.6 - 1.1 cm  LVIDd:  3.3    3.0 - 5.6 cm  LVIDs:  2.6    1.8 - 4.0 cm  Derived variables:  LVMI: 55 g/m2  RWT: 0.66  Fractional short: 21 %  EF (Visual Estimate): 45-50 %  ------------------------------------------------------------------------  Observations:  Mitral Valve: Normal mitral valve. Mild mitral  regurgitation.  Aortic Valve/Aorta: Normal trileaflet aortic valve.  Aortic Root: 3.3 cm.  Left Atrium: Normal left atrium.  Left Ventricle: Mild segmental left ventricular systolic  dysfunction. The basal septum appears hypokinetic.  Tachycardia and variable RR interval limit LV assessment.  Increased relative wall thickness with normal left  ventricular mass index, consistent with concentric left  ventricular remodeling.  Right Heart: Normal rightatrium. Normal right ventricular  size and function. Normal tricuspid valve. Mild tricuspid  regurgitation. Normal pulmonic valve. Mild pulmonic  regurgitation.  Pericardium/Pleura: Normal pericardium with trace  pericardial effusion.  Left pleural effusion.  Hemodynamic: Estimated right atrial pressure is 8 mm Hg.  Estimated right ventricular systolic pressure equals 35 mm  Hg, assuming right atrial pressure equals 8 mm Hg,  consistent with borderline pulmonary hypertension.  ------------------------------------------------------------------------  Conclusions:  1. Normal mitral valve. Mild mitral regurgitation.  2. Normal trileaflet aortic valve.  3. Increased relative wall thickness with normal left  ventricular mass index, consistent withconcentric left  ventricular remodeling.  4. Mild segmental left ventricular systolic dysfunction.  The basal septum appears hypokinetic. Tachycardia and  variable RR interval limit LV assessment.  5. Normal right ventricular size and function.  6. Estimated right ventricular systolic pressure equals 35  mm Hg, assuming right atrial pressure equals 8 mm Hg,  consistent with borderline pulmonary hypertension.  7. Normal pericardium with trace pericardial effusion.  8. Left pleural effusion.  *** No previous Echo exam.  ------------------------------------------------------------------------  Confirmed on  2018 - 18:08:17 by Prasad Britt MD  ------------------------------------------------------------------------    < end of copied text >      LABS:                        10.9   19.24 )-----------( 228      ( 21 Sep 2018 07:55 )             33.6     -    133<L>  |  100  |  12  ----------------------------<  143<H>  3.7   |  21<L>  |  0.98    Ca    8.0<L>      21 Sep 2018 07:21  Phos  3.0       Mg     1.8         TPro  5.1<L>  /  Alb  2.3<L>  /  TBili  1.4<H>  /  DBili  x   /  AST  36  /  ALT  17  /  AlkPhos  300<H>      PT/INR - ( 21 Sep 2018 07:03 )   PT: 14.8 sec;   INR: 1.35 ratio         PTT - ( 21 Sep 2018 07:03 )  PTT:33.4 sec      Urinalysis Basic - ( 20 Sep 2018 05:33 )    Color: Yellow / Appearance: Clear / S.014 / pH: x  Gluc: x / Ketone: Negative  / Bili: Negative / Urobili: Negative   Blood: x / Protein: Trace / Nitrite: Negative   Leuk Esterase: Negative / RBC: 4 /hpf / WBC 2 /hpf   Sq Epi: x / Non Sq Epi: 0 /hpf / Bacteria: Negative Dr. Rashmi Mauro  Internal Medicine PGY-1   Pager# [phone]    Patient is a 73y old  Male who presents with a chief complaint of Hypotension (20 Sep 2018 15:45)    SUBJECTIVE / OVERNIGHT EVENTS: No acute events overnight. Patient does report distention causing difficulty breathing, however no increase SOB. No chest pain, nausea, vomiting, calf pain or abdominal pain. Has somewhat of an appetite, drinking ensure.     Telemetry: Atrial Fibrillation with RVR (100-120 consistently through the night). Briefly up to 170's for (seconds).     MEDICATIONS  (STANDING):  amylase/lipase/protease  (CREON 12,000 Units) 1 Capsule(s) Oral three times a day  cefTRIAXone   IVPB 1 Gram(s) IV Intermittent every 24 hours  digoxin     Tablet 0.125 milliGRAM(s) Oral daily  metoprolol tartrate 25 milliGRAM(s) Oral two times a day  pantoprazole    Tablet 40 milliGRAM(s) Oral before breakfast    Vital Signs Last 24 Hrs  T(F): 97.9 (21 Sep 2018 04:35), Max: 98.9 (20 Sep 2018 20:23)  HR: 126 (21 Sep 2018 06:30) (98 - 128)  BP: 104/68 (21 Sep 2018 06:30) (97/65 - 108/75)  RR: 16 (21 Sep 2018 00:41) (16 - 18)  SpO2: 90% (21 Sep 2018 04:35) (90% - 97%)    I&O's Summary  19 Sep 2018 07:01  -  20 Sep 2018 07:00  --------------------------------------------------------  IN: 0 mL / OUT: 400 mL / NET: -400 mL    20 Sep 2018 07:01  -  21 Sep 2018 06:40  --------------------------------------------------------  IN: 600 mL / OUT: 900 mL / NET: -300 mL    PHYSICAL EXAM:  GENERAL: NAD, well-developed  HEAD:  Atraumatic, Normocephalic  EYES: EOMI, PERRLA, conjunctiva and sclera clear  NECK: Supple, No JVD  CHEST/LUNG: Clear to auscultation bilaterally; No wheeze  HEART: Regular rate and rhythm; No murmurs, rubs, or gallops  ABDOMEN: Soft, Nontender, Nondistended; Bowel sounds present  EXTREMITIES:  2+ Peripheral Pulses, No clubbing, cyanosis, or edema  PSYCH: AAOx3  NEUROLOGY: non-focal  SKIN: No rashes or lesions    < from: Transthoracic Echocardiogram (18 @ 14:16) >  Dimensions:    Normal Values:  LA:     3.9    2.0 - 4.0 cm  Ao:     3.3    2.0 - 3.8 cm  SEPTUM: 1.1    0.6 - 1.2 cm  PWT:    1.1    0.6 - 1.1 cm  LVIDd:  3.3    3.0 - 5.6 cm  LVIDs:  2.6    1.8 - 4.0 cm  Derived variables:  LVMI: 55 g/m2  RWT: 0.66  Fractional short: 21 %  EF (Visual Estimate): 45-50 %  ------------------------------------------------------------------------  Observations:  Mitral Valve: Normal mitral valve. Mild mitral  regurgitation.  Aortic Valve/Aorta: Normal trileaflet aortic valve.  Aortic Root: 3.3 cm.  Left Atrium: Normal left atrium.  Left Ventricle: Mild segmental left ventricular systolic  dysfunction. The basal septum appears hypokinetic.  Tachycardia and variable RR interval limit LV assessment.  Increased relative wall thickness with normal left  ventricular mass index, consistent with concentric left  ventricular remodeling.  Right Heart: Normal rightatrium. Normal right ventricular  size and function. Normal tricuspid valve. Mild tricuspid  regurgitation. Normal pulmonic valve. Mild pulmonic  regurgitation.  Pericardium/Pleura: Normal pericardium with trace  pericardial effusion.  Left pleural effusion.  Hemodynamic: Estimated right atrial pressure is 8 mm Hg.  Estimated right ventricular systolic pressure equals 35 mm  Hg, assuming right atrial pressure equals 8 mm Hg,  consistent with borderline pulmonary hypertension.  ------------------------------------------------------------------------  Conclusions:  1. Normal mitral valve. Mild mitral regurgitation.  2. Normal trileaflet aortic valve.  3. Increased relative wall thickness with normal left  ventricular mass index, consistent withconcentric left  ventricular remodeling.  4. Mild segmental left ventricular systolic dysfunction.  The basal septum appears hypokinetic. Tachycardia and  variable RR interval limit LV assessment.  5. Normal right ventricular size and function.  6. Estimated right ventricular systolic pressure equals 35  mm Hg, assuming right atrial pressure equals 8 mm Hg,  consistent with borderline pulmonary hypertension.  7. Normal pericardium with trace pericardial effusion.  8. Left pleural effusion.  *** No previous Echo exam.  ------------------------------------------------------------------------  Confirmed on  2018 - 18:08:17 by Prasad Britt MD  ------------------------------------------------------------------------    < end of copied text >      LABS:                        10.9   19.24 )-----------( 228      ( 21 Sep 2018 07:55 )             33.6     09-    133<L>  |  100  |  12  ----------------------------<  143<H>  3.7   |  21<L>  |  0.98    Ca    8.0<L>      21 Sep 2018 07:21  Phos  3.0     -  Mg     1.8     -    TPro  5.1<L>  /  Alb  2.3<L>  /  TBili  1.4<H>  /  DBili  x   /  AST  36  /  ALT  17  /  AlkPhos  300<H>  09-21    PT/INR - ( 21 Sep 2018 07:03 )   PT: 14.8 sec;   INR: 1.35 ratio         PTT - ( 21 Sep 2018 07:03 )  PTT:33.4 sec      Urinalysis Basic - ( 20 Sep 2018 05:33 )    Color: Yellow / Appearance: Clear / S.014 / pH: x  Gluc: x / Ketone: Negative  / Bili: Negative / Urobili: Negative   Blood: x / Protein: Trace / Nitrite: Negative   Leuk Esterase: Negative / RBC: 4 /hpf / WBC 2 /hpf   Sq Epi: x / Non Sq Epi: 0 /hpf / Bacteria: Negative

## 2018-09-21 NOTE — PROGRESS NOTE ADULT - PROBLEM SELECTOR PLAN 1
- Will give Metoprolol tartrate 25 mg PO now and will reassess and up titrate to control rate giving BP can tolerate it.   - If continues to have hypotension will also consider Albumin infusion.   - Cardiology consulted, recommendations appreciated will do Digoxin for rate control and inotropic effect.   - Will get TTE to assess for presence of pericardial effusion (given low voltage in ECG and hypotension) - Will give Metoprolol tartrate 25 mg PO now and will reassess and up titrate to control rate giving BP can tolerate it.   - If continues to have hypotension will also consider Albumin infusion.   - Cardiology consulted, recommendations appreciated will do Digoxin for rate control and inotropic effect.   - TTE is negative for any effusion.

## 2018-09-21 NOTE — PROGRESS NOTE ADULT - PROBLEM SELECTOR PLAN 2
- Oncology consulted.  - Will have GOC discussion.  - Plan for diagnostic paracentesis for possible SBP.  - IR consulted obtained ultrasound abdomen imaging, will keep INR<2 and Platelets >50. - Oncology consulted.  - Will have GOC discussion.  - IR consulted obtained ultrasound abdomen imaging, will keep INR<2 and Platelets >50. - Oncology consulted.  - IR consulted obtained ultrasound abdomen imaging, will keep INR<2 and Platelets >50.  - IR paracentesis today

## 2018-09-21 NOTE — PROGRESS NOTE ADULT - SUBJECTIVE AND OBJECTIVE BOX
Interventional Radiology Pre-Procedure Note    This is a 73y Male with PMH of metastatic pancreatic adenocarcinoma s/p biliary stent (mets to liver, s/p Gemzar/abraxane 9/5/18, c/b portal vein thrombosis resulting in malignant recurrent ascites Afib on xarelto (held for procedure), HTN who presented from [facility] with hypotension after removing 1L from therapeutic paracentesis. Pt now being referred to IR for dx/ tx paracentesis. US 9/20 shows mod-lrg ascites.     PAST MEDICAL & SURGICAL HISTORY:  Pancreatic cancer: metastatic to liver  HTN (hypertension)  Afib  History of coronary artery stent placement  Carpal tunnel syndrome  S/P abdominal paracentesis: aug 30, 2018  H/O knee surgery     Vital Signs Last 24 Hrs  T(C): 36.6 (21 Sep 2018 08:30), Max: 37.2 (20 Sep 2018 20:23)  T(F): 97.9 (21 Sep 2018 08:30), Max: 98.9 (20 Sep 2018 20:23)  HR: 110 (21 Sep 2018 08:30) (98 - 128)  BP: 93/63 (21 Sep 2018 08:30) (93/63 - 108/75)  RR: 18 (21 Sep 2018 08:30) (16 - 18)  SpO2: 94% (21 Sep 2018 08:30) (90% - 97%)    Allergies: No Known Allergies    Physical Exam: Gen: NAD, A&Ox3  Abd: distended    Labs:                         10.9   19.24 )-----------( 228      ( 21 Sep 2018 07:55 )             33.6     09-21    133<L>  |  100  |  12  ----------------------------<  143<H>  3.7   |  21<L>  |  0.98    Ca    8.0<L>      21 Sep 2018 07:21  Phos  3.0     09-21  Mg     1.8     09-21    TPro  5.1<L>  /  Alb  2.3<L>  /  TBili  1.4<H>  /  DBili  x   /  AST  36  /  ALT  17  /  AlkPhos  300<H>  09-21    PT/INR - ( 21 Sep 2018 07:03 )   PT: 14.8 sec;   INR: 1.35 ratio         PTT - ( 21 Sep 2018 07:03 )  PTT:33.4 sec    Plan is for dx/tx paracentesis. The full procedure/ risks/ benefits/ alternatives were discussed with the pt and Informed consent obtained. All questions and concerns have been addressed at this time.

## 2018-09-21 NOTE — PROGRESS NOTE ADULT - PROBLEM SELECTOR PLAN 3
Most likely secondary to hypotension due A-fib RVR during paracentesis.     - Will continue to monitor BUN/Cr. Most likely secondary to hypotension due A-fib RVR during paracentesis. This is a mild NICKI (1.16 from 0.8). Now improving.     - Will continue to monitor BUN/Cr.

## 2018-09-21 NOTE — CHART NOTE - NSCHARTNOTEFT_GEN_A_CORE
Pt seen on 6T[facility] for initial malnutrition follow up.    Chart reviewed, events noted. Pt is 73yoM admitted for hypotension s/p therapeutic paracentesis at clinic. Pt with recently diagnosed metastatic pancreatic adenocarcinoma on chemo, s/p biliary stent, with ascites. Plan for paracentesis today 9/21. PMH includes Afib, holding A/C currently given pending paracentesis. Pt with hyponatremia noted 9/21, likely in setting of ascites. Continue to monitor. Pt likely septic, unclear source at this time per chart. GOC discussion pending.      Source: Patient [x]    Family [x] wife at bedside     other [x] electronic medical record    Diet : Regular diet + 1 Ensure Clear daily (provides 240kcal, 8g protein) + 1 Might[employer] Shake      Patient reports [ ] nausea  [ ] vomiting [ ] diarrhea [ ] constipation  [ ]chewing problems [ ] swallowing issues  [x] other: No symptoms of acute GI distress per pt and wife. However, pt with loose stools yesterday, improved today.      PO intake:  < 50% [ ] 50-75% [x]   % [ ]  other : Pt ate chocolate Nestle protein shake brought from home this AM (wife notes ~23g pro/shake) and 50% of lunch. Pt did not try Ensure clear or Mighty Health Shake yet, but will try later today or tomorrow- they are at bedside when pt wants it. Pt declined providing additional food preferences at this time. Still c/o feeling full and low appetite, hoping to eat more after paracentesis. Pt and family made aware RD remains available.     Source for PO intake [x] Patient [x] family [ ] chart [ ] staff [ ] other     Enteral /Parenteral Nutrition: n/a      Current Weight: no new wt to assess  09-20 189 lbs (standing)  % Weight Change    Pertinent Medications: MEDICATIONS  (STANDING):  amylase/lipase/protease  (CREON 12,000 Units) 1 Capsule(s) Oral three times a day  cefTRIAXone   IVPB 1 Gram(s) IV Intermittent every 24 hours  digoxin     Tablet 0.125 milliGRAM(s) Oral daily  metoprolol tartrate 25 milliGRAM(s) Oral two times a day  pantoprazole    Tablet 40 milliGRAM(s) Oral before breakfast    MEDICATIONS  (PRN):    Pertinent Labs:  09-21 Na133 mmol/L<L> Glu 143 mg/dL<H> K+ 3.7 mmol/L Cr  0.98 mg/dL BUN 12 mg/dL 09-21 Phos 3.0 mg/dL 09-21 Alb 2.3 g/dL<L>      Skin: No change since initial, no pressure injuries noted in chart.  Pt with ascites noted.    Estimated Needs:   [x] no change since previous assessment  [ ] recalculated:       Previous Nutrition Diagnosis:   [ ] Malnutrition, severe    [ ] Inadequate Energy Intake [ ]Inadequate Oral Intake [ ] Excessive Energy Intake     [ ] Underweight [ ] Increased Nutrient Needs [ ] Overweight/Obesity     [ ] Altered GI Function [ ] Unintended Weight Loss [ ] Food & Nutrition Related Knowledge Deficit          Nutrition Diagnosis is [x] ongoing  [ ] resolved [ ] not applicable          New Nutrition Diagnosis: [x] not applicable    [ ] Inadequate Protein Energy Intake [ ]Inadequate Oral Intake [ ] Excessive Energy Intake     [ ] Underweight [ ] Increased Nutrient Needs [ ] Overweight/Obesity     [ ] Altered GI Function [ ] Unintended Weight Loss [ ] Food & Nutrition Related Knowledge Deficit[ ] Limited Adherence to nutrition related recommendations [ ] Malnutrition  [ ] other: Free text       Related to:      As evidenced by:      Interventions:     Recommend  1. Continue to provide current diet order, no therapeutic diet restrictions indicated at this time.   2. Continue to provide 1 Ensure Clear daily (provides 240kcal, 8g protein) + 1 Might[employer] Shake  3. Monitor for nutrition supplementation acceptance  4. Continue to obtain and provide pt food preferences as feasible       Monitoring and Evaluation:     [x] PO intake [x] Tolerance to diet prescription [x] weights [x] follow up per protocol    [x] other: RD remains available. Katy Salazar RD, Pager: [phone] Pt seen on 6[facility] for initial malnutrition follow up.    Chart reviewed, events noted. Pt is 73yoM admitted for hypotension s/p therapeutic paracentesis at clinic. Pt with recently diagnosed metastatic pancreatic adenocarcinoma on chemo, s/p biliary stent, with ascites. Plan for paracentesis today 9/21. PMH includes Afib, holding A/C currently given pending paracentesis. Pt with hyponatremia noted 9/21, likely in setting of ascites. Continue to monitor. Pt likely septic, unclear source at this time per chart. GOC discussion pending.      Source: Patient [x]    Family [x] wife at bedside     other [x] electronic medical record    Diet : Regular diet + 1 Ensure Clear daily (provides 240kcal, 8g protein) + 1 Might[employer] Shake      Patient reports [ ] nausea  [ ] vomiting [ ] diarrhea [ ] constipation  [ ]chewing problems [ ] swallowing issues  [x] other: No symptoms of acute GI distress per pt and wife. However, pt with loose stools yesterday, improved today.      PO intake:  < 50% [ ] 50-75% [x]   % [ ]  other : Pt ate chocolate Nestle protein shake brought from home this AM (wife notes ~23g pro/shake) and 50% of lunch. Pt did not try Ensure clear or Mighty Health Shake yet, but will try later today or tomorrow- they are at bedside when pt wants it. Pt declined providing additional food preferences at this time. Still c/o feeling full and low appetite, hoping to eat more after paracentesis. Pt and family made aware RD remains available.     Source for PO intake [x] Patient [x] family [ ] chart [ ] staff [ ] other     Enteral /Parenteral Nutrition: n/a      Current Weight: no new wt to assess  09-20 189 lbs (standing)  % Weight Change    Pertinent Medications: MEDICATIONS  (STANDING):  amylase/lipase/protease  (CREON 12,000 Units) 1 Capsule(s) Oral three times a day  cefTRIAXone   IVPB 1 Gram(s) IV Intermittent every 24 hours  digoxin     Tablet 0.125 milliGRAM(s) Oral daily  metoprolol tartrate 25 milliGRAM(s) Oral two times a day  pantoprazole    Tablet 40 milliGRAM(s) Oral before breakfast    MEDICATIONS  (PRN):    Pertinent Labs:  09-21 Na133 mmol/L<L> Glu 143 mg/dL<H> K+ 3.7 mmol/L Cr  0.98 mg/dL BUN 12 mg/dL 09-21 Phos 3.0 mg/dL 09-21 Alb 2.3 g/dL<L>      Skin: No change since initial, no pressure injuries noted in chart.  Pt with ascites noted.    Estimated Needs:   [x] no change since previous assessment  [ ] recalculated:       Previous Nutrition Diagnosis:   [ ] Malnutrition, severe    [ ] Inadequate Energy Intake [ ]Inadequate Oral Intake [ ] Excessive Energy Intake     [ ] Underweight [ ] Increased Nutrient Needs [ ] Overweight/Obesity     [ ] Altered GI Function [ ] Unintended Weight Loss [ ] Food & Nutrition Related Knowledge Deficit          Nutrition Diagnosis is [x] ongoing  [ ] resolved [ ] not applicable          New Nutrition Diagnosis: [x] not applicable    [ ] Inadequate Protein Energy Intake [ ]Inadequate Oral Intake [ ] Excessive Energy Intake     [ ] Underweight [ ] Increased Nutrient Needs [ ] Overweight/Obesity     [ ] Altered GI Function [ ] Unintended Weight Loss [ ] Food & Nutrition Related Knowledge Deficit[ ] Limited Adherence to nutrition related recommendations [ ] Malnutrition  [ ] other: Free text       Related to:      As evidenced by:      Interventions:     Recommend  1. Continue to provide current diet order, no therapeutic diet restrictions indicated at this time.   2. Continue to provide 1 Ensure Clear daily (provides 240kcal, 8g protein) + 1 Might[employer] Shake  3. Monitor for nutrition supplementation acceptance  4. Recommend add multivitamin  5. Continue to obtain and provide pt food preferences as feasible       Monitoring and Evaluation:     [x] PO intake [x] Tolerance to diet prescription [x] weights [x] follow up per protocol    [x] other: RD remains available. Katy Salazar RD, Pager: [phone] Pt seen on [facility] for initial malnutrition follow up.    Chart reviewed, events noted. Pt is 73yoM admitted for hypotension s/p therapeutic paracentesis at clinic. Pt with recently diagnosed metastatic pancreatic adenocarcinoma on chemo, s/p biliary stent, with ascites. Plan for paracentesis today 9/21. PMH includes Afib, holding A/C currently given pending paracentesis. Pt with hyponatremia noted 9/21, likely in setting of ascites. Continue to monitor. Pt likely septic, unclear source at this time per chart. GOC discussion pending.      Source: Patient [x]    Family [x] wife at bedside     other [x] electronic medical record    Diet : Regular diet + 1 Ensure Clear daily (provides 240kcal, 8g protein) + 1 Might[employer] Health Shake      Patient reports [ ] nausea  [ ] vomiting [ ] diarrhea [ ] constipation  [ ]chewing problems [ ] swallowing issues  [x] other: No symptoms of acute GI distress per pt and wife. However, pt with loose stools yesterday, improved today.      PO intake:  < 50% [ ] 50-75% [x]   % [ ]  other : Pt ate chocolate Nestle protein shake brought from home this AM (wife notes ~23g pro/shake) and 50% of lunch. Pt did not try Ensure clear or Mighty Health Shake yet, but will try later today or tomorrow- they are at bedside when pt wants it. Pt declined providing additional food preferences at this time. Still c/o feeling full and low appetite, hoping to eat more after paracentesis. Pt and family made aware RD remains available.     Source for PO intake [x] Patient [x] family [ ] chart [ ] staff [ ] other     Enteral /Parenteral Nutrition: n/a      Current Weight: no new wt to assess  09-20 189 lbs (standing)  % Weight Change    Pertinent Medications: MEDICATIONS  (STANDING):  amylase/lipase/protease  (CREON 12,000 Units) 1 Capsule(s) Oral three times a day  cefTRIAXone   IVPB 1 Gram(s) IV Intermittent every 24 hours  digoxin     Tablet 0.125 milliGRAM(s) Oral daily  metoprolol tartrate 25 milliGRAM(s) Oral two times a day  pantoprazole    Tablet 40 milliGRAM(s) Oral before breakfast    MEDICATIONS  (PRN):    Pertinent Labs:  09-21 Na133 mmol/L<L> Glu 143 mg/dL<H> K+ 3.7 mmol/L Cr  0.98 mg/dL BUN 12 mg/dL 09-21 Phos 3.0 mg/dL 09-21 Alb 2.3 g/dL<L>      Skin: No change since initial, no pressure injuries noted in chart.  Pt with ascites noted.    Estimated Needs:   [x] no change since previous assessment  [ ] recalculated:       Previous Nutrition Diagnosis:   [ ] Malnutrition, severe    [ ] Inadequate Energy Intake [ ]Inadequate Oral Intake [ ] Excessive Energy Intake     [ ] Underweight [ ] Increased Nutrient Needs [ ] Overweight/Obesity     [ ] Altered GI Function [ ] Unintended Weight Loss [ ] Food & Nutrition Related Knowledge Deficit          Nutrition Diagnosis is [x] ongoing  [ ] resolved [ ] not applicable          New Nutrition Diagnosis: [x] not applicable    [ ] Inadequate Protein Energy Intake [ ]Inadequate Oral Intake [ ] Excessive Energy Intake     [ ] Underweight [ ] Increased Nutrient Needs [ ] Overweight/Obesity     [ ] Altered GI Function [ ] Unintended Weight Loss [ ] Food & Nutrition Related Knowledge Deficit[ ] Limited Adherence to nutrition related recommendations [ ] Malnutrition  [ ] other: Free text       Related to:      As evidenced by:      Interventions:     Recommend  1. Continue to provide current diet order, no therapeutic diet restrictions indicated at this time.   2. Continue to provide 1 Ensure Clear daily (provides 240kcal, 8g protein) + 1 Might[employer] Health Shake  3. Monitor for nutrition supplementation acceptance  4. Continue to obtain and provide pt food preferences as feasible       Monitoring and Evaluation:     [x] PO intake [x] Tolerance to diet prescription [x] weights [x] follow up per protocol    [x] other: RD remains available. Katy Salazar RD, Pager: [phone]

## 2018-09-21 NOTE — PROGRESS NOTE ADULT - PROBLEM SELECTOR PLAN 6
DVT PPx: Holding PPx now pending IR planning. DVT PPx: Holding PPx now pending IR planning. -- will dose AC tonight s/p procedure

## 2018-09-21 NOTE — PROGRESS NOTE ADULT - SUBJECTIVE AND OBJECTIVE BOX
Interventional Radiology  Procedure Note    Procedure: Paracentesis    Pre- Procedure Diagnosis: Pancreatic CA    Post- Procedure Diagnosis: same    Indication: recurrent ascites    Physician: Talya  PA: Demetra Roman PA-C    Needle: 5Fr drainer    Ascites Drained: 6L    Color: clear yellow    Specimens: sent    Albumin: 25% in 50cc albumin x1    Estimated Blood Loss: minimal    Complications: none    Preliminary Report:  Under sterile conditions, abdomen prepped and draped, lidocaine 2% local given, using ultrasound guidance to right lower quadrant a  5Fr drainage needle drained 6L of ascites. Dry sterile dressing applied to insertion site. No immediate complications. Patient tolerated procedure well, VSS. ( Official report to follow).

## 2018-09-21 NOTE — PROGRESS NOTE ADULT - PROBLEM SELECTOR PLAN 4
Concerns for SBP given Leukocytosis and hypotension. However afebrile and non tender on abdominal exam.    - Will continue with Ceftriaxone IV.  - Diagnostic paracentesis to ascertain if SBP is present. - Oncology consulted.  - Will have GOC discussion.  - Will continue CREON (Pancreatic Enzyme supplmentation).

## 2018-09-21 NOTE — PROGRESS NOTE ADULT - PROBLEM SELECTOR PLAN 5
- Oncology consulted.  - Will have GOC discussion.  - Will continue CREON (Pancreatic Enzyme supplmentation). Concerns for SBP given Leukocytosis and hypotension. However afebrile and non tender on abdominal exam.    - Will continue with Ceftriaxone IV.  - Diagnostic paracentesis to ascertain if SBP is present.

## 2018-09-21 NOTE — PROGRESS NOTE ADULT - ASSESSMENT
73M PMH recently diagnosed metastatic pancreatic adenocarcinoma s/p biliary stent (mets to liver, started Gemzar/abraxane 8/29/18), afib on xarelto, HTN presents with fever, generalized weakness, and poor appetite for one day. Likely septic, however source unclear at this time. With near-hypotension and afib RVR, rate somewhat improved after IVF resuscitation. Now has improving blood pressures, however still in Atrial Fibrillation with RVR optimizing for possible IR guided therapeutic/diagnostic paracentesis.

## 2018-09-22 ENCOUNTER — TRANSCRIPTION ENCOUNTER (OUTPATIENT)
Age: 73
End: 2018-09-22

## 2018-09-22 VITALS
HEART RATE: 108 BPM | OXYGEN SATURATION: 95 % | SYSTOLIC BLOOD PRESSURE: 95 MMHG | TEMPERATURE: 98 F | DIASTOLIC BLOOD PRESSURE: 65 MMHG | RESPIRATION RATE: 18 BRPM

## 2018-09-22 LAB
ALBUMIN SERPL ELPH-MCNC: 2.3 G/DL — LOW (ref 3.3–5)
ALP SERPL-CCNC: 245 U/L — HIGH (ref 40–120)
ALT FLD-CCNC: 12 U/L — SIGNIFICANT CHANGE UP (ref 10–45)
ANION GAP SERPL CALC-SCNC: 9 MMOL/L — SIGNIFICANT CHANGE UP (ref 5–17)
AST SERPL-CCNC: 26 U/L — SIGNIFICANT CHANGE UP (ref 10–40)
BASOPHILS # BLD AUTO: 0.04 K/UL — SIGNIFICANT CHANGE UP (ref 0–0.2)
BASOPHILS NFR BLD AUTO: 0.2 % — SIGNIFICANT CHANGE UP (ref 0–2)
BILIRUB SERPL-MCNC: 1.2 MG/DL — SIGNIFICANT CHANGE UP (ref 0.2–1.2)
BUN SERPL-MCNC: 12 MG/DL — SIGNIFICANT CHANGE UP (ref 7–23)
CALCIUM SERPL-MCNC: 7.7 MG/DL — LOW (ref 8.4–10.5)
CHLORIDE SERPL-SCNC: 105 MMOL/L — SIGNIFICANT CHANGE UP (ref 96–108)
CO2 SERPL-SCNC: 23 MMOL/L — SIGNIFICANT CHANGE UP (ref 22–31)
CREAT SERPL-MCNC: 1.03 MG/DL — SIGNIFICANT CHANGE UP (ref 0.5–1.3)
EOSINOPHIL # BLD AUTO: 0.06 K/UL — SIGNIFICANT CHANGE UP (ref 0–0.5)
EOSINOPHIL NFR BLD AUTO: 0.3 % — SIGNIFICANT CHANGE UP (ref 0–6)
GLUCOSE SERPL-MCNC: 122 MG/DL — HIGH (ref 70–99)
HCT VFR BLD CALC: 32.4 % — LOW (ref 39–50)
HGB BLD-MCNC: 10.4 G/DL — LOW (ref 13–17)
IMM GRANULOCYTES NFR BLD AUTO: 3 % — HIGH (ref 0–1.5)
INR BLD: 2.43 RATIO — HIGH (ref 0.88–1.16)
LYMPHOCYTES # BLD AUTO: 1.21 K/UL — SIGNIFICANT CHANGE UP (ref 1–3.3)
LYMPHOCYTES # BLD AUTO: 6.3 % — LOW (ref 13–44)
MAGNESIUM SERPL-MCNC: 1.8 MG/DL — SIGNIFICANT CHANGE UP (ref 1.6–2.6)
MCHC RBC-ENTMCNC: 31 PG — SIGNIFICANT CHANGE UP (ref 27–34)
MCHC RBC-ENTMCNC: 32.1 GM/DL — SIGNIFICANT CHANGE UP (ref 32–36)
MCV RBC AUTO: 96.4 FL — SIGNIFICANT CHANGE UP (ref 80–100)
MONOCYTES # BLD AUTO: 1.4 K/UL — HIGH (ref 0–0.9)
MONOCYTES NFR BLD AUTO: 7.3 % — SIGNIFICANT CHANGE UP (ref 2–14)
NEUTROPHILS # BLD AUTO: 16 K/UL — HIGH (ref 1.8–7.4)
NEUTROPHILS NFR BLD AUTO: 82.9 % — HIGH (ref 43–77)
NIGHT BLUE STAIN TISS: SIGNIFICANT CHANGE UP
PHOSPHATE SERPL-MCNC: 3.2 MG/DL — SIGNIFICANT CHANGE UP (ref 2.5–4.5)
PLATELET # BLD AUTO: 193 K/UL — SIGNIFICANT CHANGE UP (ref 150–400)
POTASSIUM SERPL-MCNC: 4.3 MMOL/L — SIGNIFICANT CHANGE UP (ref 3.5–5.3)
POTASSIUM SERPL-SCNC: 4.3 MMOL/L — SIGNIFICANT CHANGE UP (ref 3.5–5.3)
PROT SERPL-MCNC: 5 G/DL — LOW (ref 6–8.3)
PROTHROM AB SERPL-ACNC: 28 SEC — HIGH (ref 10–13.1)
RBC # BLD: 3.36 M/UL — LOW (ref 4.2–5.8)
RBC # FLD: 16.4 % — HIGH (ref 10.3–14.5)
SODIUM SERPL-SCNC: 137 MMOL/L — SIGNIFICANT CHANGE UP (ref 135–145)
SPECIMEN SOURCE: SIGNIFICANT CHANGE UP
WBC # BLD: 19.29 K/UL — HIGH (ref 3.8–10.5)
WBC # FLD AUTO: 19.29 K/UL — HIGH (ref 3.8–10.5)

## 2018-09-22 PROCEDURE — 99239 HOSP IP/OBS DSCHRG MGMT >30: CPT

## 2018-09-22 RX ORDER — DIGOXIN 250 MCG
1 TABLET ORAL
Qty: 30 | Refills: 0 | OUTPATIENT
Start: 2018-09-22 | End: 2018-10-21

## 2018-09-22 RX ADMIN — PANTOPRAZOLE SODIUM 40 MILLIGRAM(S): 20 TABLET, DELAYED RELEASE ORAL at 05:17

## 2018-09-22 RX ADMIN — Medication 1 CAPSULE(S): at 14:45

## 2018-09-22 RX ADMIN — Medication 1 CAPSULE(S): at 05:17

## 2018-09-22 RX ADMIN — Medication 0.12 MILLIGRAM(S): at 05:17

## 2018-09-22 RX ADMIN — Medication 25 MILLIGRAM(S): at 09:54

## 2018-09-22 RX ADMIN — CEFTRIAXONE 100 GRAM(S): 500 INJECTION, POWDER, FOR SOLUTION INTRAMUSCULAR; INTRAVENOUS at 09:58

## 2018-09-22 NOTE — PROGRESS NOTE ADULT - ATTENDING COMMENTS
s/p therapeutic paracentesis by IR yesterday pt is symptomatically much improved, weaned off O2, breathing comfortably on room air, without complaints, and eager to be discharged home; patient educated on new medications initiated, and plan for resumption of home beta-blocker dose, and instructed to follow up with oncologist and PMD within one week of discharge (he already has an appt w/ oncology scheduled for 9/24); all questions answered; total discharge time spent = 40minutes.
Agree with above note by R1 Dr. Mauro incl findings and plan, edited where appropriate  Pt w/hypotension during paracentesis for malignant ascites, new afib rvr  volume restored s/p albumin x 2, IVF, BP is better. Dig load for RVR  Diagnostic para to r/o infection, SBP is unlikely in malignancy but pt has new leukocytosis and recent needle insertion, c/w empiric abx pending cultures  a/c on hold pending procedures  d/w pt and wife at bedside  Attending Physician Dr. J Luis Danielson [phone]

## 2018-09-22 NOTE — DISCHARGE NOTE ADULT - CARE PROVIDER_API CALL
Ryan Bauer), Hematology; Internal Medicine; Medical Oncology  [address] [place] [address]  Phone: [phone]  Fax: [phone]    Melissa Torres), Cardiovascular Disease  [address] [place] [id number]  Phone: [phone]  Fax: [phone]

## 2018-09-22 NOTE — PROGRESS NOTE ADULT - PROBLEM SELECTOR PLAN 1
- continue Metoprolol tartrate 25 mg PO   - patient continues to be in AFib with HR up to the 110s  - will start home dose of Lopressor on d/c.   - If continues to have hypotension will also consider Albumin infusion.   - Cardiology consulted, recommendations appreciated. continue Digoxin for rate control and inotropic effect.   - TTE is negative for any effusion.

## 2018-09-22 NOTE — PROGRESS NOTE ADULT - SUBJECTIVE AND OBJECTIVE BOX
Asif Hamm, PGY-2  Internal Medicine Team  Pager:  [phone]/ [phone]    Patient is a 73y old  Male who presents with a chief complaint of hypotension (21 Sep 2018 15:54)      SUBJECTIVE / OVERNIGHT EVENTS: DEDRICK overnight. Patient denies any CP, palpitations, SOB, abdominal pain, N/V/D/C.     MEDICATIONS  (STANDING):  amylase/lipase/protease  (CREON 12,000 Units) 1 Capsule(s) Oral three times a day  digoxin     Tablet 0.125 milliGRAM(s) Oral daily  metoprolol tartrate 25 milliGRAM(s) Oral two times a day  pantoprazole    Tablet 40 milliGRAM(s) Oral before breakfast  rivaroxaban 20 milliGRAM(s) Oral at bedtime    MEDICATIONS  (PRN):      OBJECTIVE:    Vital Signs Last 24 Hrs  T(C): 36.6 (22 Sep 2018 11:24), Max: 37 (22 Sep 2018 00:10)  T(F): 97.9 (22 Sep 2018 11:24), Max: 98.6 (22 Sep 2018 00:10)  HR: 111 (22 Sep 2018 11:24) (93 - 120)  BP: 111/71 (22 Sep 2018 11:24) (91/60 - 111/71)  BP(mean): --  RR: 18 (22 Sep 2018 11:24) (18 - 18)  SpO2: 93% (22 Sep 2018 11:24) (91% - 95%)    CAPILLARY BLOOD GLUCOSE        I&O's Summary    21 Sep 2018 07:01  -  22 Sep 2018 07:00  --------------------------------------------------------  IN: 845 mL / OUT: 1100 mL / NET: -255 mL    22 Sep 2018 07:01  -  22 Sep 2018 11:48  --------------------------------------------------------  IN: 265 mL / OUT: 0 mL / NET: 265 mL        PHYSICAL EXAM:  GENERAL: NAD, well-developed  HEAD:  Atraumatic, Normocephalic  EYES: EOMI, PERRLA, conjunctiva and sclera clear  NECK: Supple, No JVD  CHEST/LUNG: Clear to auscultation bilaterally; No wheeze  HEART: Regular rate. Irregularly irregular rhythm; No murmurs, rubs, or gallops  ABDOMEN: Soft, Nontender, Nondistended; Bowel sounds present. dressing in RLQ c/d/i.   EXTREMITIES:  2+ Peripheral Pulses, 1-2+ b/l LE pitting edema.   PSYCH: AAOx3  NEUROLOGY: non-focal  SKIN: jaundiced     LABS:                        10.4   19.29 )-----------( 193      ( 22 Sep 2018 08:10 )             32.4     Auto Eosinophil # 0.06  / Auto Eosinophil % 0.3   / Auto Neutrophil # 16.00 / Auto Neutrophil % 82.9  / BANDS % x                            10.9   19.24 )-----------( 228      ( 21 Sep 2018 07:55 )             33.6     Auto Eosinophil # 0.15  / Auto Eosinophil % 0.8   / Auto Neutrophil # 17.30 / Auto Neutrophil % 89.9  / BANDS % x        09-22    137  |  105  |  12  ----------------------------<  122<H>  4.3   |  23  |  1.03  09-21    133<L>  |  100  |  12  ----------------------------<  143<H>  3.7   |  21<L>  |  0.98    Ca    7.7<L>      22 Sep 2018 06:47  Mg     1.8     09-22  Phos  3.2     09-22  TPro  5.0<L>  /  Alb  2.3<L>  /  TBili  1.2  /  DBili  x   /  AST  26  /  ALT  12  /  AlkPhos  245<H>  09-22  TPro  5.1<L>  /  Alb  2.3<L>  /  TBili  1.4<H>  /  DBili  x   /  AST  36  /  ALT  17  /  AlkPhos  300<H>  09-21    PT/INR - ( 22 Sep 2018 08:05 )   PT: 28.0 sec;   INR: 2.43 ratio         PTT - ( 21 Sep 2018 07:03 )  PTT:33.4 sec            RESPIRATORY  VENT:    ABG:     VBG:     RADIOLOGY & ADDITIONAL TESTS:  (Imaging Personally Reviewed)    Consultant(s) Notes Reviewed:      Care Discussed with Consultants/Other Providers: Asif Hamm, PGY-2  Internal Medicine Team  Pager:  [phone]/ [phone]    Patient is a 73y old  Male who presents with a chief complaint of hypotension (21 Sep 2018 15:54)    SUBJECTIVE / OVERNIGHT EVENTS: DEDRICK overnight. Patient denies any CP, palpitations, SOB, abdominal pain, N/V/D/C.     MEDICATIONS  (STANDING):  amylase/lipase/protease  (CREON 12,000 Units) 1 Capsule(s) Oral three times a day  digoxin     Tablet 0.125 milliGRAM(s) Oral daily  metoprolol tartrate 25 milliGRAM(s) Oral two times a day  pantoprazole    Tablet 40 milliGRAM(s) Oral before breakfast  rivaroxaban 20 milliGRAM(s) Oral at bedtime    OBJECTIVE:  Vital Signs Last 24 Hrs  T(F): 97.9 (22 Sep 2018 11:24), Max: 98.6 (22 Sep 2018 00:10)  HR: 111 (22 Sep 2018 11:24) (93 - 120)  BP: 111/71 (22 Sep 2018 11:24) (91/60 - 111/71)  RR: 18 (22 Sep 2018 11:24) (18 - 18)  SpO2: 93% (22 Sep 2018 11:24) (91% - 95%)    I&O's Summary  21 Sep 2018 07:01  -  22 Sep 2018 07:00  --------------------------------------------------------  IN: 845 mL / OUT: 1100 mL / NET: -255 mL    22 Sep 2018 07:01  -  22 Sep 2018 11:48  --------------------------------------------------------  IN: 265 mL / OUT: 0 mL / NET: 265 mL    PHYSICAL EXAM:  GENERAL: NAD, well-developed  HEAD:  Atraumatic, Normocephalic  EYES: EOMI, PERRLA, conjunctiva and sclera clear  NECK: Supple, No JVD  CHEST/LUNG: Clear to auscultation bilaterally; No wheeze  HEART: Regular rate. Irregularly irregular rhythm; No murmurs, rubs, or gallops  ABDOMEN: Soft, Nontender, Nondistended; Bowel sounds present. dressing in RLQ c/d/i.   EXTREMITIES:  2+ Peripheral Pulses, 1-2+ b/l LE pitting edema.   PSYCH: AAOx3  NEUROLOGY: non-focal  SKIN: jaundiced     LABS:                      10.4   19.29 )-----------( 193      ( 22 Sep 2018 08:10 )             32.4     Auto Eosinophil # 0.06  / Auto Eosinophil % 0.3   / Auto Neutrophil # 16.00 / Auto Neutrophil % 82.9  / BANDS % x        09-22  137  |  105  |  12  ----------------------------<  122<H>  4.3   |  23  |  1.03    Ca    7.7<L>      22 Sep 2018 06:47  Mg     1.8     09-22  Phos  3.2     09-22  TPro  5.0<L>  /  Alb  2.3<L>  /  TBili  1.2  /  DBili  x   /  AST  26  /  ALT  12  /  AlkPhos  245<H>  09-22    PT/INR - ( 22 Sep 2018 08:05 )   PT: 28.0 sec;   INR: 2.43 ratio

## 2018-09-22 NOTE — DISCHARGE NOTE ADULT - MEDICATION SUMMARY - MEDICATIONS TO TAKE
I will START or STAY ON the medications listed below when I get home from the hospital:    digoxin 125 mcg (0.125 mg) oral tablet  -- 1 tab(s) by mouth once a day  -- Indication: For Atrial fibrillation with RVR    Xarelto 20 mg oral tablet  -- 1 tab(s) by mouth once a day  -- Indication: For Atrial fibrillation with RVR    Reglan 5 mg oral tablet  -- 1 tab(s) by mouth 4 times a day (before meals and at bedtime), As Needed  -- Indication: For Nausea/Vomitting     Lopressor 50 mg oral tablet  -- 1 tab(s) by mouth 2 times a day   -- It is very important that you take or use this exactly as directed.  Do not skip doses or discontinue unless directed by your doctor.  May cause drowsiness.  Alcohol may intensify this effect.  Use care when operating dangerous machinery.  Some non-prescription drugs may aggravate your condition.  Read all labels carefully.  If a warning appears, check with your doctor before taking.  Take with food or milk.  This drug may impair the ability to drive or operate machinery.  Use care until you become familiar with its effects.    -- Indication: For Atrial fibrillation with RVR    Creon 12,000 units oral delayed release capsule  -- 1-2 cap(s) by mouth 3 times a day before meals  -- Indication: For Pancreatic cancer metastasized to liver    lactobacillus acidophilus oral capsule  -- 1 cap(s) by mouth 3 times a day   -- Indication: For Health maintenance     pantoprazole 40 mg oral delayed release tablet  -- 1 tab(s) by mouth once a day   -- It is very important that you take or use this exactly as directed.  Do not skip doses or discontinue unless directed by your doctor.  Obtain medical advice before taking any non-prescription drugs as some may affect the action of this medication.  Swallow whole.  Do not crush.    -- Indication: For GERD

## 2018-09-22 NOTE — DISCHARGE NOTE ADULT - HOSPITAL COURSE
74yo male with PMHx significant for recently diagnosed metastatic pancreatic adenocarcinoma s/p biliary stent (mets to liver, s/p Gemzar/abraxane 9/5/18, c/b portal vein thrombosis resulting in malignant recurrent ascties), Afib on xarelto, HTN present from [facility] ER with hypotension after removing 1L from therapeutic paracentesis. Patient's wife at bedside providing most history. Patient went to see his oncology yesterday for chemotherapy, found to have worsening abdominal distention and SOB, was sent over for urgent therapeutic paracentesis. Pt denied abdominal pain.  After removing 1L fluid, patient was noted to be hypotensive to 60/40 and went into afib with RVR. Patient appears lethargic at that time per wife. He was given 50 of albumin and 150cc bolus and subsequently transferred to the hospital for further management. He had 2 rounds of therapeutic paracentesis (~5-6L removed everytime) and has been tolerating the procedure well in the past. Denied fever, chill, nausea, vomiting, CP, abdominal pain, diarrhea, constipation, or urinary symptoms. Endorse mildly SOB. The wife also states that the patient has been diagnosed with afib for 3 years, and since then, his heart rates has been around 120-130s.    In the ED, initial VS T98.9F, , /69, RR 18 SpO2 97% on 2L NC. Labs significant for WBC 14, hgb 10.8, K 3.2, alb 2.3, bili 1.7, , CXR with small b/l effusion. He was given 1L NS, 100ml albumin and KCl 40mg X1.     HOSPITAL COURSE: 74yo male with PMHx significant for recently diagnosed metastatic pancreatic adenocarcinoma s/p biliary stent (mets to liver, s/p Gemzar/abraxane 9/5/18, c/b portal vein thrombosis resulting in malignant recurrent ascties), Afib on xarelto, HTN present from [facility] with hypotension after removing 1L from therapeutic paracentesis. Patient's wife at bedside providing most history. Patient went to see his oncology yesterday for chemotherapy, found to have worsening abdominal distention and SOB, was sent over for urgent therapeutic paracentesis. Pt denied abdominal pain.  After removing 1L fluid, patient was noted to be hypotensive to 60/40 and went into afib with RVR. Patient appears lethargic at that time per wife. He was given 50 of albumin and 150cc bolus and subsequently transferred to the hospital for further management. He had 2 rounds of therapeutic paracentesis (~5-6L removed everytime) and has been tolerating the procedure well in the past. Denied fever, chill, nausea, vomiting, CP, abdominal pain, diarrhea, constipation, or urinary symptoms. Endorse mildly SOB. The wife also states that the patient has been diagnosed with afib for 3 years, and since then, his heart rates has been around 120-130s.    In the ED, initial VS T98.9F, , /69, RR 18 SpO2 97% on 2L NC. Labs significant for WBC 14, hgb 10.8, K 3.2, alb 2.3, bili 1.7, , CXR with small b/l effusion. He was given 1L NS, 100ml albumin and KCl 40mg X1.     HOSPITAL COURSE:  Patient was given IVF and Albumin in the ED for hypotension, with improvement of his BP. Patient was found to be in AFib with RVR on admission and was Dig loaded. There was concern for SBP and the patient was started on IV Ceftriaxone. His AC was held, and he underwent a repeat diagnostic paracentesis with IR on 9/21, which was negative for SBP. Culture results from his para on 9/20 remained negative, and Ceftriaxone was d/fabio. Patient continued to clinically improve and was discharged to home in stable condition.

## 2018-09-22 NOTE — PROGRESS NOTE ADULT - PROBLEM SELECTOR PLAN 3
Most likely secondary to hypotension due A-fib RVR during paracentesis.   - now resolved   - Will continue to monitor BUN/Cr.

## 2018-09-22 NOTE — PROGRESS NOTE ADULT - PROBLEM SELECTOR PLAN 5
Intially there was concern for SBP given Leukocytosis and hypotension. However afebrile and non tender on abdominal exam.  - s/p diagnostic para with IR, negative for SBP. will f/u cultures  - Will d/c Ceftriaxone

## 2018-09-22 NOTE — DISCHARGE NOTE ADULT - CARE PLAN
Principal Discharge DX:	Hypotension, unspecified hypotension type  Goal:	Resolution  Assessment and plan of treatment:	You came into the hospital because your blood pressure was low. This was likely caused by fluid shift after your paracentesis. Please restart your home Metoprolol on discharge. Monitor your blood pressure at home, and if your blood pressure decreases to the 80s, please call your PCP, or return to the ED immediatley  Secondary Diagnosis:	Sepsis, due to unspecified organism  Assessment and plan of treatment:	There was concern that the fluid in your abdomen was infected. You were started on IV antiobiotics as a precautionary measure. The fluid in your abdomen was sampled, and did not appear to be infected. Please follow up with your PCP within 1-2 weeks of discharge. If you begin to experience fevers at home, or significant abdominal pain, please call your PCP, or return to the ED immediately. Principal Discharge DX:	Hypotension, unspecified hypotension type  Goal:	Resolution  Assessment and plan of treatment:	You came into the hospital because your blood pressure was low. This was likely caused by fluid shift after your paracentesis. Please restart your home Metoprolol on discharge. Monitor your blood pressure at home, and if your blood pressure decreases to the 80s, please call your PCP, or return to the ED immediatley  Secondary Diagnosis:	Sepsis, due to unspecified organism  Assessment and plan of treatment:	There was concern that the fluid in your abdomen was infected. You were started on IV antibiotics as a precautionary measure. The fluid in your abdomen was sampled, and did not appear to be infected. Please follow up with your PCP within 1-2 weeks of discharge. If you begin to experience fevers at home, or significant abdominal pain, please call your PCP, or return to the ED immediately.  Secondary Diagnosis:	Atrial fibrillation with RVR  Assessment and plan of treatment:	You were found to be in Atrial Fibrillation with Rapid Ventricular Response when you came into the ED. Your lopressor was decreased because of your hypotension. You should restart your home dose when you are discharged. You were also started on a new medication called Digoxin. Continue to take all your medications as prescribed. Please follow up with your cardiologist within 1 week of discharge. If you begin to experience significant chest pain, shortness of breath, or light headedness, please call your cardiologist immediately, or return to the ED immediately.

## 2018-09-22 NOTE — PROGRESS NOTE ADULT - PROBLEM SELECTOR PLAN 2
- Oncology consulted.  - s/p IR guided para. negative for SBP  - will d/c Ceftriaxone as cultures have remained negative   - continue to f/u cultures

## 2018-09-22 NOTE — DISCHARGE NOTE ADULT - PATIENT PORTAL LINK FT
You can access the [facility] Patient Portal, offered by [facility], by registering with the following website: [email or web address]

## 2018-09-24 ENCOUNTER — RESULT REVIEW (OUTPATIENT)
Age: 73
End: 2018-09-24

## 2018-09-24 ENCOUNTER — APPOINTMENT (OUTPATIENT)
Dept: INFUSION THERAPY | Facility: HOSPITAL | Age: 73
End: 2018-09-24

## 2018-09-24 ENCOUNTER — LABORATORY RESULT (OUTPATIENT)
Age: 73
End: 2018-09-24

## 2018-09-24 ENCOUNTER — INBOUND DOCUMENT (OUTPATIENT)
Age: 73
End: 2018-09-24

## 2018-09-24 ENCOUNTER — APPOINTMENT (OUTPATIENT)
Dept: HEMATOLOGY ONCOLOGY | Facility: CLINIC | Age: 73
End: 2018-09-24
Payer: MEDICARE

## 2018-09-24 VITALS
OXYGEN SATURATION: 97 % | BODY MASS INDEX: 26.56 KG/M2 | SYSTOLIC BLOOD PRESSURE: 102 MMHG | HEART RATE: 103 BPM | TEMPERATURE: 97.8 F | DIASTOLIC BLOOD PRESSURE: 70 MMHG | RESPIRATION RATE: 16 BRPM | WEIGHT: 177.03 LBS

## 2018-09-24 DIAGNOSIS — C25.9 MALIGNANT NEOPLASM OF PANCREAS, UNSPECIFIED: ICD-10-CM

## 2018-09-24 LAB
CULTURE RESULTS: SIGNIFICANT CHANGE UP
CULTURE RESULTS: SIGNIFICANT CHANGE UP
EOSINOPHIL # BLD AUTO: 0.7 K/UL — HIGH (ref 0–0.5)
EOSINOPHIL NFR BLD AUTO: 1 % — SIGNIFICANT CHANGE UP (ref 0–6)
HCT VFR BLD CALC: 37 % — LOW (ref 39–50)
HGB BLD-MCNC: 11.6 G/DL — LOW (ref 13–17)
LYMPHOCYTES # BLD AUTO: 1.9 K/UL — SIGNIFICANT CHANGE UP (ref 1–3.3)
LYMPHOCYTES # BLD AUTO: 12 % — LOW (ref 13–44)
MCHC RBC-ENTMCNC: 31.3 PG — SIGNIFICANT CHANGE UP (ref 27–34)
MCHC RBC-ENTMCNC: 31.4 G/DL — LOW (ref 32–36)
MCV RBC AUTO: 99.7 FL — SIGNIFICANT CHANGE UP (ref 80–100)
MONOCYTES # BLD AUTO: 1.6 K/UL — HIGH (ref 0–0.9)
MONOCYTES NFR BLD AUTO: 10 % — SIGNIFICANT CHANGE UP (ref 2–14)
NEUTROPHILS # BLD AUTO: 19.4 K/UL — HIGH (ref 1.8–7.4)
NEUTROPHILS NFR BLD AUTO: 77 % — SIGNIFICANT CHANGE UP (ref 43–77)
NRBC # BLD: 1 /100 — HIGH (ref 0–0)
PLAT MORPH BLD: NORMAL — SIGNIFICANT CHANGE UP
PLATELET # BLD AUTO: 248 K/UL — SIGNIFICANT CHANGE UP (ref 150–400)
RBC # BLD: 3.71 M/UL — LOW (ref 4.2–5.8)
RBC # FLD: 15.1 % — HIGH (ref 10.3–14.5)
RBC BLD AUTO: SIGNIFICANT CHANGE UP
SPECIMEN SOURCE: SIGNIFICANT CHANGE UP
SPECIMEN SOURCE: SIGNIFICANT CHANGE UP
WBC # BLD: 23.7 K/UL — HIGH (ref 3.8–10.5)
WBC # FLD AUTO: 23.7 K/UL — HIGH (ref 3.8–10.5)

## 2018-09-24 PROCEDURE — 99214 OFFICE O/P EST MOD 30 MIN: CPT

## 2018-09-24 RX ORDER — DRONABINOL 2.5 MG/1
2.5 CAPSULE ORAL 3 TIMES DAILY
Qty: 30 | Refills: 3 | Status: ACTIVE | COMMUNITY
Start: 2018-09-24 | End: 1900-01-01

## 2018-09-25 DIAGNOSIS — Z51.11 ENCOUNTER FOR ANTINEOPLASTIC CHEMOTHERAPY: ICD-10-CM

## 2018-09-25 DIAGNOSIS — R11.2 NAUSEA WITH VOMITING, UNSPECIFIED: ICD-10-CM

## 2018-09-25 RX ORDER — ESCITALOPRAM OXALATE 10 MG/1
10 TABLET ORAL DAILY
Qty: 60 | Refills: 3 | Status: ACTIVE | COMMUNITY
Start: 2018-09-25 | End: 1900-01-01

## 2018-09-26 LAB
CULTURE RESULTS: SIGNIFICANT CHANGE UP
SPECIMEN SOURCE: SIGNIFICANT CHANGE UP

## 2018-09-28 ENCOUNTER — TRANSCRIPTION ENCOUNTER (OUTPATIENT)
Age: 73
End: 2018-09-28

## 2018-09-28 ENCOUNTER — OTHER (OUTPATIENT)
Age: 73
End: 2018-09-28

## 2018-10-01 ENCOUNTER — APPOINTMENT (OUTPATIENT)
Dept: INFUSION THERAPY | Facility: HOSPITAL | Age: 73
End: 2018-10-01

## 2018-10-01 ENCOUNTER — RESULT REVIEW (OUTPATIENT)
Age: 73
End: 2018-10-01

## 2018-10-01 ENCOUNTER — FORM ENCOUNTER (OUTPATIENT)
Age: 73
End: 2018-10-01

## 2018-10-01 LAB
BASOPHILS # BLD AUTO: 0 K/UL — SIGNIFICANT CHANGE UP (ref 0–0.2)
BASOPHILS NFR BLD AUTO: 0.2 % — SIGNIFICANT CHANGE UP (ref 0–2)
EOSINOPHIL # BLD AUTO: 0 K/UL — SIGNIFICANT CHANGE UP (ref 0–0.5)
EOSINOPHIL NFR BLD AUTO: 0 % — SIGNIFICANT CHANGE UP (ref 0–6)
HCT VFR BLD CALC: 34.8 % — LOW (ref 39–50)
HGB BLD-MCNC: 11.4 G/DL — LOW (ref 13–17)
LYMPHOCYTES # BLD AUTO: 1.1 K/UL — SIGNIFICANT CHANGE UP (ref 1–3.3)
LYMPHOCYTES # BLD AUTO: 7.9 % — LOW (ref 13–44)
MCHC RBC-ENTMCNC: 32.7 G/DL — SIGNIFICANT CHANGE UP (ref 32–36)
MCHC RBC-ENTMCNC: 32.7 PG — SIGNIFICANT CHANGE UP (ref 27–34)
MCV RBC AUTO: 100 FL — SIGNIFICANT CHANGE UP (ref 80–100)
MONOCYTES # BLD AUTO: 0.3 K/UL — SIGNIFICANT CHANGE UP (ref 0–0.9)
MONOCYTES NFR BLD AUTO: 2.3 % — SIGNIFICANT CHANGE UP (ref 2–14)
NEUTROPHILS # BLD AUTO: 12.9 K/UL — HIGH (ref 1.8–7.4)
NEUTROPHILS NFR BLD AUTO: 89.6 % — HIGH (ref 43–77)
PLATELET # BLD AUTO: 92 K/UL — SIGNIFICANT CHANGE UP (ref 150–400)
RBC # BLD: 3.47 M/UL — LOW (ref 4.2–5.8)
RBC # FLD: 14.4 % — SIGNIFICANT CHANGE UP (ref 10.3–14.5)
WBC # BLD: 14.4 K/UL — HIGH (ref 3.8–10.5)
WBC # FLD AUTO: 14.4 K/UL — HIGH (ref 3.8–10.5)

## 2018-10-01 RX ORDER — LIPASE/PROTEASE/AMYLASE 16-48-48K
1 CAPSULE,DELAYED RELEASE (ENTERIC COATED) ORAL
Qty: 0 | Refills: 0 | COMMUNITY

## 2018-10-01 RX ORDER — RIVAROXABAN 15 MG-20MG
1 KIT ORAL
Qty: 0 | Refills: 0 | COMMUNITY

## 2018-10-01 RX ORDER — METOCLOPRAMIDE HCL 10 MG
1 TABLET ORAL
Qty: 0 | Refills: 0 | COMMUNITY

## 2018-10-02 ENCOUNTER — OUTPATIENT (OUTPATIENT)
Dept: OUTPATIENT SERVICES | Facility: HOSPITAL | Age: 73
LOS: 1 days | End: 2018-10-02
Payer: MEDICARE

## 2018-10-02 ENCOUNTER — APPOINTMENT (OUTPATIENT)
Dept: ULTRASOUND IMAGING | Facility: IMAGING CENTER | Age: 73
End: 2018-10-02

## 2018-10-02 DIAGNOSIS — G56.00 CARPAL TUNNEL SYNDROME, UNSPECIFIED UPPER LIMB: Chronic | ICD-10-CM

## 2018-10-02 DIAGNOSIS — R18.8 OTHER ASCITES: ICD-10-CM

## 2018-10-02 DIAGNOSIS — Z98.890 OTHER SPECIFIED POSTPROCEDURAL STATES: Chronic | ICD-10-CM

## 2018-10-02 DIAGNOSIS — Z95.5 PRESENCE OF CORONARY ANGIOPLASTY IMPLANT AND GRAFT: Chronic | ICD-10-CM

## 2018-10-02 PROCEDURE — P9047: CPT

## 2018-10-02 PROCEDURE — 49083 ABD PARACENTESIS W/IMAGING: CPT

## 2018-10-02 PROCEDURE — C1729: CPT

## 2018-10-03 LAB
COMMENT - FLUIDS: SIGNIFICANT CHANGE UP

## 2018-10-04 RX ORDER — PANTOPRAZOLE 40 MG/1
40 TABLET, DELAYED RELEASE ORAL DAILY
Qty: 30 | Refills: 3 | Status: ACTIVE | COMMUNITY
Start: 2018-10-04 | End: 1900-01-01

## 2018-10-05 ENCOUNTER — OUTPATIENT (OUTPATIENT)
Dept: OUTPATIENT SERVICES | Facility: HOSPITAL | Age: 73
LOS: 1 days | Discharge: HOME | End: 2018-10-05

## 2018-10-05 DIAGNOSIS — Z98.890 OTHER SPECIFIED POSTPROCEDURAL STATES: Chronic | ICD-10-CM

## 2018-10-05 DIAGNOSIS — C25.9 MALIGNANT NEOPLASM OF PANCREAS, UNSPECIFIED: ICD-10-CM

## 2018-10-05 DIAGNOSIS — Z95.5 PRESENCE OF CORONARY ANGIOPLASTY IMPLANT AND GRAFT: Chronic | ICD-10-CM

## 2018-10-05 DIAGNOSIS — G56.00 CARPAL TUNNEL SYNDROME, UNSPECIFIED UPPER LIMB: Chronic | ICD-10-CM

## 2018-10-08 DIAGNOSIS — R18.8 OTHER ASCITES: ICD-10-CM

## 2018-10-15 ENCOUNTER — APPOINTMENT (OUTPATIENT)
Dept: INFUSION THERAPY | Facility: HOSPITAL | Age: 73
End: 2018-10-15

## 2018-10-22 ENCOUNTER — APPOINTMENT (OUTPATIENT)
Dept: INFUSION THERAPY | Facility: HOSPITAL | Age: 73
End: 2018-10-22

## 2018-10-22 ENCOUNTER — APPOINTMENT (OUTPATIENT)
Dept: HEMATOLOGY ONCOLOGY | Facility: CLINIC | Age: 73
End: 2018-10-22

## 2018-10-24 PROCEDURE — 85014 HEMATOCRIT: CPT

## 2018-10-24 PROCEDURE — 85610 PROTHROMBIN TIME: CPT

## 2018-10-24 PROCEDURE — 88112 CYTOPATH CELL ENHANCE TECH: CPT

## 2018-10-24 PROCEDURE — 82565 ASSAY OF CREATININE: CPT

## 2018-10-24 PROCEDURE — 71045 X-RAY EXAM CHEST 1 VIEW: CPT

## 2018-10-24 PROCEDURE — 88342 IMHCHEM/IMCYTCHM 1ST ANTB: CPT

## 2018-10-24 PROCEDURE — C1729: CPT

## 2018-10-24 PROCEDURE — 88341 IMHCHEM/IMCYTCHM EA ADD ANTB: CPT

## 2018-10-24 PROCEDURE — 83880 ASSAY OF NATRIURETIC PEPTIDE: CPT

## 2018-10-24 PROCEDURE — 82945 GLUCOSE OTHER FLUID: CPT

## 2018-10-24 PROCEDURE — 83735 ASSAY OF MAGNESIUM: CPT

## 2018-10-24 PROCEDURE — 87015 SPECIMEN INFECT AGNT CONCNTJ: CPT

## 2018-10-24 PROCEDURE — 82330 ASSAY OF CALCIUM: CPT

## 2018-10-24 PROCEDURE — 84132 ASSAY OF SERUM POTASSIUM: CPT

## 2018-10-24 PROCEDURE — 82435 ASSAY OF BLOOD CHLORIDE: CPT

## 2018-10-24 PROCEDURE — 82042 OTHER SOURCE ALBUMIN QUAN EA: CPT

## 2018-10-24 PROCEDURE — 82803 BLOOD GASES ANY COMBINATION: CPT

## 2018-10-24 PROCEDURE — 81001 URINALYSIS AUTO W/SCOPE: CPT

## 2018-10-24 PROCEDURE — 76705 ECHO EXAM OF ABDOMEN: CPT

## 2018-10-24 PROCEDURE — 88305 TISSUE EXAM BY PATHOLOGIST: CPT

## 2018-10-24 PROCEDURE — 85027 COMPLETE CBC AUTOMATED: CPT

## 2018-10-24 PROCEDURE — 87040 BLOOD CULTURE FOR BACTERIA: CPT

## 2018-10-24 PROCEDURE — 93005 ELECTROCARDIOGRAM TRACING: CPT

## 2018-10-24 PROCEDURE — 87205 SMEAR GRAM STAIN: CPT

## 2018-10-24 PROCEDURE — 87070 CULTURE OTHR SPECIMN AEROBIC: CPT

## 2018-10-24 PROCEDURE — 87086 URINE CULTURE/COLONY COUNT: CPT

## 2018-10-24 PROCEDURE — 87116 MYCOBACTERIA CULTURE: CPT

## 2018-10-24 PROCEDURE — 82947 ASSAY GLUCOSE BLOOD QUANT: CPT

## 2018-10-24 PROCEDURE — 83615 LACTATE (LD) (LDH) ENZYME: CPT

## 2018-10-24 PROCEDURE — 83605 ASSAY OF LACTIC ACID: CPT

## 2018-10-24 PROCEDURE — 84157 ASSAY OF PROTEIN OTHER: CPT

## 2018-10-24 PROCEDURE — 80053 COMPREHEN METABOLIC PANEL: CPT

## 2018-10-24 PROCEDURE — 84100 ASSAY OF PHOSPHORUS: CPT

## 2018-10-24 PROCEDURE — 84484 ASSAY OF TROPONIN QUANT: CPT

## 2018-10-24 PROCEDURE — 89051 BODY FLUID CELL COUNT: CPT

## 2018-10-24 PROCEDURE — 49083 ABD PARACENTESIS W/IMAGING: CPT

## 2018-10-24 PROCEDURE — 87075 CULTR BACTERIA EXCEPT BLOOD: CPT

## 2018-10-24 PROCEDURE — 93306 TTE W/DOPPLER COMPLETE: CPT

## 2018-10-24 PROCEDURE — P9047: CPT

## 2018-10-24 PROCEDURE — 87206 SMEAR FLUORESCENT/ACID STAI: CPT

## 2018-10-24 PROCEDURE — 84295 ASSAY OF SERUM SODIUM: CPT

## 2018-10-24 PROCEDURE — 85730 THROMBOPLASTIN TIME PARTIAL: CPT

## 2018-10-24 PROCEDURE — 99285 EMERGENCY DEPT VISIT HI MDM: CPT

## 2018-11-05 ENCOUNTER — APPOINTMENT (OUTPATIENT)
Dept: INFUSION THERAPY | Facility: HOSPITAL | Age: 73
End: 2018-11-05

## 2018-11-10 LAB
CULTURE RESULTS: SIGNIFICANT CHANGE UP
SPECIMEN SOURCE: SIGNIFICANT CHANGE UP

## 2018-11-12 ENCOUNTER — APPOINTMENT (OUTPATIENT)
Dept: INFUSION THERAPY | Facility: HOSPITAL | Age: 73
End: 2018-11-12

## 2018-11-19 ENCOUNTER — APPOINTMENT (OUTPATIENT)
Dept: HEMATOLOGY ONCOLOGY | Facility: CLINIC | Age: 73
End: 2018-11-19

## 2018-12-13 LAB
ALBUMIN SERPL ELPH-MCNC: 3.2 G/DL
ALP BLD-CCNC: 235 U/L
ALT SERPL-CCNC: 29 U/L
ANION GAP SERPL CALC-SCNC: 14 MMOL/L
AST SERPL-CCNC: 36 U/L
BILIRUB SERPL-MCNC: 5 MG/DL
BUN SERPL-MCNC: 13 MG/DL
CALCIUM SERPL-MCNC: 8.9 MG/DL
CHLORIDE SERPL-SCNC: 102 MMOL/L
CO2 SERPL-SCNC: 24 MMOL/L
CREAT SERPL-MCNC: 1.01 MG/DL
GLUCOSE SERPL-MCNC: 170 MG/DL
HBV CORE IGG+IGM SER QL: NONREACTIVE
HBV CORE IGM SER QL: NONREACTIVE
HBV DNA # SERPL NAA+PROBE: NOT DETECTED IU/ML
HBV E AB SER QL: NEGATIVE
HBV E AG SER QL: NEGATIVE
HBV SURFACE AB SER QL: NONREACTIVE
HBV SURFACE AG SER QL: NONREACTIVE
HCV AB SER QL: NONREACTIVE
HCV GENTYP BLD NAA+PROBE: ABNORMAL
HCV S/CO RATIO: 0.17 S/CO
HEPB DNA PCR LOG: NOT DETECTED LOGIU/ML
POTASSIUM SERPL-SCNC: 5.4 MMOL/L
PROT SERPL-MCNC: 6 G/DL
SODIUM SERPL-SCNC: 140 MMOL/L

## 2018-12-17 ENCOUNTER — APPOINTMENT (OUTPATIENT)
Dept: HEMATOLOGY ONCOLOGY | Facility: CLINIC | Age: 73
End: 2018-12-17

## 2019-02-09 NOTE — PROGRESS NOTE ADULT - ASSESSMENT
Impression:  1. Biliary obstruction- 2/2 pancreatic mass with choledochoduodenostomy placed 8/7/18    Plan:  1. Recommend advancing to clears today  2. PPI qdaily  3. Trend liver enzymes  4. Continue with IVF  5. Ok to restart AC today 129

## 2019-06-03 NOTE — ED ADULT NURSE NOTE - CAS TRG GENERAL NORM CIRC DET
Replied to patient's [facility] message advising patient to clarify Adviar dosing before routing request to RENEA Woodruff NP.    Per Epic: patient was prescribed Advair 2 puffs BID but charting shows patient was taking 1 puff daily.  Will await reply from patient.   Strong peripheral pulses

## 2019-09-10 NOTE — H&P ADULT - PROBLEM SELECTOR PLAN 6
Caller checking status of message. Writer advised caller of callback within 24-72 hours.    Patient Name: Desiree Dunn  Callback Number: [phone]  Best Availability: today    Additional Info: Patient requesting a call back today with mammogram results.  Did you confirm the message with the caller?: yes    Thank you,  Joana Fitzpatrick     --anticoagulated with xarelto.

## 2020-04-02 PROBLEM — R18.8 ASCITES: Status: ACTIVE | Noted: 2018-08-28

## 2020-11-13 NOTE — ED PROVIDER NOTE - CADM POA CENTRAL LINE
Received call from pt regarding insurance questions and contact information.  All questions answered.  Pt has direct contact information for the SWer and she has agreed to contact as needed for support.      Vidhya King, [facility]  Oncology Social Work  Hematology Services  [phone]   No

## 2021-08-02 NOTE — PROVIDER CONTACT NOTE (OTHER) - ASSESSMENT
Pledget removed, right pupil well dilated. Pt tolerated well.   Pt AAOx4, resting peacefully in bed. Pt denies CP, SOB, or Palipitations. All other VSS (see flowsheet).

## 2022-03-17 NOTE — PATIENT PROFILE ADULT. - AS SC BRADEN ACTIVITY
Cristina , [facility] [address] [facility], [address] [place] [address]  Phone: [phone]  Fax: [phone]         Physical Therapy Certification    Dear Referring Practitioner: Bahman Novak MD,    We had the pleasure of evaluating the following patient for physical therapy services at [address] [place] [address]. A summary of our findings can be found in the initial assessment below. This includes our plan of care. If you have any questions or concerns regarding these findings, please do not hesitate to contact me at the office phone number checked above. Thank you for the referral.       Physician Signature:_______________________________Date:__________________  By signing above (or electronic signature), therapists plan is approved by physician      Patient: Merlyn Erazo   : 1963   MRN: [id number]  Referring Physician: Referring Practitioner: Bahman Novak MD      Evaluation Date: 3/17/2022      Medical Diagnosis Information:  Diagnosis: M54.2 Cx Pain   Treatment Diagnosis: cx strain  with R UE radiculopathy d/t overuse and repetitive motions                                         Insurance information: PT Insurance Information: BCBS    Precautions/ Contra-indications:     C-SSRS Triggered by Intake questionnaire (Past 2 wk assessment ):   [x] No, Questionnaire did not trigger screening.   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 She c/o constant R sided cx pain, R shldr and scapular pain, and NT in R hand.  Patient was referred to PT by her PCP. Fear avoidance: I should not do physical activities that (might) make my pain worse   [] True   [x] False     Relevant Medical History:   Functional Outcome: NDI: raw score = 11; dysfunction = 22%    Pain Scale: 6/10  Easing factors: rest, taking a shower  Provocative factors: work duties, sleep positions    Type: [x]Constant   []Intermittent  []Radiating []Localized []other:     Numbness/Tingling: R hand    Occupation/School: run a dessert business    Living Status/Prior Level of Function: Prior to this injury / incident, pt was independent with ADLs and IADLs,     OBJECTIVE:   Palpation: R UT and LS mm guarding    Functional Mobility/Transfers:     Posture: tight pects    Bandages/Dressings/Incisions:    Gait: (include devices/WB status):  WNL     Dermatomes Normal Abnormal Comments   Top of head (C1) x     Posterior occipital region (C2) x     Side of neck (C3) x     Top of shoulder (C4) x     Lateral deltoid (C5) x     Tip of thumb (C6) x     Distal middle finger (C7) x     Distal fifth finger (C8) x     Medial forearm (T1) x     Lower extremity x         Reflexes Normal Abnormal Comments   C5-6 Biceps   NT   C5-6 Brachioradialis      C7-8 Triceps      Bazzis      S1-2 Seated achilles      S1-2 Prone knee bend      L3-4 Patellar tendon      Clonus      Babinski          CERV ROM     Cervical Flexion 50    Cervical Extension 35    Cervical SB 28 20   Cervical rotation 70 75        ROM Left Right   Shoulder Flex 160 160   Shoulder Abd full Full w pn   Shoulder ER 80 75   Shoulder IR 80 80             Strength / Myotomes Left Right   Cervical Flexion (C1-2)     Cervical Side-bending (C3)     Shoulder Shrug (C4)     Shoulder Flex 5 5-   Shoulder Scap     Shoulder Abduction (C5) 5 4+   Shoulder ER 5 4+   Shoulder IR 5 5   Biceps (C6) 5 5   Triceps (C7)     Wrist Extension (C6)     Wrist Flexion (C7)           Thumb Abduction (C8)     Finger Abduction (T1)       Lower extremity myotomes: [x]Normal     []Abnormal     Joint mobility: cx spine   []Normal    []Hypo   []Hyper    Orthopaedic Special Tests  Positive  Negative  NT Comments    Hautard's        Rhomberg       Sharps-Lizbeth       Cervical Torsion / Body Rotation        C2 Kick       Modified Shear       Compression       Distraction     With releif                                [x] Patient history, allergies, meds reviewed. Medical chart reviewed. See intake form. Review Of Systems (ROS):  [x]Performed Review of systems (Integumentary, CardioPulmonary, Neurological) by intake and observation. Intake form has been scanned into medical record. Patient has been instructed to contact their primary care physician regarding ROS issues if not already being addressed at this time.       Co-morbidities/Complexities (which will affect course of rehabilitation):  [x]None           Arthritic conditions   []Rheumatoid arthritis (M05.9)  []Osteoarthritis (M19.91)   Cardiovascular conditions   []Hypertension (I10)  []Hyperlipidemia (E78.5)  []Angina pectoris (I20)  []Atherosclerosis (I70)   Musculoskeletal conditions   []Disc pathology   []Congenital spine pathologies   []Prior surgical intervention  []Osteoporosis (M81.8)  []Osteopenia (M85.8)   Endocrine conditions   []Hypothyroid (E03.9)  []Hyperthyroid Gastrointestinal conditions   []Constipation (V01.56)   Metabolic conditions   []Morbid obesity (E66.01)  []Diabetes type 1(E10.65) or 2 (E11.65)   []Neuropathy (G60.9)     Pulmonary conditions   []Asthma (J45)  []Coughing   []COPD (J44.9)   Psychological Disorders  []Anxiety (F41.9)  []Depression (F32.9)   []Other:   []Other:          Barriers to/and or personal factors that will affect rehab potential:              []Age  []Sex   []Smoker              []Motivation/Lack of Motivation                        []Co-Morbidities              []Cognitive Function, education/learning barriers              []Environmental, home barriers [x]profession/work barriers  []past PT/medical experience  []other:  Justification:     Falls Risk Assessment (30 days):   [x] Falls Risk assessed and no intervention required. [] Falls Risk assessed and Patient requires intervention due to being higher risk   TUG score (>12s at risk):     [] Falls education provided, including         ASSESSMENT:   Functional Impairments:     [x]Noted cervical/thoracic/GHJ joint hypomobility   []Noted cervical/thoracic/GHJ joint hypermobility   [x]Decreased cervical/UE functional ROM   []Noted Headache pain aggravated by neck movements with/without dizziness   []Abnormal reflexes/sensation/myotomal/dermatomal deficits   []Decreased DCF control or ability to hold head up   [x]Decreased RC/scapular/core strength and neuromuscular control    []Decreased UE functional strength   []other:      Functional Activity Limitations (from functional questionnaire and intake)   [x]Reduced ability to tolerate prolonged functional positions   [x]Reduced ability or difficulty with changes of positions or transfers between positions   [x]Reduced ability to maintain good posture and demonstrate good body mechanics with sitting, bending, and lifting   [] Reduced ability or tolerance with driving and/or computer work   []Reduced ability to perform lifting, reaching, carrying tasks   []Reduced ability to concentrate   [x]Reduced ability to sleep    [x]Reduced ability to tolerate any impact through UE or spine   []Reduced ability to ambulate prolonged functional periods/distances   []other:    Participation Restrictions   []Reduced participation in self care activities   [x]Reduced participation in home management activities   [x]Reduced participation in work activities   []Reduced participation in social activities. []Reduced participation in sport/recreational activities.     Classification/Subgrouping:   [x]signs/symptoms consistent with neck pain with mobility deficits     []signs/symptoms characteristics   [] evolving clinical presentation with changing characteristics  [] unstable and unpredictable characteristics;   [x] Clinical decision making of [x] low, [] moderate, [] high complexity using standardized patient assessment instrument and/or measurable assessment of functional outcome. [x] EVAL (LOW) 36257 (typically 20 minutes face-to-face)  [] EVAL (MOD) 48992 (typically 30 minutes face-to-face)  [] EVAL (HIGH) 13655 (typically 45 minutes face-to-face)  [] RE-EVAL     PLAN:   Frequency/Duration: 2 days per week for  6 Weeks:  Interventions:  [x]  Therapeutic exercise including: strength training, ROM, for cervical spine,scapula, core and Upper extremity, including postural re-education. [x]  NMR activation and proprioception for Deep cervical flexors, periscapular and RC muscles and Core, including postural re-education. [x]  Manual therapy as indicated for C/T spine, ribs, Soft tissue to include: Dry Needling/IASTM, STM, PROM, Gr I-IV mobilizations, manipulation. [x] Modalities as needed that may include: thermal agents, E-stim, Biofeedback, US, iontophoresis as indicated  [x] Patient education on joint protection, postural re-education, activity modification, progression of HEP. HEP instruction: Written HEP instructions provided and reviewed     GOALS:  Patient stated goal: relief of R cx, scap and shldr pain  [] Progressing: [] Met: [] Not Met: [] Adjusted    Therapist goals for Patient:   Short Term Goals: To be achieved in: 2 weeks  1. Independent in HEP and progression per patient tolerance, in order to prevent re-injury. [] Progressing: [] Met: [] Not Met: [] Adjusted  2. Patient will have a decrease in pain to facilitate improvement in movement, function, and ADLs as indicated by Functional Deficits. [] Progressing: [] Met: [] Not Met: [] Adjusted    Long Term Goals: To be achieved in: 6 weeks  1.  Disability index score of 10% or less for the NDI to assist with reaching prior level of function. [] Progressing: [] Met: [] Not Met: [] Adjusted  2. Patient will demonstrate increased AROM to [facility] of cervical/thoracic spine to allow for proper joint functioning as indicated by patients Functional Deficits. [] Progressing: [] Met: [] Not Met: [] Adjusted  3. Patient will demonstrate an increase in postural awareness and control and activation of  Deep cervical stabilizers to allow for proper functional mobility as indicated by patients Functional Deficits. [] Progressing: [] Met: [] Not Met: [] Adjusted  4. Patient will return to functional activities including sleep, mixing while baking, lifting without increased symptoms or restriction. [] Progressing: [] Met: [] Not Met: [] Adjusted  5.  Patient will report 60% improvement in pain and function  [] Progressing: [] Met: [] Not Met: [] Adjusted     Electronically signed by:  Nadeen Torres, PTMPT [id number] (3) walks occasionally

## 2023-01-06 NOTE — ED ADULT NURSE NOTE - CHIEF COMPLAINT
Thank you for coming to our Emergency Department today. It is important to remember that some problems are difficult to diagnose and may not be found during your first visit. Be sure to follow up with your primary care doctor.  If you do not have one, you may contact the one listed on your discharge paperwork or you may also call the [facility] Appointment Desk at [phone] to schedule an appointment with one.     Return to the ER with any questions/concerns, new/concerning symptoms, worsening or failure to improve. Do not drive or make any important decisions for 24 hours if you have received any pain medications, sedatives or mood altering drugs during your ER visit.   The patient is a 73y Male complaining of

## 2023-04-20 NOTE — ED PROVIDER NOTE - RATE
[place]    EMERGENCY DEPARTMENT ENCOUNTER      Pt Name: Rory Rios Jr.  MRN: [id number]  YOB: 1958  Date of evaluation: 4/20/2023  Provider: Esvin Palomo DO    CHIEF COMPLAINT       Chief Complaint   Patient presents with   • Shortness of Breath     PATIENT PRESENTS TO THE ER COMPLAINING OF SOB, HAS HX OF BLACK LUNG DEALING WITH SINCE DECEMBER. HAD THORACENTESIS ON APRIL 5TH, HAD 2L TAKEN OFF. SENT HERE FOR ADMISSION. PATIENT WEARS 2L NC ALL THE TIME    HISTORY OF PRESENT ILLNESS  (Location/Symptom, Timing/Onset, Context/Setting, Quality, Duration, Modifying Factors, Severity.)   Rory Rios Jr. is a 65 y.o. male who presents to the emergency department for evaluation of shortness of breath, pleural effusion, history of black lung disease, chronic shortness of breath, respiratory failure.  He is on 2 to 3 L nasal cannula chronically.  Early in April he had a thoracentesis with 1.5 L removed with Dr. Stoddard at [facility].  Is scheduling for a evaluation with Dr. Solis with cardiothoracic for possible VATS procedure.  Has had increased fluid buildup in the left lung fields instructed to come to the hospital for further assessment, possible thoracentesis.  Denies any fever, chills, he does note generalized weakness, increased dyspnea and shortness of breath over the last couple days.  He does not take any blood thinning medications.  He has no other acute systemic complaints at this time.        Nursing notes were reviewed.      PAST MEDICAL HISTORY     Past Medical History:   Diagnosis Date   • Arthritis    • Black lung    • Black lung disease    • Diabetes mellitus    • GERD (gastroesophageal reflux disease)    • Hypertension    • Pneumonia          SURGICAL HISTORY       Past Surgical History:   Procedure Laterality Date   • FOOT SURGERY Left          CURRENT MEDICATIONS       Current Facility-Administered Medications:   •  acetaminophen (TYLENOL) tablet 650 mg, 650 mg, Oral,  Q4H PRN **OR** acetaminophen (TYLENOL) 160 MG/5ML solution 650 mg, 650 mg, Oral, Q4H PRN **OR** acetaminophen (TYLENOL) suppository 650 mg, 650 mg, Rectal, Q4H PRN, Agnieszka Lemus, APRN  •  sennosides-docusate (PERICOLACE) 8.6-50 MG per tablet 2 tablet, 2 tablet, Oral, BID PRN **AND** polyethylene glycol (MIRALAX) packet 17 g, 17 g, Oral, Daily PRN **AND** bisacodyl (DULCOLAX) EC tablet 5 mg, 5 mg, Oral, Daily PRN **AND** bisacodyl (DULCOLAX) suppository 10 mg, 10 mg, Rectal, Daily PRN, Agnieszka Lemus, APRN  •  busPIRone (BUSPAR) tablet 5 mg, 5 mg, Oral, Nightly, Alexandria Ponce MD, 5 mg at 04/20/23 2022  •  Calcium Replacement - Follow Nurse / BPA Driven Protocol, , Does not apply, PRN, Agnieszka Lemus, APRN  •  dextrose (D50W) (25 g/50 mL) IV injection 25 g, 25 g, Intravenous, Q15 Min PRN, Agnieszka eLmus, APRN  •  dextrose (GLUTOSE) oral gel 15 g, 15 g, Oral, Q15 Min PRN, Agnieszka Lemus, APRN  •  glucagon (GLUCAGEN) injection 1 mg, 1 mg, Intramuscular, Q15 Min PRN, Agnieszka Lemus, APRN  •  HYDROcodone-acetaminophen (NORCO) 7.5-325 MG per tablet 1 tablet, 1 tablet, Oral, Q8H PRN, Agnieszka Lemus, APRN  •  hydrOXYzine (ATARAX) tablet 25 mg, 25 mg, Oral, TID PRN, Alexandria Ponce MD  •  Insulin Lispro (humaLOG) injection 0-9 Units, 0-9 Units, Subcutaneous, TID AC, Agnieszka Lemus, APRN  •  ipratropium-albuterol (DUO-NEB) nebulizer solution 3 mL, 3 mL, Nebulization, Q6H PRN, Agnieszka Lemus, APRN, 3 mL at 04/20/23 2126  •  ipratropium-albuterol (DUO-NEB) nebulizer solution 3 mL, 3 mL, Nebulization, 4x Daily - RT, Alexandria Ponce MD  •  ketorolac (TORADOL) injection 15 mg, 15 mg, Intravenous, Q6H PRN, Agnieszka Lemus, APRN  •  lisinopril (PRINIVIL,ZESTRIL) tablet 10 mg, 10 mg, Oral, Nightly, Agnieszka Lemus, APRN, 10 mg at 04/20/23 2022  •  Magnesium Standard Dose Replacement - Follow Nurse / BPA Driven Protocol, , Does not apply, PRN, Allan,  DILMA Lopez  •  metoprolol succinate XL (TOPROL-XL) 24 hr tablet 50 mg, 50 mg, Oral, Daily, Alexandria Ponce MD, 50 mg at 04/20/23 1939  •  ondansetron (ZOFRAN) tablet 4 mg, 4 mg, Oral, Q6H PRN **OR** ondansetron (ZOFRAN) injection 4 mg, 4 mg, Intravenous, Q6H PRN, Agnieszka Lemus, DILMA  •  pantoprazole (PROTONIX) EC tablet 40 mg, 40 mg, Oral, BID AC, Agnieszka Lemus APRN, 40 mg at 04/20/23 1831  •  Phosphorus Replacement - Follow Nurse / BPA Driven Protocol, , Does not apply, PRN, Agnieszka Lemus APRN  •  Potassium Replacement - Follow Nurse / BPA Driven Protocol, , Does not apply, PRN, Agnieszka Lemus, APRN  •  sodium chloride 0.9 % flush 10 mL, 10 mL, Intravenous, PRN, Emergency, Triage Protocol, MD  •  sodium chloride 0.9 % flush 10 mL, 10 mL, Intravenous, Q12H, Agnieszka Lemus APRN, 10 mL at 04/20/23 2022  •  sodium chloride 0.9 % flush 10 mL, 10 mL, Intravenous, PRN, Agnieszka Lemus, APRN  •  sodium chloride 0.9 % infusion 40 mL, 40 mL, Intravenous, PRN, Agnieszka Lemus, APRN    ALLERGIES     Patient has no known allergies.    FAMILY HISTORY       Family History   Problem Relation Age of Onset   • Diabetes Mother    • Heart failure Father    • Diabetes Sister    • Heart disease Brother    • Diabetes Brother           SOCIAL HISTORY       Social History     Socioeconomic History   • Marital status:    Tobacco Use   • Smoking status: Never     Passive exposure: Never   • Smokeless tobacco: Current     Types: Chew   Vaping Use   • Vaping Use: Never used   Substance and Sexual Activity   • Alcohol use: No   • Drug use: No   • Sexual activity: Defer         PHYSICAL EXAM    (up to 7 for level 4, 8 or more for level 5)     Vitals:    04/20/23 1939 04/20/23 2022 04/20/23 2127 04/20/23 2152   BP: (!) 144/107 (!) 144/107     BP Location: Left arm      Patient Position: Lying      Pulse: (!) 144 94 89 81   Resp: 20  20    Temp: 97.8 °F (36.6 °C)      TempSrc: Oral       SpO2: 95%  95% 96%   Weight:       Height:           Physical Exam  General : Patient is awake, alert, oriented, in no acute distress, nontoxic appearing  HEENT: Pupils are equally round, EOMI, conjunctivae clear, there is no injection no icterus.  Oral mucosa is moist, uvula midline  Neck: Neck is supple, full range of motion, trachea midline  Cardiac: Heart regular rate, rhythm, no murmurs, rubs, or gallops  Lungs: Lungs with decreased breath sound bilaterally, left greater than right, on 3 L nasal cannula  Chest wall: There is no tenderness to palpation over the chest wall or over ribs  Abdomen: Abdomen is soft, nontender, nondistended. There are no firm or pulsatile masses, no rebound rigidity or guarding  Musculoskeletal: 5 out of 5 strength in all 4 extremities.  No focal muscle deficits are appreciated  Neuro: Motor intact, sensory intact, level of consciousness is normal  Dermatology: Skin is warm and dry  Psych: Mentation is grossly normal, cognition is grossly normal. Affect is appropriate      DIAGNOSTIC RESULTS     EKG:  All EKGs are interpreted by the Emergency Department Physician who either signs or Co-signs this chart in the absence of a cardiologist.    ECG 12 Lead Rhythm Change   Preliminary Result   Test Reason : Rhythm Change   Blood Pressure :   */*   mmHG   Vent. Rate : 145 BPM     Atrial Rate : 153 BPM      P-R Int :   * ms          QRS Dur :  80 ms       QT Int : 314 ms       P-R-T Axes :   *  53  44 degrees      QTc Int : 487 ms      Supraventricular tachycardia   Otherwise normal ECG   When compared with ECG of 20-APR-2023 13:41,   premature atrial complexes are no longer present   Vent. rate has increased BY  66 BPM   Criteria for Septal infarct are no longer present      Referred By: E CODRAY           Confirmed By:       ECG 12 Lead ED Triage Standing Order; SOA   Final Result   Test Reason : ED Triage Standing Order~   Blood Pressure :   */*   mmHG   Vent. Rate :  79 BPM     Atrial  Rate :  79 BPM      P-R Int : 170 ms          QRS Dur :  78 ms       QT Int : 382 ms       P-R-T Axes :  47  39  44 degrees      QTc Int : 438 ms      Sinus rhythm with premature atrial complexes   Low voltage QRS   Septal infarct (cited on or before 09-FEB-2023)   Abnormal ECG   When compared with ECG of 09-FEB-2023 11:24,   premature atrial complexes are now present   Questionable change in initial forces of Anterior leads   Confirmed by ROB BRANCH MD ([id number]) on 4/20/2023 2:11:37 PM      Referred By: EDMD           Confirmed By: ROB BRANCH MD          RADIOLOGY:     [] Radiologist's Report Reviewed:  XR Chest 1 View   Final Result   Impression:      1. No change in the masslike nodular appearing infiltrate in the right perihilar region.   2. Improvement in the left perihilar infiltrate compared with the last study with improvement in aeration at the left base.   3. Moderate pleural effusion, unchanged.         Electronically Signed: Miguelito GONG Braeden     4/20/2023 2:22 PM EDT     Workstation ID: [id number]          I ordered and independently reviewed the above noted radiographic studies.      I viewed images of chest x-ray which showed left-sided recurrent pleural effusion per my independent interpretation.    See radiologist's dictation for official interpretation.      ED BEDSIDE ULTRASOUND:   Performed by ED Physician - none    LABS:    I have reviewed and interpreted all of the currently available lab results from this visit (if applicable):  Results for orders placed or performed during the hospital encounter of 04/20/23   Comprehensive Metabolic Panel    Specimen: Blood   Result Value Ref Range    Glucose 96 65 - 99 mg/dL    BUN 11 8 - 23 mg/dL    Creatinine 0.93 0.76 - 1.27 mg/dL    Sodium 142 136 - 145 mmol/L    Potassium 3.8 3.5 - 5.2 mmol/L    Chloride 104 98 - 107 mmol/L    CO2 28.0 22.0 - 29.0 mmol/L    Calcium 9.8 8.6 - 10.5 mg/dL    Total Protein 6.8 6.0 - 8.5 g/dL    Albumin 3.9 3.5 - 5.2 g/dL     ALT (SGPT) 20 1 - 41 U/L    AST (SGOT) 20 1 - 40 U/L    Alkaline Phosphatase 85 39 - 117 U/L    Total Bilirubin 0.3 0.0 - 1.2 mg/dL    Globulin 2.9 gm/dL    A/G Ratio 1.3 g/dL    BUN/Creatinine Ratio 11.8 7.0 - 25.0    Anion Gap 10.0 5.0 - 15.0 mmol/L    eGFR 91.1 >60.0 mL/min/1.73   BNP    Specimen: Blood   Result Value Ref Range    proBNP 117.7 0.0 - 900.0 pg/mL   Single High Sensitivity Troponin T    Specimen: Blood   Result Value Ref Range    HS Troponin T 21 (H) <15 ng/L   CBC Auto Differential    Specimen: Blood   Result Value Ref Range    WBC 8.65 3.40 - 10.80 10*3/mm3    RBC 5.51 4.14 - 5.80 10*6/mm3    Hemoglobin 13.7 13.0 - 17.7 g/dL    Hematocrit 45.8 37.5 - 51.0 %    MCV 83.1 79.0 - 97.0 fL    MCH 24.9 (L) 26.6 - 33.0 pg    MCHC 29.9 (L) 31.5 - 35.7 g/dL    RDW 14.6 12.3 - 15.4 %    RDW-SD 44.0 37.0 - 54.0 fl    MPV 9.3 6.0 - 12.0 fL    Platelets 248 140 - 450 10*3/mm3    Neutrophil % 65.9 42.7 - 76.0 %    Lymphocyte % 19.9 19.6 - 45.3 %    Monocyte % 8.6 5.0 - 12.0 %    Eosinophil % 4.7 0.3 - 6.2 %    Basophil % 0.7 0.0 - 1.5 %    Immature Grans % 0.2 0.0 - 0.5 %    Neutrophils, Absolute 5.70 1.70 - 7.00 10*3/mm3    Lymphocytes, Absolute 1.72 0.70 - 3.10 10*3/mm3    Monocytes, Absolute 0.74 0.10 - 0.90 10*3/mm3    Eosinophils, Absolute 0.41 (H) 0.00 - 0.40 10*3/mm3    Basophils, Absolute 0.06 0.00 - 0.20 10*3/mm3    Immature Grans, Absolute 0.02 0.00 - 0.05 10*3/mm3    nRBC 0.0 0.0 - 0.2 /100 WBC   C-reactive Protein    Specimen: Blood   Result Value Ref Range    C-Reactive Protein 1.15 (H) 0.00 - 0.50 mg/dL   Sedimentation Rate    Specimen: Blood   Result Value Ref Range    Sed Rate 67 (H) 0 - 20 mm/hr   POC Glucose Once    Specimen: Blood   Result Value Ref Range    Glucose 86 70 - 130 mg/dL   ECG 12 Lead ED Triage Standing Order; SOA   Result Value Ref Range    QT Interval 382 ms    QTC Interval 438 ms   ECG 12 Lead Rhythm Change   Result Value Ref Range    QT Interval 314 ms    QTC Interval 487 ms    Green Top (Gel)   Result Value Ref Range    Extra Tube Hold for add-ons.    Lavender Top   Result Value Ref Range    Extra Tube hold for add-on    Gold Top - SST   Result Value Ref Range    Extra Tube Hold for add-ons.    Gray Top   Result Value Ref Range    Extra Tube Hold for add-ons.    Light Blue Top   Result Value Ref Range    Extra Tube Hold for add-ons.         If labs were ordered, I independently reviewed the results and considered them in treating the patient.      EMERGENCY DEPARTMENT COURSE and DIFFERENTIAL DIAGNOSIS/MDM:   Vitals:  AS OF 22:27 EDT    BP - (!) 144/107  HR - 81  TEMP - 97.8 °F (36.6 °C) (Oral)  O2 SATS - 96%      Orders placed during this visit:  Orders Placed This Encounter   Procedures   • XR Chest 1 View   • [place] Draw   • Comprehensive Metabolic Panel   • BNP   • Single High Sensitivity Troponin T   • CBC Auto Differential   • C-reactive Protein   • Sedimentation Rate   • Basic Metabolic Panel   • CBC (No Diff)   • NPO Diet NPO Type: Strict NPO   • Diet: Cardiac Diets, Diabetic Diets; Healthy Heart (2-3 Na+); Consistent Carbohydrate; Texture: Regular Texture (IDDSI 7); Fluid Consistency: Thin (IDDSI 0)   • Undress & Gown   • Vital Signs   • Vital Signs   • Intake & Output   • Weigh Patient   • Oral Care   • Saline Lock & Maintain IV Access   • Place Sequential Compression Device   • Maintain Sequential Compression Device   • Cardiac Monitoring   • Pulse Oximetry, Continuous   • Up With Assistance   • Do NOT Hold Basal or Correction Scale Insulin When Patient is NPO, Hold Scheduled Mealtime (Bolus) Insulin if NPO   • Code Status and Medical Interventions:   • Inpatient Cardiothoracic Surgery Consult   • DIET MESSAGE Please send dinner tray did not get one.   • Oxygen Therapy- Nasal Cannula; Titrate for SPO2: 90% - 95%   • NIPPV (CPAP or BIPAP)   • POC Glucose TID AC   • POC Glucose Once   • ECG 12 Lead ED Triage Standing Order; SOA   • ECG 12 Lead Rhythm Change   • Insert  Peripheral IV   • Insert Peripheral IV   • Initiate Observation Status   • CBC & Differential   • Green Top (Gel)   • Lavender Top   • Gold Top - SST   • Gray Top   • Light Blue Top       All labs have been independently reviewed by me.  All radiology studies have been reviewed by me and the radiologist dictating the report.  All EKG's have been independently viewed and interpreted by me.      Discussion below represents my analysis of pertinent findings related to patient's condition, differential diagnosis, treatment plan and final disposition.    Differential diagnosis:  The differential diagnosis associated with the patient's presentation includes: Pleural effusion, peripheral edema, heart failure, emphysema black lung    Additional sources  Discussed/ obtained information from independent historians:   [] Spouse  [] Parent  [x] Family member  [] Friend  [] EMS   [] Other:    External (non-ED) record review:   [x] Inpatient record: [facility]   [] Office record:   [] Outpatient record:   [x] Prior Outpatient labs:   [x] Prior Outpatient radiology:   [] Primary Care record:   [] Outside ED record:   [] Other:     Patient's care impacted by:   [] Diabetes  [] Hypertension  [] Hyperlipidemia  [] Coronary Artery Disease   [] COPD   [] Cancer   [] Tobacco Abuse   [] Substance Abuse    [] Other:     Care significantly affected by Social Determinants of Health (housing and economic circumstances, unemployment)    [] Yes     [x] No   If yes, Patient's care significantly limited by Social Determinants of Health including:   [] Inadequate housing   [] Low income   [] Alcoholism and drug addiction in family   [] Problems related to primary support group   [] Unemployment   [] Problems related to employment   [] Other Social Determinants of Health:       MEDICATIONS ADMINISTERED IN ED:  Medications   sodium chloride 0.9 % flush 10 mL (has no administration in time range)   pantoprazole (PROTONIX) EC tablet 40 mg (40  mg Oral Given 4/20/23 1831)   HYDROcodone-acetaminophen (NORCO) 7.5-325 MG per tablet 1 tablet (has no administration in time range)   lisinopril (PRINIVIL,ZESTRIL) tablet 10 mg (10 mg Oral Given 4/20/23 2022)   sodium chloride 0.9 % flush 10 mL (10 mL Intravenous Given 4/20/23 2022)   sodium chloride 0.9 % flush 10 mL (has no administration in time range)   sodium chloride 0.9 % infusion 40 mL (has no administration in time range)   acetaminophen (TYLENOL) tablet 650 mg (has no administration in time range)     Or   acetaminophen (TYLENOL) 160 MG/5ML solution 650 mg (has no administration in time range)     Or   acetaminophen (TYLENOL) suppository 650 mg (has no administration in time range)   ketorolac (TORADOL) injection 15 mg (has no administration in time range)   Potassium Replacement - Follow Nurse / BPA Driven Protocol (has no administration in time range)   Magnesium Standard Dose Replacement - Follow Nurse / BPA Driven Protocol (has no administration in time range)   Phosphorus Replacement - Follow Nurse / BPA Driven Protocol (has no administration in time range)   Calcium Replacement - Follow Nurse / BPA Driven Protocol (has no administration in time range)   sennosides-docusate (PERICOLACE) 8.6-50 MG per tablet 2 tablet (has no administration in time range)     And   polyethylene glycol (MIRALAX) packet 17 g (has no administration in time range)     And   bisacodyl (DULCOLAX) EC tablet 5 mg (has no administration in time range)     And   bisacodyl (DULCOLAX) suppository 10 mg (has no administration in time range)   ondansetron (ZOFRAN) tablet 4 mg (has no administration in time range)     Or   ondansetron (ZOFRAN) injection 4 mg (has no administration in time range)   ipratropium-albuterol (DUO-NEB) nebulizer solution 3 mL (3 mL Nebulization Given 4/20/23 2126)   dextrose (GLUTOSE) oral gel 15 g (has no administration in time range)   dextrose (D50W) (25 g/50 mL) IV injection 25 g (has no administration  in time range)   glucagon (GLUCAGEN) injection 1 mg (has no administration in time range)   Insulin Lispro (humaLOG) injection 0-9 Units (0 Units Subcutaneous Not Given 4/20/23 1820)   metoprolol succinate XL (TOPROL-XL) 24 hr tablet 50 mg (50 mg Oral Given 4/20/23 1939)   busPIRone (BUSPAR) tablet 5 mg (5 mg Oral Given 4/20/23 2022)   hydrOXYzine (ATARAX) tablet 25 mg (has no administration in time range)   ipratropium-albuterol (DUO-NEB) nebulizer solution 3 mL (3 mL Nebulization Not Given 4/20/23 2154)              Pleasant 65-year-old male with an unfortunate pulmonary history with black lung disease, recurrent pleural effusions requiring thoracentesis.  Presents today with increased fluid on the left side an outpatient chest x-ray.  Is planning to see our cardiothoracic team for VATS procedure at some point.  Given a history of recent thoracentesis with reaccumulation he may need another, we did obtain IV, labs, image including chest x-ray.  Results as above.  Images reveals a left-sided moderate pleural effusion, chronic changes and scarring to the right lung.  Patient's blood work is stable, no signs of acute infection, sepsis.  Given the recurrent effusions, he may need repeat thoracentesis, CT surgery consultation for possible VATS procedure at some point.  I did discuss the case with hospitalist team, Dr. Rivero, for admission.    PROCEDURES:  Procedures    CRITICAL CARE TIME    Total Critical Care time was 0 minutes, excluding separately reportable procedures.   There was a high probability of clinically significant/life threatening deterioration in the patient's condition which required my urgent intervention.      FINAL IMPRESSION      1. Recurrent left pleural effusion    2. Dyspnea and respiratory abnormalities    3. Black lung    4. Chronic respiratory failure with hypoxia          DISPOSITION/PLAN     ED Disposition     ED Disposition   Decision to Admit    Condition   --    Comment   Level of Care:  Telemetry [5]   Diagnosis: Recurrent left pleural effusion [[id number]]   Admitting Physician: BYRON BERMUDEZ [[id number]]   Attending Physician: BYRON BERMUDEZ [[id number]]                 Comment: Please note this report has been produced using speech recognition software.      Esvin Palomo DO  Attending Emergency Physician       Esvin Palomo DO  04/20/23 0394     128

## 2023-06-05 NOTE — PATIENT PROFILE ADULT. - FUNCTIONAL SCREEN CURRENT LEVEL: DRESSING, MLM
(0) independent [Chest Pain] : chest pain [Palpitations] : palpitations [Rapid Heart Rate] : rapid heart rate [Normal] : Psychiatric [History of Murmur] : no history of murmur [Fainting] : no fainting [Irritability] : no irritability [[email or web address]] : Last Cardiology visit-- Feb 2023

## 2024-07-22 NOTE — PATIENT PROFILE ADULT. - NS PRO PT REFERRAL QUES 2 YN
What Type Of Note Output Would You Prefer (Optional)?: Bullet Format How Severe Is Your Rash?: mild Is This A New Presentation, Or A Follow-Up?: Rash Additional History: Patient states he was taking supplements and stopped most 20 days ago. And he Can’t recall the cause of the rash. He works in construction, and believes that the heat might be the causes. Patient had analysis and WNL. no